# Patient Record
Sex: FEMALE | Race: BLACK OR AFRICAN AMERICAN | Employment: FULL TIME | URBAN - METROPOLITAN AREA
[De-identification: names, ages, dates, MRNs, and addresses within clinical notes are randomized per-mention and may not be internally consistent; named-entity substitution may affect disease eponyms.]

---

## 2017-10-18 ENCOUNTER — HOSPITAL ENCOUNTER (EMERGENCY)
Age: 47
Discharge: HOME | End: 2017-10-19
Attending: EMERGENCY MEDICINE
Payer: SELF-PAY

## 2017-10-18 VITALS
RESPIRATION RATE: 18 BRPM | SYSTOLIC BLOOD PRESSURE: 150 MMHG | OXYGEN SATURATION: 100 % | TEMPERATURE: 97.8 F | DIASTOLIC BLOOD PRESSURE: 93 MMHG | HEART RATE: 78 BPM

## 2017-10-18 DIAGNOSIS — R73.9 HYPERGLYCEMIA: Primary | ICD-10-CM

## 2017-10-18 LAB
ALBUMIN SERPL-MCNC: 3.5 G/DL (ref 3.4–5)
ALBUMIN/GLOB SERPL: 0.8 {RATIO} (ref 0.8–1.7)
ALP SERPL-CCNC: 139 U/L (ref 45–117)
ALT SERPL-CCNC: 23 U/L (ref 13–56)
ANION GAP SERPL CALC-SCNC: 7 MMOL/L (ref 3–18)
AST SERPL-CCNC: 34 U/L (ref 15–37)
BASOPHILS # BLD: 0 K/UL (ref 0–0.1)
BASOPHILS NFR BLD: 0 % (ref 0–2)
BILIRUB SERPL-MCNC: 0.6 MG/DL (ref 0.2–1)
BUN SERPL-MCNC: 13 MG/DL (ref 7–18)
BUN/CREAT SERPL: 13 (ref 12–20)
CALCIUM SERPL-MCNC: 8.9 MG/DL (ref 8.5–10.1)
CHLORIDE SERPL-SCNC: 100 MMOL/L (ref 100–108)
CO2 SERPL-SCNC: 26 MMOL/L (ref 21–32)
CREAT SERPL-MCNC: 0.98 MG/DL (ref 0.6–1.3)
DIFFERENTIAL METHOD BLD: NORMAL
EOSINOPHIL # BLD: 0.1 K/UL (ref 0–0.4)
EOSINOPHIL NFR BLD: 2 % (ref 0–5)
ERYTHROCYTE [DISTWIDTH] IN BLOOD BY AUTOMATED COUNT: 12.5 % (ref 11.6–14.5)
GLOBULIN SER CALC-MCNC: 4.5 G/DL (ref 2–4)
GLUCOSE BLD STRIP.AUTO-MCNC: 511 MG/DL (ref 70–110)
GLUCOSE SERPL-MCNC: 553 MG/DL (ref 74–99)
HCT VFR BLD AUTO: 39.8 % (ref 35–45)
HGB BLD-MCNC: 13 G/DL (ref 12–16)
LYMPHOCYTES # BLD: 2 K/UL (ref 0.9–3.6)
LYMPHOCYTES NFR BLD: 40 % (ref 21–52)
MAGNESIUM SERPL-MCNC: 2 MG/DL (ref 1.6–2.6)
MCH RBC QN AUTO: 25.7 PG (ref 24–34)
MCHC RBC AUTO-ENTMCNC: 32.7 G/DL (ref 31–37)
MCV RBC AUTO: 78.7 FL (ref 74–97)
MONOCYTES # BLD: 0.3 K/UL (ref 0.05–1.2)
MONOCYTES NFR BLD: 6 % (ref 3–10)
NEUTS SEG # BLD: 2.6 K/UL (ref 1.8–8)
NEUTS SEG NFR BLD: 52 % (ref 40–73)
PLATELET # BLD AUTO: 224 K/UL (ref 135–420)
PMV BLD AUTO: 11.5 FL (ref 9.2–11.8)
POTASSIUM SERPL-SCNC: 4.5 MMOL/L (ref 3.5–5.5)
PROT SERPL-MCNC: 8 G/DL (ref 6.4–8.2)
RBC # BLD AUTO: 5.06 M/UL (ref 4.2–5.3)
SODIUM SERPL-SCNC: 133 MMOL/L (ref 136–145)
WBC # BLD AUTO: 5 K/UL (ref 4.6–13.2)

## 2017-10-18 PROCEDURE — 85025 COMPLETE CBC W/AUTO DIFF WBC: CPT | Performed by: PHYSICIAN ASSISTANT

## 2017-10-18 PROCEDURE — 99284 EMERGENCY DEPT VISIT MOD MDM: CPT

## 2017-10-18 PROCEDURE — 74011250636 HC RX REV CODE- 250/636: Performed by: PHYSICIAN ASSISTANT

## 2017-10-18 PROCEDURE — 83735 ASSAY OF MAGNESIUM: CPT | Performed by: PHYSICIAN ASSISTANT

## 2017-10-18 PROCEDURE — 96360 HYDRATION IV INFUSION INIT: CPT

## 2017-10-18 PROCEDURE — 82962 GLUCOSE BLOOD TEST: CPT

## 2017-10-18 PROCEDURE — 93005 ELECTROCARDIOGRAM TRACING: CPT

## 2017-10-18 PROCEDURE — 74011636637 HC RX REV CODE- 636/637: Performed by: EMERGENCY MEDICINE

## 2017-10-18 PROCEDURE — 80053 COMPREHEN METABOLIC PANEL: CPT | Performed by: PHYSICIAN ASSISTANT

## 2017-10-18 RX ADMIN — INSULIN HUMAN 8 UNITS: 100 INJECTION, SOLUTION PARENTERAL at 22:55

## 2017-10-18 RX ADMIN — SODIUM CHLORIDE 1000 ML: 9 INJECTION, SOLUTION INTRAVENOUS at 22:55

## 2017-10-19 LAB
ATRIAL RATE: 66 BPM
CALCULATED P AXIS, ECG09: 27 DEGREES
CALCULATED R AXIS, ECG10: 29 DEGREES
CALCULATED T AXIS, ECG11: 42 DEGREES
DIAGNOSIS, 93000: NORMAL
GLUCOSE BLD STRIP.AUTO-MCNC: 315 MG/DL (ref 70–110)
P-R INTERVAL, ECG05: 150 MS
Q-T INTERVAL, ECG07: 442 MS
QRS DURATION, ECG06: 94 MS
QTC CALCULATION (BEZET), ECG08: 463 MS
VENTRICULAR RATE, ECG03: 66 BPM

## 2017-10-19 PROCEDURE — 82962 GLUCOSE BLOOD TEST: CPT

## 2017-10-19 NOTE — DISCHARGE INSTRUCTIONS
YOU SHOULD RECEIVING A CALL FROM THE     IF YOU HAVE NEW OR WORSENING SYMPTOMS, VOMITING, SEVERE ABDOMINAL PAIN, OR ANY OTHER WORRYING SIGNS THEN RETURN TO THE ER RIGHT AWAY. Learning About High Blood Sugar  What is high blood sugar? Your body turns the food you eat into glucose (sugar), which it uses for energy. But if your body isn't able to use the sugar right away, it can build up in your blood and lead to high blood sugar. When the amount of sugar in your blood stays too high for too much of the time, you may have diabetes. Diabetes is a disease that can cause serious health problems. The good news is that lifestyle changes may help you get your blood sugar back to normal and avoid or delay diabetes. What causes high blood sugar? Sugar (glucose) can build up in your blood if you:  · Are overweight. · Have a family history of diabetes. · Take certain medicines, such as steroids. What are the symptoms? Having high blood sugar may not cause any symptoms at all. Or it may make you feel very thirsty or very hungry. You may also urinate more often than usual, have blurry vision, or lose weight without trying. How is high blood sugar treated? You can take steps to lower your blood sugar level if you understand what makes it get higher. Your doctor may want you to learn how to test your blood sugar level at home. Then you can see how illness, stress, or different kinds of food or medicine raise or lower your blood sugar level. Other tests may be needed to see if you have diabetes. How can you prevent high blood sugar? · Watch your weight. If you're overweight, losing just a small amount of weight may help. Reducing fat around your waist is most important. · Limit the amount of calories, sweets, and unhealthy fat you eat. Ask your doctor if a dietitian can help you. A registered dietitian can help you create meal plans that fit your lifestyle.   · Get at least 30 minutes of exercise on most days of the week. Exercise helps control your blood sugar. It also helps you maintain a healthy weight. Walking is a good choice. You also may want to do other activities, such as running, swimming, cycling, or playing tennis or team sports. · If your doctor prescribed medicines, take them exactly as prescribed. Call your doctor if you think you are having a problem with your medicine. You will get more details on the specific medicines your doctor prescribes. Follow-up care is a key part of your treatment and safety. Be sure to make and go to all appointments, and call your doctor if you are having problems. It's also a good idea to know your test results and keep a list of the medicines you take. Where can you learn more? Go to http://marilee-fahad.info/. Enter O108 in the search box to learn more about \"Learning About High Blood Sugar. \"  Current as of: March 13, 2017  Content Version: 11.3  © 6918-6786 [employer]. Care instructions adapted under license by [employer] (which disclaims liability or warranty for this information). If you have questions about a medical condition or this instruction, always ask your healthcare professional. [place] [address] any warranty or liability for your use of this information. Learning About Meal Planning for Diabetes  Why plan your meals? Meal planning can be a key part of managing diabetes. Planning meals and snacks with the right balance of carbohydrate, protein, and fat can help you keep your blood sugar at the target level you set with your doctor. You don't have to eat special foods. You can eat what your family eats, including sweets once in a while. But you do have to pay attention to how often you eat and how much you eat of certain foods. You may want to work with a dietitian or a certified diabetes educator.  He or she can give you tips and meal ideas and can answer your questions about meal planning. This health professional can also help you reach a healthy weight if that is one of your goals. What plan is right for you? Your dietitian or diabetes educator may suggest that you start with the plate format or carbohydrate counting. The plate format  The plate format is a simple way to help you manage how you eat. You plan meals by learning how much space each food should take on a plate. Using the plate format helps you spread carbohydrate throughout the day. It can make it easier to keep your blood sugar level within your target range. It also helps you see if you're eating healthy portion sizes. To use the plate format, you put non-starchy vegetables on half your plate. Add meat or meat substitutes on one-quarter of the plate. Put a grain or starchy vegetable (such as brown rice or a potato) on the final quarter of the plate. You can add a small piece of fruit and some low-fat or fat-free milk or yogurt, depending on your carbohydrate goal for each meal.  Here are some tips for using the plate format:  · Make sure that you are not using an oversized plate. A 9-inch plate is best. Many restaurants use larger plates. · Get used to using the plate format at home. Then you can use it when you eat out. · Write down your questions about using the plate format. Talk to your doctor, a dietitian, or a diabetes educator about your concerns. Carbohydrate counting  With carbohydrate counting, you plan meals based on the amount of carbohydrate in each food. Carbohydrate raises blood sugar higher and more quickly than any other nutrient. It is found in desserts, breads and cereals, and fruit. It's also found in starchy vegetables such as potatoes and corn, grains such as rice and pasta, and milk and yogurt. Spreading carbohydrate throughout the day helps keep your blood sugar levels within your target range.   Your daily amount depends on several things, including your weight, how active you are, which diabetes medicines you take, and what your goals are for your blood sugar levels. A registered dietitian or diabetes educator can help you plan how much carbohydrate to include in each meal and snack. A guideline for your daily amount of carbohydrate is:  · 45 to 60 grams at each meal. That's about the same as 3 to 4 carbohydrate servings. · 15 to 20 grams at each snack. That's about the same as 1 carbohydrate serving. The Nutrition Facts label on packaged foods tells you how much carbohydrate is in a serving of the food. First, look at the serving size on the food label. Is that the amount you eat in a serving? All of the nutrition information on a food label is based on that serving size. So if you eat more or less than that, you'll need to adjust the other numbers. Total carbohydrate is the next thing you need to look for on the label. If you count carbohydrate servings, one serving of carbohydrate is 15 grams. For foods that don't come with labels, such as fresh fruits and vegetables, you'll need a guide that lists carbohydrate in these foods. Ask your doctor, dietitian, or diabetes educator about books or other nutrition guides you can use. If you take insulin, you need to know how many grams of carbohydrate are in a meal. This lets you know how much rapid-acting insulin to take before you eat. If you use an insulin pump, you get a constant rate of insulin during the day. So the pump must be programmed at meals to give you extra insulin to cover the rise in blood sugar after meals. When you know how much carbohydrate you will eat, you can take the right amount of insulin. Or, if you always use the same amount of insulin, you need to make sure that you eat the same amount of carbohydrate at meals. If you need more help to understand carbohydrate counting and food labels, ask your doctor, dietitian, or diabetes educator. How do you get started with meal planning?   Here are some tips to get started:  · Plan your meals a week at a time. Don't forget to include snacks too. · Use cookbooks or online recipes to plan several main meals. Plan some quick meals for busy nights. You also can double some recipes that freeze well. Then you can save half for other busy nights when you don't have time to cook. · Make sure you have the ingredients you need for your recipes. If you're running low on basic items, put these items on your shopping list too. · List foods that you use to make breakfasts, lunches, and snacks. List plenty of fruits and vegetables. · Post this list on the refrigerator. Add to it as you think of more things you need. · Take the list to the store to do your weekly shopping. Follow-up care is a key part of your treatment and safety. Be sure to make and go to all appointments, and call your doctor if you are having problems. It's also a good idea to know your test results and keep a list of the medicines you take. Where can you learn more? Go to http://marilee-fahad.info/. Natalee Mendoza in the search box to learn more about \"Learning About Meal Planning for Diabetes. \"  Current as of: March 13, 2017  Content Version: 11.3  © 0114-2459 [employer]. Care instructions adapted under license by [employer] (which disclaims liability or warranty for this information). If you have questions about a medical condition or this instruction, always ask your healthcare professional. [place] [address] any warranty or liability for your use of this information.

## 2017-10-19 NOTE — ED NOTES
Pt in ED on stretcher after running out of insulin. Pt is currently experiencing a headache, per patient \" my eyes feel like they are bulging\" Pt c/o pain and pressure in chest, SOB and lower back pain, denies pain when urinating but has an increase in frequency and urgency.

## 2017-10-19 NOTE — ED NOTES
I have reviewed discharge instructions with the patient. The patient verbalized understanding. Patient armband removed and shredded. Pt signed paper discharge instructions, removed all belongings and ambulated without distress or difficulty.

## 2017-10-19 NOTE — ED TRIAGE NOTES
Pt reports that she has had blurred vision, dizziness, and elevated blood sugar x 1 month. Sx worse today.

## 2017-10-19 NOTE — ED PROVIDER NOTES
HPI Comments: 10:21 PM      Dave Bran is a 50 y.o. Female with PMHx of Diabetes, HTN, Hyperthyroidism, Asthma and Endometriosis presents to the ED with a chief complaint of hyperglycemia for the past 1 month. Pt states she is diabetic and was not on her insulin (40 units Lantus daily) for the last 1 month as she ran out of insulin due to loss of insurance. Pt states she is dizzy, lightheaded and had a near syncopal episode today. Pt also reports she is very thirsty and has a blurry vision. Pt reports she has been taking her Metformin pills but not her insulin shots for the last month. Patient denies fever, chills, cough, SOB, chest pain, abdominal pain, N/V/D, dysuria or any other symptoms or complaints. The history is provided by the patient. No  was used. Past Medical History:   Diagnosis Date    Asthma     Bronchitis     Diabetes ([address]     Endocrine disease     hyperthyroidism    Endometriosis     Hypertension        Past Surgical History:   Procedure Laterality Date    HX GYN      partial hysterectomy    HX GYN      cervical laparoscopy; endometriosis    HX HYSTERECTOMY           Family History:   Problem Relation Age of Onset    Cancer Mother     Diabetes Mother     Dementia Mother     Diabetes Father        Social History     Social History    Marital status:      Spouse name: N/A    Number of children: N/A    Years of education: N/A     Occupational History    Not on file. Social History Main Topics    Smoking status: Never Smoker    Smokeless tobacco: Never Used    Alcohol use 0.0 oz/week     0 Standard drinks or equivalent per week      Comment: occasional    Drug use: No    Sexual activity: Not on file     Other Topics Concern    Not on file     Social History Narrative         ALLERGIES: Vicodin [hydrocodone-acetaminophen]    Review of Systems   Constitutional: Negative for chills, fatigue and fever.    HENT: Negative for congestion, ear pain, rhinorrhea and sore throat. Eyes: Negative for pain, redness and itching. Blurry vision   Respiratory: Negative for cough, chest tightness and shortness of breath. Cardiovascular: Negative for chest pain, palpitations and leg swelling. Gastrointestinal: Negative for abdominal pain, diarrhea, nausea and vomiting. Genitourinary: Negative for decreased urine volume, dysuria, flank pain, hematuria and pelvic pain. Musculoskeletal: Negative for arthralgias, back pain, joint swelling and myalgias. Skin: Negative for color change, pallor and rash. Neurological: Positive for dizziness and light-headedness. Negative for weakness and headaches. Near syncopal episode     Hematological: Negative for adenopathy. Does not bruise/bleed easily. Vitals:    10/18/17 2049   BP: (!) 150/93   Pulse: 78   Resp: 18   Temp: 97.8 °F (36.6 °C)   SpO2: 100%            Physical Exam   Constitutional: No distress. HENT:   Head: Normocephalic and atraumatic. Mouth/Throat: Oropharynx is clear and moist.   Eyes: Conjunctivae and EOM are normal. Pupils are equal, round, and reactive to light. Neck: Normal range of motion. Neck supple. Cardiovascular: Normal rate, regular rhythm and normal heart sounds. No murmur heard. Pulmonary/Chest: Effort normal and breath sounds normal. She has no wheezes. She has no rales. Abdominal: Soft. Bowel sounds are normal. She exhibits no distension. There is no tenderness. Musculoskeletal: Normal range of motion. She exhibits no edema or deformity. Lymphadenopathy:     She has no cervical adenopathy. Neurological: She is alert. She exhibits normal muscle tone. Coordination normal.   Skin: Skin is warm and dry. No rash noted. She is not diaphoretic. No erythema. Psychiatric: She has a normal mood and affect.  Her behavior is normal.        MDM  Number of Diagnoses or Management Options  Hyperglycemia:   Diagnosis management comments: Patient presenting with hyperglycemia x 1 month s/p running out of her insulin due to insurance issues. No signs of DKA, sepsis, or other acute pathology on work up. Glucose trending down after IVF and insulin, patient feeling better. Arranged  consultation to assist with meds. Counseled on dietary compliance and f/u. ED Course       Procedures           Vitals:  Patient Vitals for the past 12 hrs:   Temp Pulse Resp BP SpO2   10/18/17 2049 97.8 °F (36.6 °C) 78 18 (!) 150/93 100 %       Medications Ordered:  Medications   sodium chloride 0.9 % bolus infusion 1,000 mL (0 mL IntraVENous IV Completed 10/19/17 0001)   insulin regular (NOVOLIN R, HUMULIN R) injection 8 Units (8 Units SubCUTAneous Given 10/18/17 2255)       Lab Findings:  Recent Results (from the past 12 hour(s))   GLUCOSE, POC    Collection Time: 10/18/17  8:36 PM   Result Value Ref Range    Glucose (POC) 511 (HH) 70 - 110 mg/dL   CBC WITH AUTOMATED DIFF    Collection Time: 10/18/17 10:18 PM   Result Value Ref Range    WBC 5.0 4.6 - 13.2 K/uL    RBC 5.06 4.20 - 5.30 M/uL    HGB 13.0 12.0 - 16.0 g/dL    HCT 39.8 35.0 - 45.0 %    MCV 78.7 74.0 - 97.0 FL    MCH 25.7 24.0 - 34.0 PG    MCHC 32.7 31.0 - 37.0 g/dL    RDW 12.5 11.6 - 14.5 %    PLATELET 572 235 - 600 K/uL    MPV 11.5 9.2 - 11.8 FL    NEUTROPHILS 52 40 - 73 %    LYMPHOCYTES 40 21 - 52 %    MONOCYTES 6 3 - 10 %    EOSINOPHILS 2 0 - 5 %    BASOPHILS 0 0 - 2 %    ABS. NEUTROPHILS 2.6 1.8 - 8.0 K/UL    ABS. LYMPHOCYTES 2.0 0.9 - 3.6 K/UL    ABS. MONOCYTES 0.3 0.05 - 1.2 K/UL    ABS. EOSINOPHILS 0.1 0.0 - 0.4 K/UL    ABS.  BASOPHILS 0.0 0.0 - 0.1 K/UL    DF AUTOMATED     MAGNESIUM    Collection Time: 10/18/17 10:18 PM   Result Value Ref Range    Magnesium 2.0 1.6 - 2.6 mg/dL   METABOLIC PANEL, COMPREHENSIVE    Collection Time: 10/18/17 10:18 PM   Result Value Ref Range    Sodium 133 (L) 136 - 145 mmol/L    Potassium 4.5 3.5 - 5.5 mmol/L    Chloride 100 100 - 108 mmol/L    CO2 26 21 - 32 mmol/L    Anion gap 7 3.0 - 18 mmol/L    Glucose 553 (HH) 74 - 99 mg/dL    BUN 13 7.0 - 18 MG/DL    Creatinine 0.98 0.6 - 1.3 MG/DL    BUN/Creatinine ratio 13 12 - 20      GFR est AA >60 >60 ml/min/1.73m2    GFR est non-AA >60 >60 ml/min/1.73m2    Calcium 8.9 8.5 - 10.1 MG/DL    Bilirubin, total 0.6 0.2 - 1.0 MG/DL    ALT (SGPT) 23 13 - 56 U/L    AST (SGOT) 34 15 - 37 U/L    Alk. phosphatase 139 (H) 45 - 117 U/L    Protein, total 8.0 6.4 - 8.2 g/dL    Albumin 3.5 3.4 - 5.0 g/dL    Globulin 4.5 (H) 2.0 - 4.0 g/dL    A-G Ratio 0.8 0.8 - 1.7     EKG, 12 LEAD, INITIAL    Collection Time: 10/18/17 11:24 PM   Result Value Ref Range    Ventricular Rate 66 BPM    Atrial Rate 66 BPM    P-R Interval 150 ms    QRS Duration 94 ms    Q-T Interval 442 ms    QTC Calculation (Bezet) 463 ms    Calculated P Axis 27 degrees    Calculated R Axis 29 degrees    Calculated T Axis 42 degrees    Diagnosis       Normal sinus rhythm  Normal ECG  When compared with ECG of 21-OCT-2015 19:08,  No significant change was found     GLUCOSE, POC    Collection Time: 10/19/17  1:39 AM   Result Value Ref Range    Glucose (POC) 315 (H) 70 - 110 mg/dL       EKG Interpretation by ED physician:  Rhythm: NSR  Rate: 66 bpm  Interpretation: No acute ST changes. EKG interpret by No att. providers found 11:24 PM      Diagnosis:   1. Hyperglycemia        Disposition: Discharge     Follow-up Information     Follow up With Details Comments Contact Info    [facility] EMERGENCY DEPT  If symptoms worsen, As needed [address] Schedule an appointment as soon as possible for a visit in 1 week  [address]  [phone]           Discharge Medication List as of 10/19/2017  2:13 AM      CONTINUE these medications which have NOT CHANGED    Details   lisinopril (PRINIVIL, ZESTRIL) 10 mg tablet Take 1 Tab by mouth daily. , Normal, Disp-30 Tab, R-3      metFORMIN (GLUCOPHAGE) 1,000 mg tablet Take 1 Tab by mouth two (2) times daily (with meals). , Normal, Disp-60 Tab, R-3      insulin detemir (LEVEMIR) 100 unit/mL injection 0.4 mL by SubCUTAneous route two (2) times a day., Normal, Disp-2 Vial, R-11      levothyroxine (SYNTHROID) 125 mcg tablet Take 1 Tab by mouth Daily (before breakfast). , No Print, Disp-30 Tab, R-3      hydrocortisone (ANUSOL-HC) 2.5 % rectal cream Insert  into rectum four (4) times daily. , Normal, Disp-30 g, R-0      nortriptyline (PAMELOR) 25 mg capsule Take 1 Cap by mouth nightly. May increase to 2 pills in 3 days, Normal, Disp-60 Cap, R-5      ACCU-CHEK NURIA PLUS TEST STRP strip Historical Med, ISAIAH      Insulin Needles, Disposable, (INSULIN PEN NEEDLE) 31 gauge X 1/4 \" ndle 1 needle 3 times a day, Normal, Disp-30 Each, R-3      SUMAtriptan (IMITREX) 50 mg tablet Take 1 Tab by mouth once as needed for Migraine for up to 1 dose. May repeat 1 tab by mouth in 2 hrs if no satisfactory response with initial tablet, Print, Disp-10 Tab, R-0      ondansetron (ZOFRAN ODT) 8 mg disintegrating tablet Take 1 Tab by mouth every eight (8) hours as needed for Nausea., Normal, Disp-20 Tab, R-0      fluticasone-salmeterol (ADVAIR) 250-50 mcg/dose diskus inhaler Take 1 Puff by inhalation daily. , Normal, Disp-1 Inhaler, R-3      cetirizine (ZYRTEC) 10 mg tablet Take 1 Tab by mouth daily. , Normal, Disp-90 Tab, R-3      albuterol (PROVENTIL HFA, VENTOLIN HFA, PROAIR HFA) 90 mcg/actuation inhaler Take 2 puffs by inhalation every four (4) hours as needed for Wheezing., Print, Disp-1 Inhaler, R-0             Scribe Attestation      Carolyn Burnett acting as a scribe for and in the presence of Christen Rosario MD      October 18, 2017 at 10:27 PM       Provider Attestation:      I personally performed the services described in the documentation, reviewed the documentation, as recorded by the scribe in my presence, and it accurately and completely records my words and actions.  October 18, 2017 at 10:27 PM - Anne Barr MD

## 2017-10-31 ENCOUNTER — HOSPITAL ENCOUNTER (EMERGENCY)
Age: 47
Discharge: HOME | End: 2017-10-31
Attending: EMERGENCY MEDICINE
Payer: MEDICAID

## 2017-10-31 VITALS
DIASTOLIC BLOOD PRESSURE: 74 MMHG | BODY MASS INDEX: 21.69 KG/M2 | SYSTOLIC BLOOD PRESSURE: 117 MMHG | TEMPERATURE: 98.2 F | WEIGHT: 135 LBS | OXYGEN SATURATION: 97 % | HEIGHT: 66 IN | HEART RATE: 87 BPM | RESPIRATION RATE: 17 BRPM

## 2017-10-31 LAB
ALBUMIN SERPL-MCNC: 4 G/DL (ref 3.4–5)
ALBUMIN/GLOB SERPL: 0.9 {RATIO} (ref 0.8–1.7)
ALP SERPL-CCNC: 130 U/L (ref 45–117)
ALT SERPL-CCNC: 21 U/L (ref 13–56)
ANION GAP SERPL CALC-SCNC: 5 MMOL/L (ref 3–18)
APPEARANCE UR: CLEAR
AST SERPL-CCNC: 26 U/L (ref 15–37)
BASOPHILS # BLD: 0 K/UL (ref 0–0.06)
BASOPHILS NFR BLD: 0 % (ref 0–2)
BILIRUB SERPL-MCNC: 0.7 MG/DL (ref 0.2–1)
BILIRUB UR QL: NEGATIVE
BUN SERPL-MCNC: 14 MG/DL (ref 7–18)
BUN/CREAT SERPL: 15 (ref 12–20)
CALCIUM SERPL-MCNC: 9.8 MG/DL (ref 8.5–10.1)
CHLORIDE SERPL-SCNC: 98 MMOL/L (ref 100–108)
CK MB CFR SERPL CALC: 1.1 % (ref 0–4)
CK MB SERPL-MCNC: 1 NG/ML (ref 5–25)
CK SERPL-CCNC: 89 U/L (ref 26–192)
CO2 SERPL-SCNC: 29 MMOL/L (ref 21–32)
COLOR UR: YELLOW
CREAT SERPL-MCNC: 0.91 MG/DL (ref 0.6–1.3)
DIFFERENTIAL METHOD BLD: ABNORMAL
EOSINOPHIL # BLD: 0.1 K/UL (ref 0–0.4)
EOSINOPHIL NFR BLD: 2 % (ref 0–5)
ERYTHROCYTE [DISTWIDTH] IN BLOOD BY AUTOMATED COUNT: 12.7 % (ref 11.6–14.5)
GLOBULIN SER CALC-MCNC: 4.5 G/DL (ref 2–4)
GLUCOSE BLD STRIP.AUTO-MCNC: 315 MG/DL (ref 70–110)
GLUCOSE BLD STRIP.AUTO-MCNC: 469 MG/DL (ref 70–110)
GLUCOSE SERPL-MCNC: 490 MG/DL (ref 74–99)
GLUCOSE UR STRIP.AUTO-MCNC: >1000 MG/DL
HCT VFR BLD AUTO: 43.1 % (ref 35–45)
HGB BLD-MCNC: 14.3 G/DL (ref 12–16)
HGB UR QL STRIP: NEGATIVE
KETONES UR QL STRIP.AUTO: 15 MG/DL
LEUKOCYTE ESTERASE UR QL STRIP.AUTO: NEGATIVE
LIPASE SERPL-CCNC: 72 U/L (ref 73–393)
LYMPHOCYTES # BLD: 2.1 K/UL (ref 0.9–3.6)
LYMPHOCYTES NFR BLD: 37 % (ref 21–52)
MCH RBC QN AUTO: 25.1 PG (ref 24–34)
MCHC RBC AUTO-ENTMCNC: 33.2 G/DL (ref 31–37)
MCV RBC AUTO: 75.7 FL (ref 74–97)
MONOCYTES # BLD: 0.4 K/UL (ref 0.05–1.2)
MONOCYTES NFR BLD: 7 % (ref 3–10)
NEUTS SEG # BLD: 3.1 K/UL (ref 1.8–8)
NEUTS SEG NFR BLD: 54 % (ref 40–73)
NITRITE UR QL STRIP.AUTO: NEGATIVE
PH UR STRIP: 5.5 [PH] (ref 5–8)
PLATELET # BLD AUTO: 197 K/UL (ref 135–420)
PMV BLD AUTO: 11.5 FL (ref 9.2–11.8)
POTASSIUM SERPL-SCNC: 4.2 MMOL/L (ref 3.5–5.5)
PROT SERPL-MCNC: 8.5 G/DL (ref 6.4–8.2)
PROT UR STRIP-MCNC: NEGATIVE MG/DL
RBC # BLD AUTO: 5.69 M/UL (ref 4.2–5.3)
SODIUM SERPL-SCNC: 132 MMOL/L (ref 136–145)
SP GR UR REFRACTOMETRY: >1.03 (ref 1–1.03)
TROPONIN I SERPL-MCNC: <0.02 NG/ML (ref 0–0.06)
UROBILINOGEN UR QL STRIP.AUTO: 0.2 EU/DL (ref 0.2–1)
WBC # BLD AUTO: 5.7 K/UL (ref 4.6–13.2)

## 2017-10-31 PROCEDURE — 96361 HYDRATE IV INFUSION ADD-ON: CPT

## 2017-10-31 PROCEDURE — 93005 ELECTROCARDIOGRAM TRACING: CPT

## 2017-10-31 PROCEDURE — 85025 COMPLETE CBC W/AUTO DIFF WBC: CPT | Performed by: EMERGENCY MEDICINE

## 2017-10-31 PROCEDURE — 99285 EMERGENCY DEPT VISIT HI MDM: CPT

## 2017-10-31 PROCEDURE — 74011250636 HC RX REV CODE- 250/636: Performed by: EMERGENCY MEDICINE

## 2017-10-31 PROCEDURE — 82962 GLUCOSE BLOOD TEST: CPT

## 2017-10-31 PROCEDURE — 80053 COMPREHEN METABOLIC PANEL: CPT | Performed by: EMERGENCY MEDICINE

## 2017-10-31 PROCEDURE — 82550 ASSAY OF CK (CPK): CPT | Performed by: EMERGENCY MEDICINE

## 2017-10-31 PROCEDURE — 74011636637 HC RX REV CODE- 636/637: Performed by: EMERGENCY MEDICINE

## 2017-10-31 PROCEDURE — 96374 THER/PROPH/DIAG INJ IV PUSH: CPT

## 2017-10-31 PROCEDURE — 81003 URINALYSIS AUTO W/O SCOPE: CPT | Performed by: NURSE PRACTITIONER

## 2017-10-31 PROCEDURE — 83690 ASSAY OF LIPASE: CPT | Performed by: EMERGENCY MEDICINE

## 2017-10-31 RX ORDER — SODIUM CHLORIDE 9 MG/ML
1000 INJECTION, SOLUTION INTRAVENOUS ONCE
Status: COMPLETED | OUTPATIENT
Start: 2017-10-31 | End: 2017-10-31

## 2017-10-31 RX ADMIN — INSULIN HUMAN 12 UNITS: 100 INJECTION, SOLUTION PARENTERAL at 18:34

## 2017-10-31 RX ADMIN — SODIUM CHLORIDE 1000 ML: 900 INJECTION, SOLUTION INTRAVENOUS at 18:34

## 2017-10-31 NOTE — ED NOTES
Warm blankets given per patient request.  Patient resting in bed. IVF infusing without difficulty. No s/sx of distress noted.

## 2017-10-31 NOTE — ED PROVIDER NOTES
HPI Comments: 6:01 PM: Emmanuelle Jane is a 50y.o. year old female with hx of DM and HTN presenting to the ED with c/o increased blood sugar levels onset 2 weeks. Pt states being out of her Levemir for the past 2 months due to issues with insurance with sx rapidly increasing the last 2 weeks. Pt reports having increased thirst, frequent urination, and blurry vision (vision significantly worsening the past 2 days). Pt was taking 40 units of Levemir 2x/day. The history is provided by the patient. Past Medical History:   Diagnosis Date    Asthma     Bronchitis     Diabetes ([address]     Endocrine disease     hyperthyroidism    Endometriosis     Hypertension        Past Surgical History:   Procedure Laterality Date    HX GYN      partial hysterectomy    HX GYN      cervical laparoscopy; endometriosis    HX HYSTERECTOMY           Family History:   Problem Relation Age of Onset    Cancer Mother     Diabetes Mother     Dementia Mother     Diabetes Father        Social History     Social History    Marital status:      Spouse name: N/A    Number of children: N/A    Years of education: N/A     Occupational History    Not on file. Social History Main Topics    Smoking status: Never Smoker    Smokeless tobacco: Never Used    Alcohol use 0.0 oz/week     0 Standard drinks or equivalent per week      Comment: occasional    Drug use: No    Sexual activity: Not on file     Other Topics Concern    Not on file     Social History Narrative         ALLERGIES: Vicodin [hydrocodone-acetaminophen]    Review of Systems   Constitutional: Positive for fatigue. Negative for chills and fever. HENT: Negative for sore throat. Respiratory: Negative for shortness of breath. Cardiovascular: Negative for chest pain. Gastrointestinal: Positive for diarrhea. Negative for vomiting. Endocrine: Positive for polydipsia and polyuria. Skin: Negative for rash. Neurological: Negative for headaches. Vitals:    10/31/17 1808 10/31/17 1815 10/31/17 1816 10/31/17 1830   BP:  124/77  134/76   Pulse: 87 83 82 87   Resp: 15 20 19 20   Temp:       SpO2: 98% 100% 100% 98%   Weight:       Height:                Physical Exam   Constitutional: She is oriented to person, place, and time. She appears well-developed and well-nourished. No distress. HENT:   Head: Normocephalic and atraumatic. Eyes: No scleral icterus. Cardiovascular: Normal rate. Pulmonary/Chest: Effort normal and breath sounds normal.   Abdominal: Soft. There is no tenderness. Neurological: She is alert and oriented to person, place, and time. Skin: Skin is warm and dry. Psychiatric: She has a normal mood and affect. Nursing note and vitals reviewed. MDM  Number of Diagnoses or Management Options  Uncontrolled type 1 diabetes mellitus without complication [facility]):   Diagnosis management comments: IMP: Diabetes, type 1 uncontrolled. IVF and insulin administered. Not in DKA, normal bicarb and not vomiting. Pt reports cannot afford Levimir ($800/vial), but w/ coupon Novulin N only $24. Will switch to cheaper insulin pending pt's insurance approval. Repeat accucheck post .     ED Course       Procedures      Vitals:  Patient Vitals for the past 12 hrs:   Temp Pulse Resp BP SpO2   10/31/17 1830 - 87 20 134/76 98 %   10/31/17 1816 - 82 19 - 100 %   10/31/17 1815 - 83 20 124/77 100 %   10/31/17 1808 - 87 15 - 98 %   10/31/17 1806 - - - 110/73 98 %   10/31/17 1738 98.2 °F (36.8 °C) 80 16 (!) 140/93 97 %       Medications Ordered:  Medications   0.9% sodium chloride infusion 1,000 mL (1,000 mL IntraVENous New Bag 10/31/17 1834)   insulin regular (NOVOLIN R, HUMULIN R) injection 12 Units (12 Units IntraVENous Given 10/31/17 1834)       Lab Findings:  Recent Results (from the past 12 hour(s))   GLUCOSE, POC    Collection Time: 10/31/17  5:48 PM   Result Value Ref Range    Glucose (POC) 469 (HH) 70 - 110 mg/dL   GLUCOSE, POC Collection Time: 10/31/17  5:49 PM   Result Value Ref Range    Glucose (POC) 440 (HH) 70 - 110 mg/dL   CBC WITH AUTOMATED DIFF    Collection Time: 10/31/17  5:59 PM   Result Value Ref Range    WBC 5.7 4.6 - 13.2 K/uL    RBC 5.69 (H) 4.20 - 5.30 M/uL    HGB 14.3 12.0 - 16.0 g/dL    HCT 43.1 35.0 - 45.0 %    MCV 75.7 74.0 - 97.0 FL    MCH 25.1 24.0 - 34.0 PG    MCHC 33.2 31.0 - 37.0 g/dL    RDW 12.7 11.6 - 14.5 %    PLATELET 581 891 - 939 K/uL    MPV 11.5 9.2 - 11.8 FL    NEUTROPHILS 54 40 - 73 %    LYMPHOCYTES 37 21 - 52 %    MONOCYTES 7 3 - 10 %    EOSINOPHILS 2 0 - 5 %    BASOPHILS 0 0 - 2 %    ABS. NEUTROPHILS 3.1 1.8 - 8.0 K/UL    ABS. LYMPHOCYTES 2.1 0.9 - 3.6 K/UL    ABS. MONOCYTES 0.4 0.05 - 1.2 K/UL    ABS. EOSINOPHILS 0.1 0.0 - 0.4 K/UL    ABS. BASOPHILS 0.0 0.0 - 0.06 K/UL    DF AUTOMATED     METABOLIC PANEL, COMPREHENSIVE    Collection Time: 10/31/17  5:59 PM   Result Value Ref Range    Sodium 132 (L) 136 - 145 mmol/L    Potassium 4.2 3.5 - 5.5 mmol/L    Chloride 98 (L) 100 - 108 mmol/L    CO2 29 21 - 32 mmol/L    Anion gap 5 3.0 - 18 mmol/L    Glucose 490 (HH) 74 - 99 mg/dL    BUN 14 7.0 - 18 MG/DL    Creatinine 0.91 0.6 - 1.3 MG/DL    BUN/Creatinine ratio 15 12 - 20      GFR est AA >60 >60 ml/min/1.73m2    GFR est non-AA >60 >60 ml/min/1.73m2    Calcium 9.8 8.5 - 10.1 MG/DL    Bilirubin, total 0.7 0.2 - 1.0 MG/DL    ALT (SGPT) 21 13 - 56 U/L    AST (SGOT) 26 15 - 37 U/L    Alk.  phosphatase 130 (H) 45 - 117 U/L    Protein, total 8.5 (H) 6.4 - 8.2 g/dL    Albumin 4.0 3.4 - 5.0 g/dL    Globulin 4.5 (H) 2.0 - 4.0 g/dL    A-G Ratio 0.9 0.8 - 1.7     URINALYSIS W/ RFLX MICROSCOPIC    Collection Time: 10/31/17  6:00 PM   Result Value Ref Range    Color YELLOW      Appearance CLEAR      Specific gravity >1.030 (H) 1.005 - 1.030    pH (UA) 5.5 5.0 - 8.0      Protein NEGATIVE  NEG mg/dL    Glucose >1000 (A) NEG mg/dL    Ketone 15 (A) NEG mg/dL    Bilirubin NEGATIVE  NEG      Blood NEGATIVE  NEG      Urobilinogen 0.2 0.2 - 1.0 EU/dL    Nitrites NEGATIVE  NEG      Leukocyte Esterase NEGATIVE  NEG     EKG, 12 LEAD, INITIAL    Collection Time: 10/31/17  6:11 PM   Result Value Ref Range    Ventricular Rate 84 BPM    Atrial Rate 84 BPM    P-R Interval 130 ms    QRS Duration 88 ms    Q-T Interval 394 ms    QTC Calculation (Bezet) 465 ms    Calculated P Axis 57 degrees    Calculated R Axis 20 degrees    Calculated T Axis 55 degrees    Diagnosis       Normal sinus rhythm  Normal ECG  When compared with ECG of 18-OCT-2017 23:24,  No significant change was found         EKG Interpretation by ED physician:  NSR, no ischemic change    X-ray, CT or radiology findings or impressions:  No orders to display     Diagnosis:   1. Uncontrolled type 1 diabetes mellitus without complication (HCC)      1) Resume Insulin N 30 units twice daily (available $24 at [facility] with coupon)  2) Check your sugars frequently and take results to your PCP for dosage adjustment  3) Follow American Diabetes Association dietary guidelines  4) See your PCP recheck next week  5) Return for acutely worsening symptoms    Disposition: home    Follow-up Information     Follow up With Details Comments [address] Parikh NP   [facility]  [phone]             Patient's Medications   Start Taking    No medications on file   Continue Taking    ACCU-CHEK NURIA PLUS TEST STRP STRIP        ALBUTEROL (PROVENTIL HFA, VENTOLIN HFA, PROAIR HFA) 90 MCG/ACTUATION INHALER    Take 2 puffs by inhalation every four (4) hours as needed for Wheezing. CETIRIZINE (ZYRTEC) 10 MG TABLET    Take 1 Tab by mouth daily. FLUTICASONE-SALMETEROL (ADVAIR) 250-50 MCG/DOSE DISKUS INHALER    Take 1 Puff by inhalation daily. HYDROCORTISONE (ANUSOL-HC) 2.5 % RECTAL CREAM    Insert  into rectum four (4) times daily. INSULIN DETEMIR (LEVEMIR) 100 UNIT/ML INJECTION    0.4 mL by SubCUTAneous route two (2) times a day.     INSULIN NEEDLES, DISPOSABLE, (INSULIN PEN NEEDLE) 31 GAUGE X 1/4 \" NDLE    1 needle 3 times a day    LEVOTHYROXINE (SYNTHROID) 125 MCG TABLET    Take 1 Tab by mouth Daily (before breakfast). LISINOPRIL (PRINIVIL, ZESTRIL) 10 MG TABLET    Take 1 Tab by mouth daily. METFORMIN (GLUCOPHAGE) 1,000 MG TABLET    Take 1 Tab by mouth two (2) times daily (with meals). NORTRIPTYLINE (PAMELOR) 25 MG CAPSULE    Take 1 Cap by mouth nightly. May increase to 2 pills in 3 days    ONDANSETRON (ZOFRAN ODT) 8 MG DISINTEGRATING TABLET    Take 1 Tab by mouth every eight (8) hours as needed for Nausea. SUMATRIPTAN (IMITREX) 50 MG TABLET    Take 1 Tab by mouth once as needed for Migraine for up to 1 dose. May repeat 1 tab by mouth in 2 hrs if no satisfactory response with initial tablet   These Medications have changed    No medications on file   Stop Taking    No medications on file       Scribe Amy JIMENEZ. 38. acting as a scribe for and in the presence of Baylee Medina MD      October 31, 2017 at [facility] PM       Provider Attestation:      I personally performed the services described in the documentation, reviewed the documentation, as recorded by the scribe in my presence, and it accurately and completely records my words and actions.  October 31, 2017 at 6:11 PM - Baylee Medina MD

## 2017-10-31 NOTE — ED NOTES
Verbal shift change report given to Jg Fried, RN (oncoming nurse) by Ej Danielson RN (offgoing nurse).  Report included the following information SBAR, ED Summary, Procedure Summary, MAR, Recent Results and Cardiac Rhythm SR.

## 2017-10-31 NOTE — ED TRIAGE NOTES
Patient reports to ED with complaints of fatigue and elevated blood sugar. Patient states she has not had her insulin in 2 months due to insurance lapse. Patient states she took her glucose yesterday and it was 610.

## 2017-10-31 NOTE — ED NOTES
Per Lizzy Michelle in Lab, labs being re-run d/t equipment malfunction and results will be available in 20 minutes.

## 2017-10-31 NOTE — DISCHARGE INSTRUCTIONS

## 2017-10-31 NOTE — Clinical Note
1) Resume Insulin N 30 units twice daily (available $24 at [facility] with coupon) 2) Check your sugars frequently and take results to your PCP for dosage adjustment 3) Follow American Diabetes Association dietary guidelines 4) See your PCP recheck ne xt week 5) Return for acutely worsening symptoms

## 2017-11-01 LAB — GLUCOSE BLD STRIP.AUTO-MCNC: 440 MG/DL (ref 70–110)

## 2017-11-02 LAB
ATRIAL RATE: 84 BPM
CALCULATED P AXIS, ECG09: 57 DEGREES
CALCULATED R AXIS, ECG10: 20 DEGREES
CALCULATED T AXIS, ECG11: 55 DEGREES
DIAGNOSIS, 93000: NORMAL
P-R INTERVAL, ECG05: 130 MS
Q-T INTERVAL, ECG07: 394 MS
QRS DURATION, ECG06: 88 MS
QTC CALCULATION (BEZET), ECG08: 465 MS
VENTRICULAR RATE, ECG03: 84 BPM

## 2018-01-15 ENCOUNTER — OFFICE VISIT (OUTPATIENT)
Dept: FAMILY MEDICINE CLINIC | Facility: CLINIC | Age: 48
End: 2018-01-15

## 2018-01-15 VITALS
SYSTOLIC BLOOD PRESSURE: 137 MMHG | HEART RATE: 82 BPM | HEIGHT: 66 IN | BODY MASS INDEX: 26.03 KG/M2 | TEMPERATURE: 98.2 F | RESPIRATION RATE: 16 BRPM | WEIGHT: 162 LBS | OXYGEN SATURATION: 100 % | DIASTOLIC BLOOD PRESSURE: 92 MMHG

## 2018-01-15 DIAGNOSIS — E78.5 HYPERLIPIDEMIA ASSOCIATED WITH TYPE 2 DIABETES MELLITUS (HCC): ICD-10-CM

## 2018-01-15 DIAGNOSIS — K64.9 HEMORRHOIDS, UNSPECIFIED HEMORRHOID TYPE: ICD-10-CM

## 2018-01-15 DIAGNOSIS — G43.019 INTRACTABLE MIGRAINE WITHOUT AURA AND WITHOUT STATUS MIGRAINOSUS: ICD-10-CM

## 2018-01-15 DIAGNOSIS — J30.2 CHRONIC SEASONAL ALLERGIC RHINITIS, UNSPECIFIED TRIGGER: ICD-10-CM

## 2018-01-15 DIAGNOSIS — E11.9 TYPE 2 DIABETES MELLITUS WITHOUT COMPLICATION, WITH LONG-TERM CURRENT USE OF INSULIN (HCC): ICD-10-CM

## 2018-01-15 DIAGNOSIS — Z79.4 TYPE 2 DIABETES MELLITUS WITHOUT COMPLICATION, WITH LONG-TERM CURRENT USE OF INSULIN (HCC): ICD-10-CM

## 2018-01-15 DIAGNOSIS — E03.9 HYPOTHYROIDISM, UNSPECIFIED TYPE: ICD-10-CM

## 2018-01-15 DIAGNOSIS — I10 ESSENTIAL HYPERTENSION: Primary | ICD-10-CM

## 2018-01-15 DIAGNOSIS — E11.69 HYPERLIPIDEMIA ASSOCIATED WITH TYPE 2 DIABETES MELLITUS (HCC): ICD-10-CM

## 2018-01-15 DIAGNOSIS — J45.20 MILD INTERMITTENT ASTHMA WITHOUT COMPLICATION: ICD-10-CM

## 2018-01-15 RX ORDER — LEVOTHYROXINE SODIUM 100 UG/1
100 TABLET ORAL
Qty: 90 TAB | Refills: 0 | Status: SHIPPED | OUTPATIENT
Start: 2018-01-15 | End: 2018-12-17 | Stop reason: SDUPTHER

## 2018-01-15 RX ORDER — SUMATRIPTAN 50 MG/1
TABLET, FILM COATED ORAL
Qty: 10 TAB | Refills: 11 | Status: SHIPPED | OUTPATIENT
Start: 2018-01-15 | End: 2021-05-21 | Stop reason: SINTOL

## 2018-01-15 RX ORDER — METFORMIN HYDROCHLORIDE 1000 MG/1
1000 TABLET ORAL 2 TIMES DAILY WITH MEALS
Qty: 60 TAB | Refills: 11 | Status: SHIPPED | OUTPATIENT
Start: 2018-01-15 | End: 2019-11-21 | Stop reason: SDUPTHER

## 2018-01-15 RX ORDER — NORTRIPTYLINE HYDROCHLORIDE 25 MG/1
50 CAPSULE ORAL
Qty: 60 CAP | Refills: 11 | Status: SHIPPED | OUTPATIENT
Start: 2018-01-15 | End: 2018-10-07

## 2018-01-15 RX ORDER — LISINOPRIL 10 MG/1
10 TABLET ORAL DAILY
Qty: 30 TAB | Refills: 11 | Status: SHIPPED | OUTPATIENT
Start: 2018-01-15 | End: 2019-03-28 | Stop reason: SDUPTHER

## 2018-01-15 NOTE — PROGRESS NOTES
Pt is here for F/U on HTN, Diabetes, Asthma. Pt states she has been without any medications since the end of Oct. 2017 due to an insurance mixup. Pt c/o yeast infection that she has been treating with OTC meds w/o total relief. 1. Have you been to the ER, urgent care clinic since your last visit? Hospitalized since your last visit? Yes Yes, [facility] ED 10/18/17 Elevated , 10/31/17 hospitals ED Uncontrolled Diabetes,     2. Have you seen or consulted any other health care providers outside of the [address] [place] [address] since your last visit? Include any pap smears or colon screening.  No

## 2018-01-15 NOTE — MR AVS SNAPSHOT
[address] [place] [address] 1 [facility] [address] 
[phone] Patient: Nic Headley MRN: [id number] :1970 Visit Information Date & Time Provider Department Dept. Phone Encounter #  
 1/15/2018  4:00 PM Juventino Alvarez MD [facility] [phone] [id number] Follow-up Instructions Return in about 4 weeks (around 2018). Upcoming Health Maintenance Date Due  
 EYE EXAM RETINAL OR DILATED Q1 1978 BREAST CANCER SCRN MAMMOGRAM 10/29/2016 LIPID PANEL Q1 2017 HEMOGLOBIN A1C Q6M 2017 FOOT EXAM Q1 2017 MICROALBUMIN Q1 2017 PAP AKA CERVICAL CYTOLOGY 3/5/2018 DTaP/Tdap/Td series (2 - Td) 2026 Allergies as of 1/15/2018  Review Complete On: 1/15/2018 By: Juventino Alvarez MD  
  
 Severity Noted Reaction Type Reactions Vicodin [Hydrocodone-acetaminophen]  2012    Shortness of Breath Current Immunizations  Never Reviewed Name Date Influenza Vaccine Jerrica Weinstein) 10/15/2015 10:31 AM  
  
 Not reviewed this visit You Were Diagnosed With   
  
 Codes Comments Essential hypertension    -  Primary ICD-10-CM: I10 
ICD-9-CM: 401.9 Insulin dependent diabetes mellitus (HCC)     ICD-10-CM: E11.9, Z79.4 ICD-9-CM: 250.00, V58.67 Hypothyroidism, unspecified type     ICD-10-CM: E03.9 ICD-9-CM: 737. 9 Type 2 diabetes mellitus without complication, with long-term current use of insulin (HCC)     ICD-10-CM: E11.9, Z79.4 ICD-9-CM: 250.00, V58.67 Hyperlipidemia associated with type 2 diabetes mellitus ([facility] [address]     ICD-10-CM: E11.69, E78.5 ICD-9-CM: 250.80, 272.4 Mild intermittent asthma without complication     NBF-48-ZB: J45.20 ICD-9-CM: 493.90 Chronic seasonal allergic rhinitis, unspecified trigger     ICD-10-CM: J30.2 ICD-9-CM: 477.8 Hemorrhoids, unspecified hemorrhoid type     ICD-10-CM: K64.9 ICD-9-CM: 455.6 Intractable migraine without aura and without status migrainosus     ICD-10-CM: G43.019 
ICD-9-CM: 346.11 Vitals BP Pulse Temp Resp Height(growth percentile) Weight(growth percentile) (!) 137/92 82 98.2 °F (36.8 °C) (Oral) 16 5' 6\" (1.676 m) 162 lb (73.5 kg) SpO2 BMI OB Status Smoking Status 100% 26.15 kg/m2 Hysterectomy Never Smoker Vitals History BMI and BSA Data Body Mass Index Body Surface Area  
 26.15 kg/m 2 1.85 m 2 Preferred Pharmacy Pharmacy Name Phone [facility] [address] [place] [address] [phone] Your Updated Medication List  
  
   
This list is accurate as of: 1/15/18  4:52 PM.  Always use your most recent med list.  
  
  
  
  
 ACCU-CHEK NURIA PLUS TEST STRP strip Generic drug:  glucose blood VI test strips  
  
 albuterol 90 mcg/actuation inhaler Commonly known as:  PROVENTIL HFA, VENTOLIN HFA, PROAIR HFA Take 2 puffs by inhalation every four (4) hours as needed for Wheezing. cetirizine 10 mg tablet Commonly known as:  ZyrTEC Take 1 Tab by mouth daily. fluticasone-salmeterol 250-50 mcg/dose diskus inhaler Commonly known as:  ADVAIR Take 1 Puff by inhalation daily. insulin detemir 100 unit/mL injection Commonly known as:  LEVEMIR  
40 Units by SubCUTAneous route two (2) times a day. Insulin Needles (Disposable) 31 gauge x 1/4\" Ndle Commonly known as:  PEN NEEDLE, DIABETIC  
1 needle 3 times a day  
  
 levothyroxine 100 mcg tablet Commonly known as:  SYNTHROID Take 1 Tab by mouth Daily (before breakfast). Indications: hypothyroidism  
  
 lisinopril 10 mg tablet Commonly known as:  Robledo Bob Take 1 Tab by mouth daily. Indications: hypertension  
  
 metFORMIN 1,000 mg tablet Commonly known as:  GLUCOPHAGE Take 1 Tab by mouth two (2) times daily (with meals). Indications: type 2 diabetes mellitus nortriptyline 25 mg capsule Commonly known as:  PAMELOR Take 2 Caps by mouth nightly. ondansetron 8 mg disintegrating tablet Commonly known as:  ZOFRAN ODT Take 1 Tab by mouth every eight (8) hours as needed for Nausea. SUMAtriptan 50 mg tablet Commonly known as:  IMITREX Take 1 at onset of migraine - may repeat 1 tab by mouth in 2 hrs if needed Prescriptions Sent to Pharmacy Refills  
 lisinopril (PRINIVIL, ZESTRIL) 10 mg tablet 11 Sig: Take 1 Tab by mouth daily. Indications: hypertension Class: Normal  
 Pharmacy: [address] Ph #: [phone] Route: Oral  
 metFORMIN (GLUCOPHAGE) 1,000 mg tablet 11 Sig: Take 1 Tab by mouth two (2) times daily (with meals). Indications: type 2 diabetes mellitus Class: Normal  
 Pharmacy: [address] Ph #: [phone] Route: Oral  
 insulin detemir (LEVEMIR) 100 unit/mL injection 11 Si Units by SubCUTAneous route two (2) times a day. Class: Normal  
 Pharmacy: [address] Ph #: [phone] Route: SubCUTAneous  
 levothyroxine (SYNTHROID) 100 mcg tablet 0 Sig: Take 1 Tab by mouth Daily (before breakfast). Indications: hypothyroidism Class: Normal  
 Pharmacy: [address] Ph #: [phone] Route: Oral  
 nortriptyline (PAMELOR) 25 mg capsule 11 Sig: Take 2 Caps by mouth nightly. Class: Normal  
 Pharmacy: [address] Ph #: [phone] Route: Oral  
 SUMAtriptan (IMITREX) 50 mg tablet 11 Sig: Take 1 at onset of migraine - may repeat 1 tab by mouth in 2 hrs if needed  Class: Normal  
 Pharmacy: [facility] [address] [facility] #: [phone] We Performed the Following REFERRAL TO COLON AND RECTAL SURGERY [REF17 Custom] Comments:  
 Please evaluate for intractable hemorrhoids REFERRAL TO OPHTHALMOLOGY [REF57 Custom] Comments:  
 Please see for routine diabetic exam  
  
Follow-up Instructions Return in about 4 weeks (around 2/12/2018). Referral Information Referral ID Referred By Referred To  
  
 [id number] Alem Cao, [address] [facility] Jason ceja, [address] Phone: [phone] Fax: [phone] Visits Status Start Date End Date 1 New Request 1/15/18 1/15/19 If your referral has a status of pending review or denied, additional information will be sent to support the outcome of this decision. Referral ID Referred By Referred To  
 [id number] Kary Penaloza MD  
   [address] [place] [address] [address] Phone: [phone] Fax: [phone] Visits Status Start Date End Date 1 New Request 1/15/18 1/15/19 If your referral has a status of pending review or denied, additional information will be sent to support the outcome of this decision. Patient Instructions Learning About Type 2 Diabetes What is type 2 diabetes? Insulin is a hormone that helps your body use sugar from your food as energy. Type 2 diabetes happens when your body can't use insulin the right way. Over time, the pancreas can't make enough insulin. If you don't have enough insulin, too much sugar stays in your blood. If you are overweight, get little or no exercise, or have type 2 diabetes in your family, you are more likely to have problems with the way insulin works in your body.   Americans, Hispanics, Native Americans,  Americans, and Pacific Islanders have a higher risk for type 2 diabetes. Type 2 diabetes can be prevented or delayed with a healthy lifestyle, which includes staying at a healthy weight, making smart food choices, and getting regular exercise. What can you expect with type 2 diabetes? Winsome Buff keep hearing about how important it is to keep your blood sugar within a target range. That's because over time, high blood sugar can lead to serious problems. It can: 
· Harm your eyes, nerves, and kidneys. · Damage your blood vessels, leading to heart disease and stroke. · Reduce blood flow and cause nerve damage to parts of your body, especially your feet. This can cause slow healing and pain when you walk. · Make your immune system weak and less able to fight infections. When people hear the word \"diabetes,\" they often think of problems like these. But daily care and treatment can help prevent or delay these problems. The goal is to keep your blood sugar in a target range. That's the best way to reduce your chance of having more problems from diabetes. What are the symptoms? Some people who have type 2 diabetes may not have any symptoms early on. Many people with the disease don't even know they have it at first. But with time, diabetes starts to cause symptoms. You experience most symptoms of type 2 diabetes when your blood sugar is either too high or too low. The most common symptoms of high blood sugar include: · Thirst. 
· Frequent urination. · Weight loss. · Blurry vision. The symptoms of low blood sugar include: · Sweating. · Shakiness. · Weakness. · Hunger. · Confusion. How can you prevent type 2 diabetes? The best way to prevent or delay type 2 diabetes is to adopt healthy habits, which include: 
· Staying at a healthy weight. · Exercising regularly. · Eating healthy foods. How is type 2 diabetes treated? If you have type 2 diabetes, here are the most important things you can do. · Take your diabetes medicines. · Check your blood sugar as often as your doctor recommends. Also, get a hemoglobin A1c test at least every 6 months. · Try to eat a variety of foods and to spread carbohydrate throughout the day. Carbohydrate raises blood sugar higher and more quickly than any other nutrient does. Carbohydrate is found in sugar, breads and cereals, fruit, starchy vegetables such as potatoes and corn, and milk and yogurt. · Get at least 30 minutes of exercise on most days of the week. Walking is a good choice. You also may want to do other activities, such as running, swimming, cycling, or playing tennis or team sports. If your doctor says it's okay, do muscle-strengthening exercises at least 2 times a week. · See your doctor for checkups and tests on a regular schedule. · If you have high blood pressure or high cholesterol, take the medicines as prescribed by your doctor. · Do not smoke. Smoking can make health problems worse. This includes problems you might have with type 2 diabetes. If you need help quitting, talk to your doctor about stop-smoking programs and medicines. These can increase your chances of quitting for good. Follow-up care is a key part of your treatment and safety. Be sure to make and go to all appointments, and call your doctor if you are having problems. It's also a good idea to know your test results and keep a list of the medicines you take. Where can you learn more? Go to http://marilee-fahad.info/. Enter B559 in the search box to learn more about \"Learning About Type 2 Diabetes. \" Current as of: March 13, 2017 Content Version: 11.4 © 1518-7150 [employer], Incorporated. Care instructions adapted under license by [employer] (which disclaims liability or warranty for this information).  If you have questions about a medical condition or this instruction, always ask your healthcare professional. [employer] disclaims any warranty or liability for your use of this information. Introducing [facility]! Ruddy Aponte introduces [employer] patient portal. Now you can access parts of your medical record, email your doctor's office, and request medication refills online. 1. In your internet browser, go to [email or web address] 2. Click on the First Time User? Click Here link in the Sign In box. You will see the New Member Sign Up page. 3. Enter your [employer] Access Code exactly as it appears below. You will not need to use this code after youve completed the sign-up process. If you do not sign up before the expiration date, you must request a new code. · [employer] Access Code: [email or web address] Expires: 1/17/2018  1:13 AM 
 
4. Enter the last four digits of your Social Security Number (xxxx) and Date of Birth (mm/dd/yyyy) as indicated and click Submit. You will be taken to the next sign-up page. 5. Create a [employer] ID. This will be your [employer] login ID and cannot be changed, so think of one that is secure and easy to remember. 6. Create a [employer] password. You can change your password at any time. 7. Enter your Password Reset Question and Answer. This can be used at a later time if you forget your password. 8. Enter your e-mail address. You will receive e-mail notification when new information is available in [address] 9. Click Sign Up. You can now view and download portions of your medical record. 10. Click the Download Summary menu link to download a portable copy of your medical information. If you have questions, please visit the Frequently Asked Questions section of the [employer] website. Remember, [employer] is NOT to be used for urgent needs. For medical emergencies, dial 911. Now available from your iPhone and Android! Please provide this summary of care documentation to your next provider. Your primary care clinician is listed as Jeanell Holter. If you have any questions after today's visit, please call [phone].

## 2018-01-15 NOTE — PATIENT INSTRUCTIONS
Learning About Type 2 Diabetes  What is type 2 diabetes? Insulin is a hormone that helps your body use sugar from your food as energy. Type 2 diabetes happens when your body can't use insulin the right way. Over time, the pancreas can't make enough insulin. If you don't have enough insulin, too much sugar stays in your blood. If you are overweight, get little or no exercise, or have type 2 diabetes in your family, you are more likely to have problems with the way insulin works in your body.  Americans, Hispanics, Native Americans,  Americans, and Pacific Islanders have a higher risk for type 2 diabetes. Type 2 diabetes can be prevented or delayed with a healthy lifestyle, which includes staying at a healthy weight, making smart food choices, and getting regular exercise. What can you expect with type 2 diabetes? Brandee Young keep hearing about how important it is to keep your blood sugar within a target range. That's because over time, high blood sugar can lead to serious problems. It can:  · Harm your eyes, nerves, and kidneys. · Damage your blood vessels, leading to heart disease and stroke. · Reduce blood flow and cause nerve damage to parts of your body, especially your feet. This can cause slow healing and pain when you walk. · Make your immune system weak and less able to fight infections. When people hear the word \"diabetes,\" they often think of problems like these. But daily care and treatment can help prevent or delay these problems. The goal is to keep your blood sugar in a target range. That's the best way to reduce your chance of having more problems from diabetes. What are the symptoms? Some people who have type 2 diabetes may not have any symptoms early on. Many people with the disease don't even know they have it at first. But with time, diabetes starts to cause symptoms. You experience most symptoms of type 2 diabetes when your blood sugar is either too high or too low.   The most common symptoms of high blood sugar include:  · Thirst.  · Frequent urination. · Weight loss. · Blurry vision. The symptoms of low blood sugar include:  · Sweating. · Shakiness. · Weakness. · Hunger. · Confusion. How can you prevent type 2 diabetes? The best way to prevent or delay type 2 diabetes is to adopt healthy habits, which include:  · Staying at a healthy weight. · Exercising regularly. · Eating healthy foods. How is type 2 diabetes treated? If you have type 2 diabetes, here are the most important things you can do. · Take your diabetes medicines. · Check your blood sugar as often as your doctor recommends. Also, get a hemoglobin A1c test at least every 6 months. · Try to eat a variety of foods and to spread carbohydrate throughout the day. Carbohydrate raises blood sugar higher and more quickly than any other nutrient does. Carbohydrate is found in sugar, breads and cereals, fruit, starchy vegetables such as potatoes and corn, and milk and yogurt. · Get at least 30 minutes of exercise on most days of the week. Walking is a good choice. You also may want to do other activities, such as running, swimming, cycling, or playing tennis or team sports. If your doctor says it's okay, do muscle-strengthening exercises at least 2 times a week. · See your doctor for checkups and tests on a regular schedule. · If you have high blood pressure or high cholesterol, take the medicines as prescribed by your doctor. · Do not smoke. Smoking can make health problems worse. This includes problems you might have with type 2 diabetes. If you need help quitting, talk to your doctor about stop-smoking programs and medicines. These can increase your chances of quitting for good. Follow-up care is a key part of your treatment and safety. Be sure to make and go to all appointments, and call your doctor if you are having problems.  It's also a good idea to know your test results and keep a list of the medicines you take. Where can you learn more? Go to http://marilee-fahad.info/. Enter S306 in the search box to learn more about \"Learning About Type 2 Diabetes. \"  Current as of: March 13, 2017  Content Version: 11.4  © 7378-1896 Healthwise, [employer]. Care instructions adapted under license by [employer] (which disclaims liability or warranty for this information). If you have questions about a medical condition or this instruction, always ask your healthcare professional. [address] any warranty or liability for your use of this information.

## 2018-01-15 NOTE — PROGRESS NOTES
HISTORY OF PRESENT ILLNESS  Jeralene Gaucher is a 50 y.o. female. HPI Comments: Seen after a long hiatus in care for HTN, diabetes, hyperlipidemia, hypothyroidism, mild intermittent asthma, seasonal allergic rhinitis, migraines. She was without insurance for some period, and has not been on medications regularly for at least 4 months. She hasn't been seen in more than a year. She would like to be referred for definitive treatment of her hemorrhoids (started during pregnancy). She is noticing neuropathic symptoms in her feet. No eye exam in the past year. Follow-up   Associated symptoms include headaches. Pertinent negatives include no chest pain and no shortness of breath. Hypertension    Associated symptoms include headaches. Pertinent negatives include no chest pain, no palpitations, no shortness of breath, no nausea and no vomiting. Diabetes   Associated symptoms include headaches. Pertinent negatives include no chest pain and no shortness of breath. Past Medical History:   Diagnosis Date    Asthma     Bronchitis     Diabetes ([address]     Endocrine disease     hyperthyroidism    Endometriosis     Hypertension        Past Surgical History:   Procedure Laterality Date    HX GYN      partial hysterectomy    HX GYN      cervical laparoscopy; endometriosis    HX HYSTERECTOMY         History   Smoking Status    Never Smoker   Smokeless Tobacco    Never Used     Current Outpatient Prescriptions   Medication Sig    fluticasone-salmeterol (ADVAIR) 250-50 mcg/dose diskus inhaler Take 1 Puff by inhalation daily. (Patient taking differently: Take 1 Puff by inhalation daily as needed.)    albuterol (PROVENTIL HFA, VENTOLIN HFA, PROAIR HFA) 90 mcg/actuation inhaler Take 2 puffs by inhalation every four (4) hours as needed for Wheezing.  insulin NPH (NOVOLIN N) 100 unit/mL injection 30 units sq bid    lisinopril (PRINIVIL, ZESTRIL) 10 mg tablet Take 1 Tab by mouth daily.     metFORMIN (GLUCOPHAGE) 1,000 mg tablet Take 1 Tab by mouth two (2) times daily (with meals).  insulin detemir (LEVEMIR) 100 unit/mL injection 0.4 mL by SubCUTAneous route two (2) times a day.  levothyroxine (SYNTHROID) 125 mcg tablet Take 1 Tab by mouth Daily (before breakfast).  nortriptyline (PAMELOR) 25 mg capsule Take 1 Cap by mouth nightly. May increase to 2 pills in 3 days    ACCU-CHEK NURIA PLUS TEST STRP strip     Insulin Needles, Disposable, (INSULIN PEN NEEDLE) 31 gauge X 1/4 \" ndle 1 needle 3 times a day    SUMAtriptan (IMITREX) 50 mg tablet Take 1 Tab by mouth once as needed for Migraine for up to 1 dose. May repeat 1 tab by mouth in 2 hrs if no satisfactory response with initial tablet    ondansetron (ZOFRAN ODT) 8 mg disintegrating tablet Take 1 Tab by mouth every eight (8) hours as needed for Nausea.  cetirizine (ZYRTEC) 10 mg tablet Take 1 Tab by mouth daily. No current facility-administered medications for this visit. Review of Systems   Constitutional: Negative for chills and fever. Respiratory: Negative for shortness of breath. Cardiovascular: Negative for chest pain, palpitations and leg swelling. Gastrointestinal: Negative for nausea and vomiting. Neurological: Positive for tingling (both feet) and headaches. Psychiatric/Behavioral: The patient does not have insomnia. Visit Vitals    BP (!) 137/92    Pulse 82    Temp 98.2 °F (36.8 °C) (Oral)    Resp 16    Ht 5' 6\" (1.676 m)    Wt 162 lb (73.5 kg)    SpO2 100%    BMI 26.15 kg/m2       Physical Exam   Constitutional: She is oriented to person, place, and time. She appears well-developed and well-nourished. No distress. Neck: Neck supple. No thyromegaly present. Carotid bruit absent   Cardiovascular: Normal rate, regular rhythm and intact distal pulses. Exam reveals no gallop and no friction rub. No murmur heard. Pulmonary/Chest: Effort normal and breath sounds normal. No respiratory distress.    Musculoskeletal: She exhibits no edema. Lymphadenopathy:     She has no cervical adenopathy. Neurological: She is alert and oriented to person, place, and time. Skin: Skin is warm and dry. Psychiatric: She has a normal mood and affect. Her behavior is normal. Judgment and thought content normal.   Nursing note and vitals reviewed. ASSESSMENT and PLAN    ICD-10-CM ICD-9-CM    1. Essential hypertension I10 401.9 lisinopril (PRINIVIL, ZESTRIL) 10 mg tablet   2. Insulin dependent diabetes mellitus (HCC) E11.9 250.00     Z79.4 V58.67    3. Hypothyroidism, unspecified type E03.9 244.9 levothyroxine (SYNTHROID) 100 mcg tablet   4. Type 2 diabetes mellitus without complication, with long-term current use of insulin (HCC) E11.9 250.00 metFORMIN (GLUCOPHAGE) 1,000 mg tablet    Z79.4 V58.67 insulin detemir (LEVEMIR) 100 unit/mL injection      REFERRAL TO OPHTHALMOLOGY   5. Hyperlipidemia associated with type 2 diabetes mellitus (HCC) E11.69 250.80     E78.5 272.4    6. Mild intermittent asthma without complication Z44.98 243.99    7. Chronic seasonal allergic rhinitis, unspecified trigger J30.2 477.8    8. Hemorrhoids, unspecified hemorrhoid type K64.9 455.6 REFERRAL TO COLON AND RECTAL SURGERY   9. Intractable migraine without aura and without status migrainosus G43.019 346.11 nortriptyline (PAMELOR) 25 mg capsule      SUMAtriptan (IMITREX) 50 mg tablet     Follow-up Disposition:  Return in about 4 weeks (around 2/12/2018). the following changes in treatment are made: Refills as noted. Her Levothyroxine dose was never adjusted downward based on TSH in 2016, so I will decrease it to 100mcg now. Referrals to Ophth and Colorectal Surgery as noted. lab results and schedule of future lab studies reviewed with patient  reviewed medications and side effects in detail  Will work on HM topics at next visit. Plan of care reviewed - patient verbalize(s) understanding and agreement.

## 2018-02-16 ENCOUNTER — OFFICE VISIT (OUTPATIENT)
Dept: FAMILY MEDICINE CLINIC | Facility: CLINIC | Age: 48
End: 2018-02-16

## 2018-02-16 VITALS
TEMPERATURE: 97.5 F | HEART RATE: 85 BPM | OXYGEN SATURATION: 100 % | RESPIRATION RATE: 18 BRPM | DIASTOLIC BLOOD PRESSURE: 89 MMHG | HEIGHT: 66 IN | BODY MASS INDEX: 27.42 KG/M2 | WEIGHT: 170.6 LBS | SYSTOLIC BLOOD PRESSURE: 137 MMHG

## 2018-02-16 DIAGNOSIS — E11.40 TYPE 2 DIABETES, CONTROLLED, WITH NEUROPATHY (HCC): Primary | ICD-10-CM

## 2018-02-16 DIAGNOSIS — I10 ESSENTIAL HYPERTENSION: ICD-10-CM

## 2018-02-16 DIAGNOSIS — Z79.4 TYPE 2 DIABETES MELLITUS WITHOUT COMPLICATION, WITH LONG-TERM CURRENT USE OF INSULIN (HCC): ICD-10-CM

## 2018-02-16 DIAGNOSIS — E11.9 TYPE 2 DIABETES MELLITUS WITHOUT COMPLICATION, WITH LONG-TERM CURRENT USE OF INSULIN (HCC): ICD-10-CM

## 2018-02-16 DIAGNOSIS — E78.5 HYPERLIPIDEMIA ASSOCIATED WITH TYPE 2 DIABETES MELLITUS (HCC): ICD-10-CM

## 2018-02-16 DIAGNOSIS — E03.9 ACQUIRED HYPOTHYROIDISM: ICD-10-CM

## 2018-02-16 DIAGNOSIS — J30.2 CHRONIC SEASONAL ALLERGIC RHINITIS, UNSPECIFIED TRIGGER: ICD-10-CM

## 2018-02-16 DIAGNOSIS — G43.019 INTRACTABLE MIGRAINE WITHOUT AURA AND WITHOUT STATUS MIGRAINOSUS: ICD-10-CM

## 2018-02-16 DIAGNOSIS — J45.20 MILD INTERMITTENT ASTHMA WITHOUT COMPLICATION: ICD-10-CM

## 2018-02-16 DIAGNOSIS — E11.69 HYPERLIPIDEMIA ASSOCIATED WITH TYPE 2 DIABETES MELLITUS (HCC): ICD-10-CM

## 2018-02-16 RX ORDER — GABAPENTIN 100 MG/1
100 CAPSULE ORAL 3 TIMES DAILY
Qty: 90 CAP | Refills: 11 | Status: SHIPPED | OUTPATIENT
Start: 2018-02-16 | End: 2019-03-28 | Stop reason: SDUPTHER

## 2018-02-16 RX ORDER — INSULIN PUMP SYRINGE, 3 ML
EACH MISCELLANEOUS
Qty: 1 KIT | Refills: 0 | Status: SHIPPED | OUTPATIENT
Start: 2018-02-16

## 2018-02-16 NOTE — MR AVS SNAPSHOT
[address] [place] [address] 1 [facility] [address] 
[phone] Patient: Lily Mills MRN: [id number] :1970 Visit Information Date & Time Provider Department Dept. Phone Encounter #  
 2018  9:45 AM Ximena Khan MD [facility] [phone] [id number] Follow-up Instructions Return in about 3 months (around 2018). Upcoming Health Maintenance Date Due  
 EYE EXAM RETINAL OR DILATED Q1 1978 BREAST CANCER SCRN MAMMOGRAM 10/29/2016 LIPID PANEL Q1 2017 HEMOGLOBIN A1C Q6M 2017 FOOT EXAM Q1 2017 MICROALBUMIN Q1 2017 FOBT Q 1 YEAR AGE 50-75 2018 PAP AKA CERVICAL CYTOLOGY 3/5/2018 DTaP/Tdap/Td series (2 - Td) 2026 Allergies as of 2018  Review Complete On: 2018 By: Ximena Khan MD  
  
 Severity Noted Reaction Type Reactions Vicodin [Hydrocodone-acetaminophen]  2012    Shortness of Breath Current Immunizations  Never Reviewed Name Date Influenza Vaccine Chiara Puga) 10/15/2015 10:31 AM  
  
 Not reviewed this visit You Were Diagnosed With   
  
 Codes Comments Type 2 diabetes, controlled, with neuropathy ([facility]    -  Primary ICD-10-CM: E11.40 ICD-9-CM: 250.60, 357.2 Essential hypertension     ICD-10-CM: I10 
ICD-9-CM: 401.9 Hyperlipidemia associated with type 2 diabetes mellitus ([facility]     ICD-10-CM: E11.69, E78.5 ICD-9-CM: 250.80, 272.4 Acquired hypothyroidism     ICD-10-CM: E03.9 ICD-9-CM: 244.9 Intractable migraine without aura and without status migrainosus     ICD-10-CM: G43.019 
ICD-9-CM: 346.11 Mild intermittent asthma without complication     SHV-67-GW: J45.20 ICD-9-CM: 493.90 Chronic seasonal allergic rhinitis, unspecified trigger     ICD-10-CM: J30.2 ICD-9-CM: 477.8 Vitals BP Pulse Temp Resp Height(growth percentile) Weight(growth percentile) 137/89 (BP 1 Location: Left arm, BP Patient Position: Sitting) 85 97.5 °F (36.4 °C) (Oral) 18 5' 6\" (1.676 m) 170 lb 9.6 oz (77.4 kg) SpO2 BMI OB Status Smoking Status 100% 27.54 kg/m2 Hysterectomy Never Smoker BMI and BSA Data Body Mass Index Body Surface Area  
 27.54 kg/m 2 1.9 m 2 Preferred Pharmacy Pharmacy Name Phone [facility] [address] [place] [address] [phone] Your Updated Medication List  
  
   
This list is accurate as of: 2/16/18 10:59 AM.  Always use your most recent med list.  
  
  
  
  
 albuterol 90 mcg/actuation inhaler Commonly known as:  PROVENTIL HFA, VENTOLIN HFA, PROAIR HFA Take 2 puffs by inhalation every four (4) hours as needed for Wheezing. Blood-Glucose Meter monitoring kit Test twice daily  
  
 cetirizine 10 mg tablet Commonly known as:  ZyrTEC Take 1 Tab by mouth daily. fluticasone-salmeterol 250-50 mcg/dose diskus inhaler Commonly known as:  ADVAIR Take 1 Puff by inhalation daily. gabapentin 100 mg capsule Commonly known as:  NEURONTIN Take 1 Cap by mouth three (3) times daily. Indications: NEUROPATHIC PAIN  
  
 glucose blood VI test strips strip Commonly known as:  blood glucose test  
Use twice daily as directed, to match her glucometer  
  
 insulin detemir U-100 100 unit/mL injection Commonly known as:  LEVEMIR U-100 INSULIN  
40 Units by SubCUTAneous route two (2) times a day. Insulin Needles (Disposable) 31 gauge x 1/4\" Ndle Commonly known as:  PEN NEEDLE, DIABETIC  
1 needle 3 times a day  
  
 levothyroxine 100 mcg tablet Commonly known as:  SYNTHROID Take 1 Tab by mouth Daily (before breakfast). Indications: hypothyroidism  
  
 lisinopril 10 mg tablet Commonly known as:  Karen Beckman Take 1 Tab by mouth daily. Indications: hypertension  
  
 metFORMIN 1,000 mg tablet Commonly known as:  GLUCOPHAGE  
 Take 1 Tab by mouth two (2) times daily (with meals). Indications: type 2 diabetes mellitus  
  
 nortriptyline 25 mg capsule Commonly known as:  PAMELOR Take 2 Caps by mouth nightly. ondansetron 8 mg disintegrating tablet Commonly known as:  ZOFRAN ODT Take 1 Tab by mouth every eight (8) hours as needed for Nausea. SUMAtriptan 50 mg tablet Commonly known as:  IMITREX Take 1 at onset of migraine - may repeat 1 tab by mouth in 2 hrs if needed Prescriptions Sent to Pharmacy Refills  
 gabapentin (NEURONTIN) 100 mg capsule 11 Sig: Take 1 Cap by mouth three (3) times daily. Indications: NEUROPATHIC PAIN Class: Normal  
 Pharmacy: [address] Ph #: [phone] Route: Oral  
 Blood-Glucose Meter monitoring kit 0 Sig: Test twice daily Class: Normal  
 Pharmacy: [address] Ph #: [phone]  
 glucose blood VI test strips (BLOOD GLUCOSE TEST) strip 11 Sig: Use twice daily as directed, to match her glucometer Class: Normal  
 Pharmacy: [address] Ph #: [phone] Follow-up Instructions Return in about 3 months (around 5/16/2018). Introducing [facility]! [facility] introduces [facility] patient portal. Now you can access parts of your medical record, email your doctor's office, and request medication refills online. 1. In your internet browser, go to [email or web address] 2. Click on the First Time User? Click Here link in the Sign In box. You will see the New Member Sign Up page. 3. Enter your CM[facility] Access Code exactly as it appears below. You will not need to use this code after youve completed the sign-up process.  If you do not sign up before the expiration date, you must request a new code. · Cloudtop Access Code: [email or web address] Expires: 5/17/2018 10:58 AM 
 
4. Enter the last four digits of your Social Security Number (xxxx) and Date of Birth (mm/dd/yyyy) as indicated and click Submit. You will be taken to the next sign-up page. 5. Create a Cloudtop ID. This will be your Cloudtop login ID and cannot be changed, so think of one that is secure and easy to remember. 6. Create a Cloudtop password. You can change your password at any time. 7. Enter your Password Reset Question and Answer. This can be used at a later time if you forget your password. 8. Enter your e-mail address. You will receive e-mail notification when new information is available in [address] 9. Click Sign Up. You can now view and download portions of your medical record. 10. Click the Download Summary menu link to download a portable copy of your medical information. If you have questions, please visit the Frequently Asked Questions section of the Cloudtop website. Remember, Cloudtop is NOT to be used for urgent needs. For medical emergencies, dial 911. Now available from your iPhone and Android! Please provide this summary of care documentation to your next provider. Your primary care clinician is listed as Casper Conti. If you have any questions after today's visit, please call [phone].

## 2018-02-16 NOTE — PROGRESS NOTES
HISTORY OF PRESENT ILLNESS  Kem Worley is a 46 y.o. female. HPI Comments: Seen in follow-up for HTN, diabetes, hyperlipidemia, hypothyroidism, mild intermittent asthma, seasonal allergic rhinitis, migraines. She has been waking up feeling hypoglycemic since resuming Levemir at 40 units bid. Her glucometer is broken, so she can't check this. She has an eye appointment scheduled in March. She found that Pamelor 50mg gave her nausea, but 25mg is effective and not a problem. She continues to have painful tingling in her hands and feet. Diabetes   Pertinent negatives include no chest pain, no headaches and no shortness of breath. Hypertension    Pertinent negatives include no chest pain, no palpitations, no headaches, no shortness of breath, no nausea and no vomiting. Cholesterol Problem   Pertinent negatives include no chest pain, no headaches and no shortness of breath. Past Medical History:   Diagnosis Date    Asthma     Bronchitis     Diabetes ([address]     Endocrine disease     hyperthyroidism    Endometriosis     Hypertension        Past Surgical History:   Procedure Laterality Date    HX GYN      partial hysterectomy    HX GYN      cervical laparoscopy; endometriosis    HX HYSTERECTOMY         History   Smoking Status    Never Smoker   Smokeless Tobacco    Never Used     Current Outpatient Prescriptions   Medication Sig    lisinopril (PRINIVIL, ZESTRIL) 10 mg tablet Take 1 Tab by mouth daily. Indications: hypertension    metFORMIN (GLUCOPHAGE) 1,000 mg tablet Take 1 Tab by mouth two (2) times daily (with meals). Indications: type 2 diabetes mellitus    insulin detemir (LEVEMIR) 100 unit/mL injection 40 Units by SubCUTAneous route two (2) times a day.  levothyroxine (SYNTHROID) 100 mcg tablet Take 1 Tab by mouth Daily (before breakfast). Indications: hypothyroidism    nortriptyline (PAMELOR) 25 mg capsule Take 2 Caps by mouth nightly.     ACCU-CHEK NURIA PLUS TEST STRP strip     Insulin Needles, Disposable, (INSULIN PEN NEEDLE) 31 gauge X 1/4 \" ndle 1 needle 3 times a day    ondansetron (ZOFRAN ODT) 8 mg disintegrating tablet Take 1 Tab by mouth every eight (8) hours as needed for Nausea.  SUMAtriptan (IMITREX) 50 mg tablet Take 1 at onset of migraine - may repeat 1 tab by mouth in 2 hrs if needed    fluticasone-salmeterol (ADVAIR) 250-50 mcg/dose diskus inhaler Take 1 Puff by inhalation daily. (Patient taking differently: Take 1 Puff by inhalation daily as needed.)    cetirizine (ZYRTEC) 10 mg tablet Take 1 Tab by mouth daily.  albuterol (PROVENTIL HFA, VENTOLIN HFA, PROAIR HFA) 90 mcg/actuation inhaler Take 2 puffs by inhalation every four (4) hours as needed for Wheezing. No current facility-administered medications for this visit. Review of Systems   Constitutional: Negative for chills and fever. Respiratory: Negative for shortness of breath. Cardiovascular: Negative for chest pain, palpitations and leg swelling. Gastrointestinal: Negative for nausea and vomiting. Neurological: Positive for tingling. Negative for focal weakness and headaches. Psychiatric/Behavioral: The patient does not have insomnia. Visit Vitals    /89 (BP 1 Location: Left arm, BP Patient Position: Sitting)    Pulse 85    Temp 97.5 °F (36.4 °C) (Oral)    Resp 18    Ht 5' 6\" (1.676 m)    Wt 170 lb 9.6 oz (77.4 kg)    SpO2 100%    BMI 27.54 kg/m2       Physical Exam   Constitutional: She is oriented to person, place, and time. She appears well-developed and well-nourished. No distress. Neck: Neck supple. No thyromegaly present. Carotid bruit absent   Cardiovascular: Normal rate, regular rhythm and intact distal pulses. Exam reveals no gallop and no friction rub. No murmur heard. Pulmonary/Chest: Effort normal and breath sounds normal. No respiratory distress. Musculoskeletal: She exhibits no edema. Lymphadenopathy:     She has no cervical adenopathy. Neurological: She is alert and oriented to person, place, and time. Skin: Skin is warm and dry. Psychiatric: She has a normal mood and affect. Her behavior is normal. Judgment and thought content normal.   Nursing note and vitals reviewed. ASSESSMENT and PLAN    ICD-10-CM ICD-9-CM    1. Type 2 diabetes, controlled, with neuropathy ([facility]) E11.40 250.60 gabapentin (NEURONTIN) 100 mg capsule     357.2 Blood-Glucose Meter monitoring kit      glucose blood VI test strips (BLOOD GLUCOSE TEST) strip   2. Essential hypertension I10 401.9    3. Hyperlipidemia associated with type 2 diabetes mellitus ([facility]) E11.69 250.80     E78.5 272.4    4. Acquired hypothyroidism E03.9 244.9    5. Intractable migraine without aura and without status migrainosus G43.019 346.11    6. Mild intermittent asthma without complication U82.93 063.58    7. Chronic seasonal allergic rhinitis, unspecified trigger J30.2 477.8    8. Type 2 diabetes mellitus without complication, with long-term current use of insulin ([facility]) E11.9 250.00     Z79.4 V58.67      Follow-up Disposition:  Return in about 3 months (around 5/16/2018). the following changes in treatment are made: Decrease evening Levemir to 20 units. Add Gabapentin for neuropathic pain. Pamelor prescribed a t lower dose. New glucometer and test strips to match. Encouraged to keep her eye appointment. reviewed medications and side effects in detail  Plan of care reviewed - patient verbalize(s) understanding and agreement.

## 2018-02-16 NOTE — PROGRESS NOTES
Nic Headley is a  46 y.o. female presents today for office visit for routine follow up. 1. Have you been to the ER, urgent care clinic or hospitalized since your last visit? YES 10- [facility] and 10-    2. Have you seen or consulted any other health care providers outside of the [address] [place] [address] since your last visit (Include any pap smears or colon screening)?   NO

## 2018-07-03 ENCOUNTER — APPOINTMENT (OUTPATIENT)
Dept: GENERAL RADIOLOGY | Age: 48
End: 2018-07-03
Attending: PHYSICIAN ASSISTANT
Payer: COMMERCIAL

## 2018-07-03 ENCOUNTER — HOSPITAL ENCOUNTER (EMERGENCY)
Age: 48
Discharge: HOME | End: 2018-07-03
Attending: EMERGENCY MEDICINE
Payer: COMMERCIAL

## 2018-07-03 VITALS
DIASTOLIC BLOOD PRESSURE: 89 MMHG | TEMPERATURE: 98.5 F | RESPIRATION RATE: 20 BRPM | BODY MASS INDEX: 26.1 KG/M2 | OXYGEN SATURATION: 98 % | SYSTOLIC BLOOD PRESSURE: 129 MMHG | WEIGHT: 161.7 LBS | HEART RATE: 97 BPM

## 2018-07-03 DIAGNOSIS — E86.0 DEHYDRATION: ICD-10-CM

## 2018-07-03 DIAGNOSIS — J06.9 ACUTE UPPER RESPIRATORY INFECTION: ICD-10-CM

## 2018-07-03 DIAGNOSIS — J02.0 STREP THROAT: Primary | ICD-10-CM

## 2018-07-03 LAB
ALBUMIN SERPL-MCNC: 3.4 G/DL (ref 3.4–5)
ALBUMIN/GLOB SERPL: 0.8 {RATIO} (ref 0.8–1.7)
ALP SERPL-CCNC: 92 U/L (ref 45–117)
ALT SERPL-CCNC: 14 U/L (ref 13–56)
ANION GAP SERPL CALC-SCNC: 5 MMOL/L (ref 3–18)
APPEARANCE UR: CLEAR
AST SERPL-CCNC: 23 U/L (ref 15–37)
BASOPHILS # BLD: 0 K/UL (ref 0–0.1)
BASOPHILS NFR BLD: 0 % (ref 0–2)
BILIRUB SERPL-MCNC: 0.7 MG/DL (ref 0.2–1)
BILIRUB UR QL: NEGATIVE
BUN SERPL-MCNC: 7 MG/DL (ref 7–18)
BUN/CREAT SERPL: 8 (ref 12–20)
CALCIUM SERPL-MCNC: 8.9 MG/DL (ref 8.5–10.1)
CHLORIDE SERPL-SCNC: 105 MMOL/L (ref 100–108)
CO2 SERPL-SCNC: 32 MMOL/L (ref 21–32)
COLOR UR: YELLOW
CREAT SERPL-MCNC: 0.83 MG/DL (ref 0.6–1.3)
DIFFERENTIAL METHOD BLD: ABNORMAL
EOSINOPHIL # BLD: 0.1 K/UL (ref 0–0.4)
EOSINOPHIL NFR BLD: 2 % (ref 0–5)
ERYTHROCYTE [DISTWIDTH] IN BLOOD BY AUTOMATED COUNT: 13 % (ref 11.6–14.5)
GLOBULIN SER CALC-MCNC: 4.2 G/DL (ref 2–4)
GLUCOSE SERPL-MCNC: 223 MG/DL (ref 74–99)
GLUCOSE UR STRIP.AUTO-MCNC: 100 MG/DL
HCT VFR BLD AUTO: 39 % (ref 35–45)
HGB BLD-MCNC: 12.7 G/DL (ref 12–16)
HGB UR QL STRIP: NEGATIVE
KETONES UR QL STRIP.AUTO: ABNORMAL MG/DL
LEUKOCYTE ESTERASE UR QL STRIP.AUTO: NEGATIVE
LIPASE SERPL-CCNC: 48 U/L (ref 73–393)
LYMPHOCYTES # BLD: 1.5 K/UL (ref 0.9–3.6)
LYMPHOCYTES NFR BLD: 19 % (ref 21–52)
MCH RBC QN AUTO: 24.7 PG (ref 24–34)
MCHC RBC AUTO-ENTMCNC: 32.6 G/DL (ref 31–37)
MCV RBC AUTO: 75.9 FL (ref 74–97)
MONOCYTES # BLD: 0.5 K/UL (ref 0.05–1.2)
MONOCYTES NFR BLD: 7 % (ref 3–10)
NEUTS SEG # BLD: 5.6 K/UL (ref 1.8–8)
NEUTS SEG NFR BLD: 72 % (ref 40–73)
NITRITE UR QL STRIP.AUTO: NEGATIVE
PH UR STRIP: 5 [PH] (ref 5–8)
PLATELET # BLD AUTO: 221 K/UL (ref 135–420)
PMV BLD AUTO: 10 FL (ref 9.2–11.8)
POTASSIUM SERPL-SCNC: 3.7 MMOL/L (ref 3.5–5.5)
PROT SERPL-MCNC: 7.6 G/DL (ref 6.4–8.2)
PROT UR STRIP-MCNC: NEGATIVE MG/DL
RBC # BLD AUTO: 5.14 M/UL (ref 4.2–5.3)
SODIUM SERPL-SCNC: 142 MMOL/L (ref 136–145)
SP GR UR REFRACTOMETRY: 1.02 (ref 1–1.03)
UROBILINOGEN UR QL STRIP.AUTO: 0.2 EU/DL (ref 0.2–1)
WBC # BLD AUTO: 7.7 K/UL (ref 4.6–13.2)

## 2018-07-03 PROCEDURE — 87081 CULTURE SCREEN ONLY: CPT | Performed by: PHYSICIAN ASSISTANT

## 2018-07-03 PROCEDURE — 80053 COMPREHEN METABOLIC PANEL: CPT | Performed by: PHYSICIAN ASSISTANT

## 2018-07-03 PROCEDURE — 71046 X-RAY EXAM CHEST 2 VIEWS: CPT

## 2018-07-03 PROCEDURE — 74011250636 HC RX REV CODE- 250/636: Performed by: PHYSICIAN ASSISTANT

## 2018-07-03 PROCEDURE — 96360 HYDRATION IV INFUSION INIT: CPT

## 2018-07-03 PROCEDURE — 85025 COMPLETE CBC W/AUTO DIFF WBC: CPT | Performed by: PHYSICIAN ASSISTANT

## 2018-07-03 PROCEDURE — 96372 THER/PROPH/DIAG INJ SC/IM: CPT

## 2018-07-03 PROCEDURE — 81003 URINALYSIS AUTO W/O SCOPE: CPT | Performed by: PHYSICIAN ASSISTANT

## 2018-07-03 PROCEDURE — 99282 EMERGENCY DEPT VISIT SF MDM: CPT

## 2018-07-03 PROCEDURE — 83690 ASSAY OF LIPASE: CPT | Performed by: PHYSICIAN ASSISTANT

## 2018-07-03 RX ADMIN — PENICILLIN G BENZATHINE 1.2 MILLION UNITS: 1200000 INJECTION, SUSPENSION INTRAMUSCULAR at 15:38

## 2018-07-03 RX ADMIN — SODIUM CHLORIDE 1000 ML: 900 INJECTION, SOLUTION INTRAVENOUS at 14:29

## 2018-07-03 NOTE — DISCHARGE INSTRUCTIONS
Strep Throat: Care Instructions  Your Care Instructions    Strep throat is a bacterial infection that causes sudden, severe sore throat and fever. Strep throat, which is caused by bacteria called streptococcus, is treated with antibiotics. Sometimes a strep test is necessary to tell if the sore throat is caused by strep bacteria. Treatment can help ease symptoms and may prevent future problems. Follow-up care is a key part of your treatment and safety. Be sure to make and go to all appointments, and call your doctor if you are having problems. It's also a good idea to know your test results and keep a list of the medicines you take. How can you care for yourself at home? · Take your antibiotics as directed. Do not stop taking them just because you feel better. You need to take the full course of antibiotics. · Strep throat can spread to others until 24 hours after you begin taking antibiotics. During this time, you should avoid contact with other people at work or home, especially infants and children. Do not sneeze or cough on others, and wash your hands often. Keep your drinking glass and eating utensils separate from those of others, and wash these items well in hot, soapy water. · Gargle with warm salt water at least once each hour to help reduce swelling and make your throat feel better. Use 1 teaspoon of salt mixed in 8 fluid ounces of warm water. · Take an over-the-counter pain medication, such as acetaminophen (Tylenol), ibuprofen (Advil, Motrin), or naproxen (Aleve). Read and follow all instructions on the label. · Try an over-the-counter anesthetic throat spray or throat lozenges, which may help relieve throat pain. · Drink plenty of fluids. Fluids may help soothe an irritated throat. Hot fluids, such as tea or soup, may help your throat feel better. · Eat soft solids and drink plenty of clear liquids.  Flavored ice pops, ice cream, scrambled eggs, sherbet, and gelatin dessert (such as Jell-O) may also soothe the throat. · Get lots of rest.  · Do not smoke, and avoid secondhand smoke. If you need help quitting, talk to your doctor about stop-smoking programs and medicines. These can increase your chances of quitting for good. · Use a vaporizer or humidifier to add moisture to the air in your bedroom. Follow the directions for cleaning the machine. When should you call for help? Call your doctor now or seek immediate medical care if:  ? · You have a new or higher fever. ? · You have a fever with a stiff neck or severe headache. ? · You have new or worse trouble swallowing. ? · Your sore throat gets much worse on one side. ? · Your pain becomes much worse on one side of your throat. ? Watch closely for changes in your health, and be sure to contact your doctor if:  ? · You are not getting better after 2 days (48 hours). ? · You do not get better as expected. Where can you learn more? Go to http://marilee-fahad.info/. Enter K625 in the search box to learn more about \"Strep Throat: Care Instructions. \"  Current as of: May 12, 2017  Content Version: 11.4  © 9057-6802 [employer]. Care instructions adapted under license by [employer] (which disclaims liability or warranty for this information). If you have questions about a medical condition or this instruction, always ask your healthcare professional. [place] [address] any warranty or liability for your use of this information. Dehydration: Care Instructions  Your Care Instructions  Dehydration happens when your body loses too much fluid. This might happen when you do not drink enough water or you lose large amounts of fluids from your body because of diarrhea, vomiting, or sweating. Severe dehydration can be life-threatening. Water and minerals called electrolytes help put your body fluids back in balance.  Learn the early signs of fluid loss, and drink more fluids to prevent dehydration. Follow-up care is a key part of your treatment and safety. Be sure to make and go to all appointments, and call your doctor if you are having problems. It's also a good idea to know your test results and keep a list of the medicines you take. How can you care for yourself at home? · To prevent dehydration, drink plenty of fluids, enough so that your urine is light yellow or clear like water. Choose water and other caffeine-free clear liquids until you feel better. If you have kidney, heart, or liver disease and have to limit fluids, talk with your doctor before you increase the amount of fluids you drink. · If you do not feel like eating or drinking, try taking small sips of water, sports drinks, or other rehydration drinks. · Get plenty of rest.  To prevent dehydration  · Add more fluids to your diet and daily routine, unless your doctor has told you not to. · During hot weather, drink more fluids. Drink even more fluids if you exercise a lot. Stay away from drinks with alcohol or caffeine. · Watch for the symptoms of dehydration. These include:  ¨ A dry, sticky mouth. ¨ Dark yellow urine, and not much of it. ¨ Dry and sunken eyes. ¨ Feeling very tired. · Learn what problems can lead to dehydration. These include:  ¨ Diarrhea, fever, and vomiting. ¨ Any illness with a fever, such as pneumonia or the flu. ¨ Activities that cause heavy sweating, such as endurance races and heavy outdoor work in hot or humid weather. ¨ Alcohol or drug abuse or withdrawal.  ¨ Certain medicines, such as cold and allergy pills (antihistamines), diet pills (diuretics), and laxatives. ¨ Certain diseases, such as diabetes, cancer, and heart or kidney disease. When should you call for help? Call 911 anytime you think you may need emergency care. For example, call if:  ? · You passed out (lost consciousness).    ?Call your doctor now or seek immediate medical care if:  ? · You are confused and cannot think clearly. ? · You are dizzy or lightheaded, or you feel like you may faint. ? · You have signs of needing more fluids. You have sunken eyes and a dry mouth, and you pass only a little dark urine. ? · You cannot keep fluids down. ? Watch closely for changes in your health, and be sure to contact your doctor if:  ? · You are not making tears. ? · Your skin is very dry and sags slowly back into place after you pinch it. ? · Your mouth and eyes are very dry. Where can you learn more? Go to http://marilee-fahad.info/. Enter O498 in the search box to learn more about \"Dehydration: Care Instructions. \"  Current as of: March 20, 2017  Content Version: 11.4  © 9600-2970 [employer]. Care instructions adapted under license by [employer] (which disclaims liability or warranty for this information). If you have questions about a medical condition or this instruction, always ask your healthcare professional. [place] [address] any warranty or liability for your use of this information. Upper Respiratory Infection (Cold): Care Instructions  Your Care Instructions    An upper respiratory infection, or URI, is an infection of the nose, sinuses, or throat. URIs are spread by coughs, sneezes, and direct contact. The common cold is the most frequent kind of URI. The flu and sinus infections are other kinds of URIs. Almost all URIs are caused by viruses. Antibiotics won't cure them. But you can treat most infections with home care. This may include drinking lots of fluids and taking over-the-counter pain medicine. You will probably feel better in 4 to 10 days. The doctor has checked you carefully, but problems can develop later. If you notice any problems or new symptoms, get medical treatment right away. Follow-up care is a key part of your treatment and safety.  Be sure to make and go to all appointments, and call your doctor if you are having problems. It's also a good idea to know your test results and keep a list of the medicines you take. How can you care for yourself at home? · To prevent dehydration, drink plenty of fluids, enough so that your urine is light yellow or clear like water. Choose water and other caffeine-free clear liquids until you feel better. If you have kidney, heart, or liver disease and have to limit fluids, talk with your doctor before you increase the amount of fluids you drink. · Take an over-the-counter pain medicine, such as acetaminophen (Tylenol), ibuprofen (Advil, Motrin), or naproxen (Aleve). Read and follow all instructions on the label. · Before you use cough and cold medicines, check the label. These medicines may not be safe for young children or for people with certain health problems. · Be careful when taking over-the-counter cold or flu medicines and Tylenol at the same time. Many of these medicines have acetaminophen, which is Tylenol. Read the labels to make sure that you are not taking more than the recommended dose. Too much acetaminophen (Tylenol) can be harmful. · Get plenty of rest.  · Do not smoke or allow others to smoke around you. If you need help quitting, talk to your doctor about stop-smoking programs and medicines. These can increase your chances of quitting for good. When should you call for help? Call 911 anytime you think you may need emergency care. For example, call if:  ? · You have severe trouble breathing. ?Call your doctor now or seek immediate medical care if:  ? · You seem to be getting much sicker. ? · You have new or worse trouble breathing. ? · You have a new or higher fever. ? · You have a new rash. ? Watch closely for changes in your health, and be sure to contact your doctor if:  ? · You have a new symptom, such as a sore throat, an earache, or sinus pain.    ? · You cough more deeply or more often, especially if you notice more mucus or a change in the color of your mucus.   ? · You do not get better as expected. Where can you learn more? Go to http://marilee-fahad.info/. Enter K818 in the search box to learn more about \"Upper Respiratory Infection (Cold): Care Instructions. \"  Current as of: May 12, 2017  Content Version: 11.4  © 4526-1193 [employer]. Care instructions adapted under license by [employer] (which disclaims liability or warranty for this information). If you have questions about a medical condition or this instruction, always ask your healthcare professional. [address] any warranty or liability for your use of this information. [employer] Activation    Thank you for requesting access to [employer]. Please follow the instructions below to securely access and download your online medical record. [employer] allows you to send messages to your doctor, view your test results, renew your prescriptions, schedule appointments, and more. How Do I Sign Up? 1. In your internet browser, go to [email or web address]  2. Click on the First Time User? Click Here link in the Sign In box. You will be redirect to the New Member Sign Up page. 3. Enter your [employer] Access Code exactly as it appears below. You will not need to use this code after youve completed the sign-up process. If you do not sign up before the expiration date, you must request a new code. [employer] Access Code: [email or web address]  Expires: 10/1/2018  1:33 PM (This is the date your [employer] access code will )    4. Enter the last four digits of your Social Security Number (xxxx) and Date of Birth (mm/dd/yyyy) as indicated and click Submit. You will be taken to the next sign-up page. 5. Create a [employer] ID. This will be your [employer] login ID and cannot be changed, so think of one that is secure and easy to remember. 6. Create a [employer] password. You can change your password at any time. 7. Enter your Password Reset Question and Answer. This can be used at a later time if you forget your password. 8. Enter your e-mail address. You will receive e-mail notification when new information is available in [address] 9. Click Sign Up. You can now view and download portions of your medical record. 10. Click the Download Summary menu link to download a portable copy of your medical information. Additional Information    If you have questions, please visit the Frequently Asked Questions section of the [employer] website at [email or web address] Remember, [employer] is NOT to be used for urgent needs. For medical emergencies, dial 911.

## 2018-07-03 NOTE — ED PROVIDER NOTES
EMERGENCY DEPARTMENT HISTORY AND PHYSICAL EXAM    1:53 PM      Date: 7/3/2018  Patient Name: Kath Suárez    History of Presenting Illness     Chief Complaint   Patient presents with    Sore Throat    Abdominal Pain         History Provided By: Patient    Chief Complaint: sore throat  Duration:  1 week  Timing:  Constant  Location: throat  Quality: Pressure  Severity: Moderate  Modifying Factors: Pt has had sick contact. Associated Symptoms:  diarrhea, fatigue, light headedness, nausea, and fever. Denies fever, vomiting, dysuria, cough, and hematuria. Additional History (Context): Kath Suárez is a 46 y.o. female with diabetes and hypertension who presents with constant moderate sore throat for the past week. Associated sx are diarrhea, fatigue, light headedness, nausea, and fever. Denies  vomiting, dysuria, cough, and hematuria. Pt has had sick contact, her daughter was recently diagnosed with strep. Her blood sugar was 189 today. Pt drinks but does not smoke. PCP: Ghulam Sharp MD    Current Facility-Administered Medications   Medication Dose Route Frequency Provider Last Rate Last Dose    sodium chloride 0.9 % bolus infusion 1,000 mL  1,000 mL IntraVENous ONCE En A RADHA Rivera        penicillin g benzathine (BICILLIN LA) 1,200,000 unit/2 mL IM injection 1.2 Million Units  1.2 Million Units IntraMUSCular ONCE Kelly Camarillo         Current Outpatient Prescriptions   Medication Sig Dispense Refill    gabapentin (NEURONTIN) 100 mg capsule Take 1 Cap by mouth three (3) times daily. Indications: NEUROPATHIC PAIN 90 Cap 11    Blood-Glucose Meter monitoring kit Test twice daily 1 Kit 0    glucose blood VI test strips (BLOOD GLUCOSE TEST) strip Use twice daily as directed, to match her glucometer 100 Strip 11    lisinopril (PRINIVIL, ZESTRIL) 10 mg tablet Take 1 Tab by mouth daily.  Indications: hypertension 30 Tab 11    metFORMIN (GLUCOPHAGE) 1,000 mg tablet Take 1 Tab by mouth two (2) times daily (with meals). Indications: type 2 diabetes mellitus 60 Tab 11    insulin detemir (LEVEMIR) 100 unit/mL injection 40 Units by SubCUTAneous route two (2) times a day. (Patient taking differently: by SubCUTAneous route. 40 units in am, 20 units in pm  Indications: type 2 diabetes mellitus) 2 Vial 11    levothyroxine (SYNTHROID) 100 mcg tablet Take 1 Tab by mouth Daily (before breakfast). Indications: hypothyroidism 90 Tab 0    nortriptyline (PAMELOR) 25 mg capsule Take 2 Caps by mouth nightly. (Patient taking differently: Take 25 mg by mouth nightly.) 60 Cap 11    SUMAtriptan (IMITREX) 50 mg tablet Take 1 at onset of migraine - may repeat 1 tab by mouth in 2 hrs if needed 10 Tab 11    Insulin Needles, Disposable, (INSULIN PEN NEEDLE) 31 gauge X 1/4 \" ndle 1 needle 3 times a day 30 Each 3    ondansetron (ZOFRAN ODT) 8 mg disintegrating tablet Take 1 Tab by mouth every eight (8) hours as needed for Nausea. 20 Tab 0    fluticasone-salmeterol (ADVAIR) 250-50 mcg/dose diskus inhaler Take 1 Puff by inhalation daily. (Patient taking differently: Take 1 Puff by inhalation daily as needed.) 1 Inhaler 3    cetirizine (ZYRTEC) 10 mg tablet Take 1 Tab by mouth daily. 90 Tab 3    albuterol (PROVENTIL HFA, VENTOLIN HFA, PROAIR HFA) 90 mcg/actuation inhaler Take 2 puffs by inhalation every four (4) hours as needed for Wheezing.  1 Inhaler 0       Past History     Past Medical History:  Past Medical History:   Diagnosis Date    Asthma     Bronchitis     Diabetes ([address]     Endocrine disease     hyperthyroidism    Endometriosis     Hypertension        Past Surgical History:  Past Surgical History:   Procedure Laterality Date    HX GYN      partial hysterectomy    HX GYN      cervical laparoscopy; endometriosis    HX HYSTERECTOMY         Family History:  Family History   Problem Relation Age of Onset    Cancer Mother     Diabetes Mother     Dementia Mother     Diabetes Father        Social History:  Social History   Substance Use Topics    Smoking status: Never Smoker    Smokeless tobacco: Never Used    Alcohol use 0.0 oz/week     0 Standard drinks or equivalent per week      Comment: occasional       Allergies: Allergies   Allergen Reactions    Vicodin [Hydrocodone-Acetaminophen] Shortness of Breath         Review of Systems       Review of Systems   Constitutional: Positive for fatigue and fever. Negative for chills. HENT: Positive for sore throat. Negative for dental problem. Respiratory: Positive for cough. Negative for shortness of breath. Cardiovascular: Negative for chest pain. Gastrointestinal: Positive for diarrhea and nausea. Negative for abdominal pain and vomiting. Genitourinary: Negative for dysuria and hematuria. Skin: Negative for rash. Neurological: Positive for light-headedness. Negative for weakness. All other systems reviewed and are negative. Physical Exam     Visit Vitals    /89 (BP 1 Location: Left arm, BP Patient Position: Sitting)    Pulse (!) 113    Temp 98.5 °F (36.9 °C)    Resp 20    Wt 73.3 kg (161 lb 11.2 oz)    SpO2 98%    BMI 26.1 kg/m2         Physical Exam   Constitutional: She appears well-developed and well-nourished. No distress. HENT:   Head: Normocephalic and atraumatic. Right Ear: Tympanic membrane and external ear normal.   Left Ear: Tympanic membrane and external ear normal.   Nose: Nose normal.   Mouth/Throat: Posterior oropharyngeal erythema present. No oropharyngeal exudate. Eyes: Pupils are equal, round, and reactive to light. Neck: Normal range of motion. Neck supple. No nuchal rigidity    Cardiovascular: Regular rhythm and normal heart sounds. Exam reveals no gallop and no friction rub. No murmur heard. tachycardia   Pulmonary/Chest: Effort normal and breath sounds normal. No stridor. No respiratory distress. She has no wheezes. She has no rales. Abdominal: Soft.  Bowel sounds are normal. She exhibits no distension and no mass. There is no tenderness. There is no rebound and no guarding. Lymphadenopathy:     She has no cervical adenopathy. Neurological: She is alert. Skin: Skin is warm. No rash noted. She is not diaphoretic. Nursing note and vitals reviewed. Diagnostic Study Results     Labs -  Recent Results (from the past 12 hour(s))   URINALYSIS W/ RFLX MICROSCOPIC    Collection Time: 07/03/18  1:34 PM   Result Value Ref Range    Color YELLOW      Appearance CLEAR      Specific gravity 1.019 1.005 - 1.030      pH (UA) 5.0 5.0 - 8.0      Protein NEGATIVE  NEG mg/dL    Glucose 100 (A) NEG mg/dL    Ketone TRACE (A) NEG mg/dL    Bilirubin NEGATIVE  NEG      Blood NEGATIVE  NEG      Urobilinogen 0.2 0.2 - 1.0 EU/dL    Nitrites NEGATIVE  NEG      Leukocyte Esterase NEGATIVE  NEG     STREP THROAT SCREEN    Collection Time: 07/03/18  2:35 PM   Result Value Ref Range    Special Requests: NO SPECIAL REQUESTS      Strep Screen POSITIVE      Culture result: PENDING    CBC WITH AUTOMATED DIFF    Collection Time: 07/03/18  2:42 PM   Result Value Ref Range    WBC 7.7 4.6 - 13.2 K/uL    RBC 5.14 4.20 - 5.30 M/uL    HGB 12.7 12.0 - 16.0 g/dL    HCT 39.0 35.0 - 45.0 %    MCV 75.9 74.0 - 97.0 FL    MCH 24.7 24.0 - 34.0 PG    MCHC 32.6 31.0 - 37.0 g/dL    RDW 13.0 11.6 - 14.5 %    PLATELET 455 449 - 742 K/uL    MPV 10.0 9.2 - 11.8 FL    NEUTROPHILS 72 40 - 73 %    LYMPHOCYTES 19 (L) 21 - 52 %    MONOCYTES 7 3 - 10 %    EOSINOPHILS 2 0 - 5 %    BASOPHILS 0 0 - 2 %    ABS. NEUTROPHILS 5.6 1.8 - 8.0 K/UL    ABS. LYMPHOCYTES 1.5 0.9 - 3.6 K/UL    ABS. MONOCYTES 0.5 0.05 - 1.2 K/UL    ABS. EOSINOPHILS 0.1 0.0 - 0.4 K/UL    ABS.  BASOPHILS 0.0 0.0 - 0.1 K/UL    DF AUTOMATED     METABOLIC PANEL, COMPREHENSIVE    Collection Time: 07/03/18  2:42 PM   Result Value Ref Range    Sodium 142 136 - 145 mmol/L    Potassium 3.7 3.5 - 5.5 mmol/L    Chloride 105 100 - 108 mmol/L    CO2 32 21 - 32 mmol/L    Anion gap 5 3.0 - 18 mmol/L    Glucose 223 (H) 74 - 99 mg/dL    BUN 7 7.0 - 18 MG/DL    Creatinine 0.83 0.6 - 1.3 MG/DL    BUN/Creatinine ratio 8 (L) 12 - 20      GFR est AA >60 >60 ml/min/1.73m2    GFR est non-AA >60 >60 ml/min/1.73m2    Calcium 8.9 8.5 - 10.1 MG/DL    Bilirubin, total 0.7 0.2 - 1.0 MG/DL    ALT (SGPT) 14 13 - 56 U/L    AST (SGOT) 23 15 - 37 U/L    Alk. phosphatase 92 45 - 117 U/L    Protein, total 7.6 6.4 - 8.2 g/dL    Albumin 3.4 3.4 - 5.0 g/dL    Globulin 4.2 (H) 2.0 - 4.0 g/dL    A-G Ratio 0.8 0.8 - 1.7     LIPASE    Collection Time: 07/03/18  2:42 PM   Result Value Ref Range    Lipase 48 (L) 73 - 393 U/L       Radiologic Studies -   XR CHEST PA LAT   IMPRESSION:    Negative chest.            Medical Decision Making   I am the first provider for this patient. I reviewed the vital signs, available nursing notes, past medical history, past surgical history, family history and social history. Vital Signs-Reviewed the patient's vital signs. Pulse Oximetry Analysis -  98% on room air     Records Reviewed: Nursing Notes and Old Medical Records (Time of Review: 1:53 PM)    ED Course: Progress Notes, Reevaluation, and Consults:  3:22 PM Reviewed results with patient and family. Patient requesting PCN shot. Discussed need for close outpatient follow-up. Discussed strict return precautions, including fever, vomiting, or any other medical concerns. Provider Notes (Medical Decision Making): 45 yo F who presents due to sore throat, fatigue, and lightheadedness x days. Afebrile, looks well, non-toxic appearing. Recent exposure to daughter with strep. Posterior oropharyngeal erythema without edema or exudates. Strep positive. CXR without acute process. Tachycardia likely 2/2 dehydration, improved with IVF. Stable for d/c with outpatient follow-up. Diagnosis     Clinical Impression:   1. Strep throat    2. Acute upper respiratory infection    3.  Dehydration        Disposition: home     Follow-up Information Follow up With Details Comments Contact Colin Stock MD Go in 2 days For Follow up [address] [place] [address]  [phone]      [facility] EMERGENCY DEPT Go to As needed, If symptoms worsen [address] [address]  [phone]           Patient's Medications   Start Taking    No medications on file   Continue Taking    ALBUTEROL (PROVENTIL HFA, VENTOLIN HFA, PROAIR HFA) 90 MCG/ACTUATION INHALER    Take 2 puffs by inhalation every four (4) hours as needed for Wheezing. BLOOD-GLUCOSE METER MONITORING KIT    Test twice daily    CETIRIZINE (ZYRTEC) 10 MG TABLET    Take 1 Tab by mouth daily. FLUTICASONE-SALMETEROL (ADVAIR) 250-50 MCG/DOSE DISKUS INHALER    Take 1 Puff by inhalation daily. GABAPENTIN (NEURONTIN) 100 MG CAPSULE    Take 1 Cap by mouth three (3) times daily. Indications: NEUROPATHIC PAIN    GLUCOSE BLOOD VI TEST STRIPS (BLOOD GLUCOSE TEST) STRIP    Use twice daily as directed, to match her glucometer    INSULIN DETEMIR (LEVEMIR) 100 UNIT/ML INJECTION    40 Units by SubCUTAneous route two (2) times a day. INSULIN NEEDLES, DISPOSABLE, (INSULIN PEN NEEDLE) 31 GAUGE X 1/4 \" NDLE    1 needle 3 times a day    LEVOTHYROXINE (SYNTHROID) 100 MCG TABLET    Take 1 Tab by mouth Daily (before breakfast). Indications: hypothyroidism    LISINOPRIL (PRINIVIL, ZESTRIL) 10 MG TABLET    Take 1 Tab by mouth daily. Indications: hypertension    METFORMIN (GLUCOPHAGE) 1,000 MG TABLET    Take 1 Tab by mouth two (2) times daily (with meals). Indications: type 2 diabetes mellitus    NORTRIPTYLINE (PAMELOR) 25 MG CAPSULE    Take 2 Caps by mouth nightly. ONDANSETRON (ZOFRAN ODT) 8 MG DISINTEGRATING TABLET    Take 1 Tab by mouth every eight (8) hours as needed for Nausea.     SUMATRIPTAN (IMITREX) 50 MG TABLET    Take 1 at onset of migraine - may repeat 1 tab by mouth in 2 hrs if needed   These Medications have changed    No medications on file   Stop Taking    No medications on file        Scribe [id number] acting as a scribe for and in the presence of Fide Ho PA-C        July 03, 2018 at 1:53 PM       Provider Attestation:      I personally performed the services described in the documentation, reviewed the documentation, as recorded by the scribe in my presence, and it accurately and completely records my words and actions.  July 03, 2018 at 1:53 PM -  Fide Ho PA-C

## 2018-07-03 NOTE — ED NOTES
Feeling sick. Abdominal pain, dysuria, cough, fevers. Tachycardia, afebrile. Does not meet sepsis protocol at this time but will need work up. I performed a brief evaluation, including history and physical, of the patient here in triage and I have determined that pt will need further treatment and evaluation from the main side ER physician. I have placed initial orders to help in expediting patients care.      July 03, 2018 at 1:37 PM - Peter Rivera PA-C        Visit Vitals    /89 (BP 1 Location: Left arm, BP Patient Position: Sitting)    Pulse (!) 113    Temp 98.5 °F (36.9 °C)    Resp 20    SpO2 98%

## 2018-07-03 NOTE — LETTER
[address] [place] [address] [facility] EMERGENCY DEPT 
[address] [facility] [address] 
[phone] Work/School Note Date: 7/3/2018 To Whom It May concern: 
 
Chas Lara was seen and treated today in the emergency room by the following provider(s): 
Attending Provider: Katrina Aly MD 
Physician Assistant: Ellie Mcgregor. Chas Lara may return to work on 7/5/18. Sincerely, 
 
 
 
 
Ellie Mcgregor

## 2018-07-03 NOTE — ED TRIAGE NOTES
Pt states has not been feeling well at that some of her co-workers have been diagnosed with strep. Reports throat pain with intermittent fevers.  Pt states that she is a diabetic

## 2018-07-04 LAB
B-HEM STREP THROAT QL CULT: POSITIVE
BACTERIA SPEC CULT: ABNORMAL
SERVICE CMNT-IMP: ABNORMAL

## 2018-10-07 ENCOUNTER — HOSPITAL ENCOUNTER (EMERGENCY)
Age: 48
Discharge: HOME | End: 2018-10-07
Attending: EMERGENCY MEDICINE
Payer: COMMERCIAL

## 2018-10-07 VITALS
HEART RATE: 90 BPM | HEIGHT: 66 IN | TEMPERATURE: 98.9 F | DIASTOLIC BLOOD PRESSURE: 105 MMHG | SYSTOLIC BLOOD PRESSURE: 160 MMHG | OXYGEN SATURATION: 98 % | WEIGHT: 148 LBS | RESPIRATION RATE: 16 BRPM | BODY MASS INDEX: 23.78 KG/M2

## 2018-10-07 DIAGNOSIS — K04.7 DENTAL ABSCESS: Primary | ICD-10-CM

## 2018-10-07 PROCEDURE — 99281 EMR DPT VST MAYX REQ PHY/QHP: CPT

## 2018-10-07 RX ORDER — PENICILLIN V POTASSIUM 500 MG/1
500 TABLET, FILM COATED ORAL 4 TIMES DAILY
Qty: 28 TAB | Refills: 0 | Status: SHIPPED | OUTPATIENT
Start: 2018-10-07 | End: 2018-10-14

## 2018-10-07 NOTE — ED TRIAGE NOTES
Patient reports tooth broke 3 month ago and states needs root canal.  States 3 days ago pain has developed along with facial swelling. Pt in NAD. States unable to get dentist to return call today.

## 2018-10-07 NOTE — LETTER
[facility] EMERGENCY DEPT 
[address] [facility] [address] 
[phone] Work/School Note Date: 10/7/2018 To Whom It May concern: 
 
Tresa Askew was seen and treated today in the emergency room by the following provider(s): 
Attending Provider: Rodo Kwon MD 
Physician Assistant: MITZI Lamar. Tresa Askew may return to work on 10/9/18. Sincerely, MITZI Lamar

## 2018-10-08 NOTE — DISCHARGE INSTRUCTIONS
Abscessed Tooth: Care Instructions  Your Care Instructions    An abscessed tooth is a tooth that has a pocket of pus in the tissues around it. Pus forms when the body tries to fight an infection caused by bacteria. If the pus cannot drain, it forms an abscess. An abscessed tooth can cause red, swollen gums and throbbing pain, especially when you chew. You may have a bad taste in your mouth and a fever, and your jaw may swell. Damage to the tooth, untreated tooth decay, or gum disease can cause an abscessed tooth. An abscessed tooth needs to be treated by a dental professional right away. If it is not treated, the infection could spread to other parts of your body. Your dentist will give you antibiotics to stop the infection. He or she may make a hole in the tooth or cut open (abigail) the abscess inside your mouth so that the infection can drain, which should relieve your pain. You may need to have a root canal treatment, which tries to save your tooth by taking out the infected pulp and replacing it with a healing medicine and/or a filling. If these treatments do not work, your tooth may have to be removed. Follow-up care is a key part of your treatment and safety. Be sure to make and go to all appointments, and call your doctor if you are having problems. It's also a good idea to know your test results and keep a list of the medicines you take. How can you care for yourself at home? · Reduce pain and swelling in your face and jaw by putting ice or a cold pack on the outside of your cheek for 10 to 20 minutes at a time. Put a thin cloth between the ice and your skin. · Take pain medicines exactly as directed. ¨ If the doctor gave you a prescription medicine for pain, take it as prescribed. ¨ If you are not taking a prescription pain medicine, ask your doctor if you can take an over-the-counter medicine. · Take your antibiotics as directed. Do not stop taking them just because you feel better.  You need to take the full course of antibiotics. To prevent tooth abscess  · Brush and floss every day, and have regular dental checkups. · Eat a healthy diet, and avoid sugary foods and drinks. · Do not smoke, use e-cigarettes with nicotine, or use spit tobacco. Tobacco and nicotine slow your ability to heal. Tobacco also increases your risk for gum disease and cancer of the mouth and throat. If you need help quitting, talk to your doctor about stop-smoking programs and medicines. These can increase your chances of quitting for good. When should you call for help? Call 911 anytime you think you may need emergency care. For example, call if:    · You have trouble breathing.    Call your doctor now or seek immediate medical care if:    · You have new or worse symptoms of infection, such as:  ¨ Increased pain, swelling, warmth, or redness. ¨ Red streaks leading from the area. ¨ Pus draining from the area. ¨ A fever.    Watch closely for changes in your health, and be sure to contact your doctor if:    · You do not get better as expected. Where can you learn more? Go to http://marilee-fahad.info/. Enter O819 in the search box to learn more about \"Abscessed Tooth: Care Instructions. \"  Current as of: March 28, 2018  Content Version: 11.8  © 3964-3651 [employer]. Care instructions adapted under license by [employer] (which disclaims liability or warranty for this information). If you have questions about a medical condition or this instruction, always ask your healthcare professional. [place] [address] any warranty or liability for your use of this information.

## 2018-10-08 NOTE — ED NOTES
Pt instructed to follow up with dentistry, to perform oral care regularly, and to return for worsening pain/facial swelling/other concerns

## 2018-10-08 NOTE — ED PROVIDER NOTES
[facility] EMERGENCY DEPT 
 
 
8:58 PM 
 
Date: 10/7/2018 Patient Name: Ezequiel Cunningham History of Presenting Illness Chief Complaint Patient presents with  Dental Pain  
 
46 y.o. female with noted past medical history who presents to the emergency department c/o a toothache for the past few days. She states her tooth broke a few months ago and for the past few days has been throbbing. She notes improvement of her pain with Aleve and topical gel. She called her dentist and will be able to get an appointment on Tuesday or Wednesday. She denies any fever, chills, difficulty breathing/swallowing, or other symptoms at this time. No other complaints. Nursing notes regarding the HPI and triage nursing notes were reviewed. Prior medical records were reviewed. Current Outpatient Prescriptions Medication Sig Dispense Refill  penicillin v potassium (VEETID) 500 mg tablet Take 1 Tab by mouth four (4) times daily for 7 days. 28 Tab 0  
 gabapentin (NEURONTIN) 100 mg capsule Take 1 Cap by mouth three (3) times daily. Indications: NEUROPATHIC PAIN 90 Cap 11  
 lisinopril (PRINIVIL, ZESTRIL) 10 mg tablet Take 1 Tab by mouth daily. Indications: hypertension 30 Tab 11  
 metFORMIN (GLUCOPHAGE) 1,000 mg tablet Take 1 Tab by mouth two (2) times daily (with meals). Indications: type 2 diabetes mellitus 60 Tab 11  
 insulin detemir (LEVEMIR) 100 unit/mL injection 40 Units by SubCUTAneous route two (2) times a day. (Patient taking differently: by SubCUTAneous route. 40 units in am, 20 units in pm  Indications: type 2 diabetes mellitus) 2 Vial 11  
 levothyroxine (SYNTHROID) 100 mcg tablet Take 1 Tab by mouth Daily (before breakfast).  Indications: hypothyroidism 90 Tab 0  
 SUMAtriptan (IMITREX) 50 mg tablet Take 1 at onset of migraine - may repeat 1 tab by mouth in 2 hrs if needed 10 Tab 11  
 fluticasone-salmeterol (ADVAIR) 250-50 mcg/dose diskus inhaler Take 1 Puff by inhalation daily. (Patient taking differently: Take 1 Puff by inhalation daily as needed.) 1 Inhaler 3  
 cetirizine (ZYRTEC) 10 mg tablet Take 1 Tab by mouth daily. 90 Tab 3  
 albuterol (PROVENTIL HFA, VENTOLIN HFA, PROAIR HFA) 90 mcg/actuation inhaler Take 2 puffs by inhalation every four (4) hours as needed for Wheezing. 1 Inhaler 0  
 Insulin Needles, Disposable, (PEN NEEDLE, DIABETIC) 31 gauge x 1/4\" ndle Use twice daily for insulin injections 90 Pen Needle 2  Blood-Glucose Meter monitoring kit Test twice daily 1 Kit 0  
 glucose blood VI test strips (BLOOD GLUCOSE TEST) strip Use twice daily as directed, to match her glucometer 100 Strip 11 Past History Past Medical History: 
Past Medical History:  
Diagnosis Date  Asthma  Bronchitis  Diabetes ([address]  Endocrine disease   
 hyperthyroidism  Endometriosis  Hypertension Past Surgical History: 
Past Surgical History:  
Procedure Laterality Date  HX  SECTION    
 x 3  
 HX GYN    
 partial hysterectomy  HX GYN    
 cervical laparoscopy; endometriosis  HX HYSTERECTOMY Family History: 
Family History Problem Relation Age of Onset  Cancer Mother  Diabetes Mother  Dementia Mother  Diabetes Father Social History: 
Social History Substance Use Topics  Smoking status: Never Smoker  Smokeless tobacco: Never Used  Alcohol use 0.0 oz/week  
  0 Standard drinks or equivalent per week Comment: occasional  
 
 
Allergies: Allergies Allergen Reactions  Vicodin [Hydrocodone-Acetaminophen] Shortness of Breath Patient's primary care provider (as noted in EPIC): Benoit Tamez MD 
 
Review of Systems Constitutional:  Denies malaise, fever, chills. ENMT:  + dental pain Neck:  Denies injury or pain. Chest:  Denies injury. Extremity/MS:  Denies injury or pain.   
Neuro:  Denies headache, LOC, dizziness, neurologic symptoms/deficits/paresthesias. Skin: Denies injury, rash, itching or skin changes. All other systems negative as reviewed. Visit Vitals  BP (!) 160/105  Pulse 90  Temp 98.9 °F (37.2 °C)  Resp 16  
 Ht 5' 6\" (1.676 m)  Wt 67.1 kg (148 lb)  SpO2 98%  BMI 23.89 kg/m2 PHYSICAL EXAM: 
 
CONSTITUTIONAL:  Alert, in no apparent distress;  well developed;  well nourished. HEAD:  Normocephalic, atraumatic. EYES:  EOMI. Non-icteric sclera. Normal conjunctiva. ENTM:  Mouth: adjacent to right anterolateral inferior teeth with 1cm fluctuant mass with central pustule noted; airway patent, uvula midline. Nose:  no rhinorrhea. Throat:  no erythema or exudate, mucous membranes moist. 
NECK:  Supple RESPIRATORY:  Chest clear, equal breath sounds, good air movement. Without wheezes, rhonchi or rales. CARDIOVASCULAR:  Regular rate and rhythm. No murmurs, rubs, or gallops. NEURO:  Moves all four extremities, and grossly normal motor exam. 
SKIN:  No rashes;  Normal for age. PSYCH:  Alert and normal affect. Oropharynx/ teeth:  SUPERIOR/ADJACENT to tooth # 28-30 there is noted fluctuance and tenderness to palpation, consistent with probable abscess. Oropharynx is otherwise normal and symmetric. PROCEDURE NOTE: NEEDLE ASPIRATION  
18 gauge needle inserted into the site. 0.5cc's of blood extracted. Local anesthetic:  None The patient tolerated the procedure well. IMPRESSION AND MEDICAL DECISION MAKING: 
Based upon the patient's presentation with noted HPI and PE, along with the work up done in the emergency department, I believe that the patient is having noted abscess. Will write for penicillin and have her f/u with her dentist in the next few days. Diagnosis: 1. Dental abscess Disposition: Discharge Follow-up Information Follow up With Details Comments Contact Info Ottoniel Campuzano In 3 days  [address] [address] [phone] [facility] EMERGENCY DEPT  If symptoms worsen [address] [id number] [phone] Patient's Medications Start Taking PENICILLIN V POTASSIUM (VEETID) 500 MG TABLET    Take 1 Tab by mouth four (4) times daily for 7 days. Continue Taking ALBUTEROL (PROVENTIL HFA, VENTOLIN HFA, PROAIR HFA) 90 MCG/ACTUATION INHALER    Take 2 puffs by inhalation every four (4) hours as needed for Wheezing. BLOOD-GLUCOSE METER MONITORING KIT    Test twice daily CETIRIZINE (ZYRTEC) 10 MG TABLET    Take 1 Tab by mouth daily. FLUTICASONE-SALMETEROL (ADVAIR) 250-50 MCG/DOSE DISKUS INHALER    Take 1 Puff by inhalation daily. GABAPENTIN (NEURONTIN) 100 MG CAPSULE    Take 1 Cap by mouth three (3) times daily. Indications: NEUROPATHIC PAIN  
 GLUCOSE BLOOD VI TEST STRIPS (BLOOD GLUCOSE TEST) STRIP    Use twice daily as directed, to match her glucometer INSULIN DETEMIR (LEVEMIR) 100 UNIT/ML INJECTION    40 Units by SubCUTAneous route two (2) times a day. INSULIN NEEDLES, DISPOSABLE, (PEN NEEDLE, DIABETIC) 31 GAUGE X 1/4\" NDLE    Use twice daily for insulin injections LEVOTHYROXINE (SYNTHROID) 100 MCG TABLET    Take 1 Tab by mouth Daily (before breakfast). Indications: hypothyroidism LISINOPRIL (PRINIVIL, ZESTRIL) 10 MG TABLET    Take 1 Tab by mouth daily. Indications: hypertension METFORMIN (GLUCOPHAGE) 1,000 MG TABLET    Take 1 Tab by mouth two (2) times daily (with meals). Indications: type 2 diabetes mellitus SUMATRIPTAN (IMITREX) 50 MG TABLET    Take 1 at onset of migraine - may repeat 1 tab by mouth in 2 hrs if needed These Medications have changed No medications on file Stop Taking NORTRIPTYLINE (PAMELOR) 25 MG CAPSULE    Take 2 Caps by mouth nightly. ONDANSETRON (ZOFRAN ODT) 8 MG DISINTEGRATING TABLET    Take 1 Tab by mouth every eight (8) hours as needed for Nausea.   
 
MITZI Casper

## 2019-03-28 ENCOUNTER — HOSPITAL ENCOUNTER (EMERGENCY)
Age: 49
Discharge: HOME | End: 2019-03-28
Attending: EMERGENCY MEDICINE | Admitting: EMERGENCY MEDICINE
Payer: COMMERCIAL

## 2019-03-28 ENCOUNTER — APPOINTMENT (OUTPATIENT)
Dept: GENERAL RADIOLOGY | Age: 49
End: 2019-03-28
Attending: EMERGENCY MEDICINE
Payer: COMMERCIAL

## 2019-03-28 VITALS
HEIGHT: 66 IN | DIASTOLIC BLOOD PRESSURE: 84 MMHG | TEMPERATURE: 98.5 F | SYSTOLIC BLOOD PRESSURE: 146 MMHG | OXYGEN SATURATION: 100 % | RESPIRATION RATE: 18 BRPM | HEART RATE: 81 BPM | BODY MASS INDEX: 30.05 KG/M2 | WEIGHT: 187 LBS

## 2019-03-28 DIAGNOSIS — J06.9 ACUTE UPPER RESPIRATORY INFECTION: ICD-10-CM

## 2019-03-28 DIAGNOSIS — R19.7 DIARRHEA, UNSPECIFIED TYPE: Primary | ICD-10-CM

## 2019-03-28 DIAGNOSIS — R73.9 HYPERGLYCEMIA: ICD-10-CM

## 2019-03-28 LAB
ADMINISTERED INITIALS, ADMINIT: NORMAL
ADMINISTERED INITIALS, ADMINIT: NORMAL
ALBUMIN SERPL-MCNC: 3.7 G/DL (ref 3.4–5)
ALBUMIN/GLOB SERPL: 0.9 {RATIO} (ref 0.8–1.7)
ALP SERPL-CCNC: 125 U/L (ref 45–117)
ALT SERPL-CCNC: 19 U/L (ref 13–56)
ANION GAP SERPL CALC-SCNC: 6 MMOL/L (ref 3–18)
ANION GAP SERPL CALC-SCNC: 6 MMOL/L (ref 3–18)
AST SERPL-CCNC: 22 U/L (ref 15–37)
BASOPHILS # BLD: 0 K/UL (ref 0–0.1)
BASOPHILS NFR BLD: 0 % (ref 0–2)
BILIRUB SERPL-MCNC: 0.7 MG/DL (ref 0.2–1)
BUN SERPL-MCNC: 14 MG/DL (ref 7–18)
BUN SERPL-MCNC: 14 MG/DL (ref 7–18)
BUN/CREAT SERPL: 12 (ref 12–20)
BUN/CREAT SERPL: 13 (ref 12–20)
CALCIUM SERPL-MCNC: 10.1 MG/DL (ref 8.5–10.1)
CALCIUM SERPL-MCNC: 9.7 MG/DL (ref 8.5–10.1)
CHLORIDE SERPL-SCNC: 95 MMOL/L (ref 100–108)
CHLORIDE SERPL-SCNC: 97 MMOL/L (ref 100–108)
CO2 SERPL-SCNC: 31 MMOL/L (ref 21–32)
CO2 SERPL-SCNC: 31 MMOL/L (ref 21–32)
CREAT SERPL-MCNC: 1.09 MG/DL (ref 0.6–1.3)
CREAT SERPL-MCNC: 1.15 MG/DL (ref 0.6–1.3)
D50 ADMINISTERED, D50ADM: 0 ML
D50 ADMINISTERED, D50ADM: 0 ML
D50 ORDER, D50ORD: 0 ML
D50 ORDER, D50ORD: 0 ML
DIFFERENTIAL METHOD BLD: ABNORMAL
EOSINOPHIL # BLD: 0.1 K/UL (ref 0–0.4)
EOSINOPHIL NFR BLD: 1 % (ref 0–5)
ERYTHROCYTE [DISTWIDTH] IN BLOOD BY AUTOMATED COUNT: 12.1 % (ref 11.6–14.5)
EST. AVERAGE GLUCOSE BLD GHB EST-MCNC: 338 MG/DL
GLOBULIN SER CALC-MCNC: 4.3 G/DL (ref 2–4)
GLUCOSE BLD STRIP.AUTO-MCNC: 236 MG/DL (ref 70–110)
GLUCOSE BLD STRIP.AUTO-MCNC: 378 MG/DL (ref 70–110)
GLUCOSE BLD STRIP.AUTO-MCNC: >600 MG/DL (ref 70–110)
GLUCOSE SERPL-MCNC: 590 MG/DL (ref 74–99)
GLUCOSE SERPL-MCNC: 636 MG/DL (ref 74–99)
GLUCOSE, GLC: 378 MG/DL
GLUCOSE, GLC: 600 MG/DL
HBA1C MFR BLD: 13.4 % (ref 4.2–5.6)
HCT VFR BLD AUTO: 43.3 % (ref 35–45)
HGB BLD-MCNC: 14.2 G/DL (ref 12–16)
HIGH TARGET, HITG: 180 MG/DL
HIGH TARGET, HITG: 180 MG/DL
INSULIN ADMINSTERED, INSADM: 10.8 UNITS/HOUR
INSULIN ADMINSTERED, INSADM: 3.2 UNITS/HOUR
INSULIN ORDER, INSORD: 10.8 UNITS/HOUR
INSULIN ORDER, INSORD: 3.2 UNITS/HOUR
LOW TARGET, LOT: 140 MG/DL
LOW TARGET, LOT: 140 MG/DL
LYMPHOCYTES # BLD: 2.4 K/UL (ref 0.9–3.6)
LYMPHOCYTES NFR BLD: 41 % (ref 21–52)
MAGNESIUM SERPL-MCNC: 2 MG/DL (ref 1.6–2.6)
MCH RBC QN AUTO: 25.5 PG (ref 24–34)
MCHC RBC AUTO-ENTMCNC: 32.8 G/DL (ref 31–37)
MCV RBC AUTO: 77.9 FL (ref 74–97)
MINUTES UNTIL NEXT BG, NBG: 60 MIN
MINUTES UNTIL NEXT BG, NBG: 60 MIN
MONOCYTES # BLD: 0 K/UL (ref 0.05–1.2)
MONOCYTES NFR BLD: 0 % (ref 3–10)
MULTIPLIER, MUL: 0.01
MULTIPLIER, MUL: 0.02
NEUTS SEG # BLD: 3.4 K/UL (ref 1.8–8)
NEUTS SEG NFR BLD: 58 % (ref 40–73)
ORDER INITIALS, ORDINIT: NORMAL
ORDER INITIALS, ORDINIT: NORMAL
PHOSPHATE SERPL-MCNC: 3.8 MG/DL (ref 2.5–4.9)
PLATELET # BLD AUTO: 249 K/UL (ref 135–420)
PMV BLD AUTO: 11.3 FL (ref 9.2–11.8)
POTASSIUM SERPL-SCNC: 4.3 MMOL/L (ref 3.5–5.5)
POTASSIUM SERPL-SCNC: 4.4 MMOL/L (ref 3.5–5.5)
PROT SERPL-MCNC: 8 G/DL (ref 6.4–8.2)
RBC # BLD AUTO: 5.56 M/UL (ref 4.2–5.3)
SODIUM SERPL-SCNC: 132 MMOL/L (ref 136–145)
SODIUM SERPL-SCNC: 134 MMOL/L (ref 136–145)
WBC # BLD AUTO: 6 K/UL (ref 4.6–13.2)

## 2019-03-28 PROCEDURE — 83036 HEMOGLOBIN GLYCOSYLATED A1C: CPT

## 2019-03-28 PROCEDURE — 71045 X-RAY EXAM CHEST 1 VIEW: CPT

## 2019-03-28 PROCEDURE — 74011250636 HC RX REV CODE- 250/636: Performed by: EMERGENCY MEDICINE

## 2019-03-28 PROCEDURE — 99284 EMERGENCY DEPT VISIT MOD MDM: CPT

## 2019-03-28 PROCEDURE — 80053 COMPREHEN METABOLIC PANEL: CPT

## 2019-03-28 PROCEDURE — 74011636637 HC RX REV CODE- 636/637: Performed by: EMERGENCY MEDICINE

## 2019-03-28 PROCEDURE — 96365 THER/PROPH/DIAG IV INF INIT: CPT

## 2019-03-28 PROCEDURE — 96366 THER/PROPH/DIAG IV INF ADDON: CPT

## 2019-03-28 PROCEDURE — 83735 ASSAY OF MAGNESIUM: CPT

## 2019-03-28 PROCEDURE — 74011000258 HC RX REV CODE- 258: Performed by: EMERGENCY MEDICINE

## 2019-03-28 PROCEDURE — 85025 COMPLETE CBC W/AUTO DIFF WBC: CPT

## 2019-03-28 PROCEDURE — 96368 THER/DIAG CONCURRENT INF: CPT

## 2019-03-28 PROCEDURE — 82962 GLUCOSE BLOOD TEST: CPT

## 2019-03-28 PROCEDURE — 84100 ASSAY OF PHOSPHORUS: CPT

## 2019-03-28 RX ORDER — MAGNESIUM SULFATE 100 %
4 CRYSTALS MISCELLANEOUS AS NEEDED
Status: DISCONTINUED | OUTPATIENT
Start: 2019-03-28 | End: 2019-03-28 | Stop reason: HOSPADM

## 2019-03-28 RX ORDER — DEXTROSE 50 % IN WATER (D50W) INTRAVENOUS SYRINGE
25-50 AS NEEDED
Status: DISCONTINUED | OUTPATIENT
Start: 2019-03-28 | End: 2019-03-28 | Stop reason: HOSPADM

## 2019-03-28 RX ADMIN — SODIUM CHLORIDE 1000 ML: 900 INJECTION, SOLUTION INTRAVENOUS at 18:00

## 2019-03-28 RX ADMIN — SODIUM CHLORIDE 1000 ML: 900 INJECTION, SOLUTION INTRAVENOUS at 17:59

## 2019-03-28 RX ADMIN — SODIUM CHLORIDE 10.8 UNITS/HR: 900 INJECTION, SOLUTION INTRAVENOUS at 17:35

## 2019-03-28 NOTE — ED TRIAGE NOTES
\"for the past 2 weeks I've had cold symptom, this week its gotten worst, I have a cough, fever, chills, body aches, chest pain, and back pain. \"

## 2019-03-28 NOTE — DISCHARGE INSTRUCTIONS
Patient Education        Diarrhea: Care Instructions  Your Care Instructions    Diarrhea is loose, watery stools (bowel movements). The exact cause is often hard to find. Sometimes diarrhea is your body's way of getting rid of what caused an upset stomach. Viruses, food poisoning, and many medicines can cause diarrhea. Some people get diarrhea in response to emotional stress, anxiety, or certain foods. Almost everyone has diarrhea now and then. It usually isn't serious, and your stools will return to normal soon. The important thing to do is replace the fluids you have lost, so you can prevent dehydration. The doctor has checked you carefully, but problems can develop later. If you notice any problems or new symptoms, get medical treatment right away. Follow-up care is a key part of your treatment and safety. Be sure to make and go to all appointments, and call your doctor if you are having problems. It's also a good idea to know your test results and keep a list of the medicines you take. How can you care for yourself at home? · Watch for signs of dehydration, which means your body has lost too much water. Dehydration is a serious condition and should be treated right away. Signs of dehydration are:  ? Increasing thirst and dry eyes and mouth. ? Feeling faint or lightheaded. ? Darker urine, and a smaller amount of urine than normal.  · To prevent dehydration, drink plenty of fluids, enough so that your urine is light yellow or clear like water. Choose water and other caffeine-free clear liquids until you feel better. If you have kidney, heart, or liver disease and have to limit fluids, talk with your doctor before you increase the amount of fluids you drink. · Begin eating small amounts of mild foods the next day, if you feel like it. ? Try yogurt that has live cultures of Lactobacillus. (Check the label.)  ?  Avoid spicy foods, fruits, alcohol, and caffeine until 48 hours after all symptoms are gone.  ? Avoid chewing gum that contains sorbitol. ? Avoid dairy products (except for yogurt with Lactobacillus) while you have diarrhea and for 3 days after symptoms are gone. · The doctor may recommend that you take over-the-counter medicine, such as loperamide (Imodium), if you still have diarrhea after 6 hours. Read and follow all instructions on the label. Do not use this medicine if you have bloody diarrhea, a high fever, or other signs of serious illness. Call your doctor if you think you are having a problem with your medicine. When should you call for help? Call 911 anytime you think you may need emergency care. For example, call if:    · You passed out (lost consciousness).     · Your stools are maroon or very bloody.    Call your doctor now or seek immediate medical care if:    · You are dizzy or lightheaded, or you feel like you may faint.     · Your stools are black and look like tar, or they have streaks of blood.     · You have new or worse belly pain.     · You have symptoms of dehydration, such as:  ? Dry eyes and a dry mouth. ? Passing only a little dark urine. ? Feeling thirstier than usual.     · You have a new or higher fever.    Watch closely for changes in your health, and be sure to contact your doctor if:    · Your diarrhea is getting worse.     · You see pus in the diarrhea.     · You are not getting better after 2 days (48 hours). Where can you learn more? Go to http://marilee-fahad.info/. Enter C189 in the search box to learn more about \"Diarrhea: Care Instructions. \"  Current as of: September 23, 2018  Content Version: 11.9  © 1056-1695 [employer]. Care instructions adapted under license by [employer] (which disclaims liability or warranty for this information).  If you have questions about a medical condition or this instruction, always ask your healthcare professional. [place] [address] any warranty or liability for your use of this information. Patient Education        Learning About High Blood Sugar  What is high blood sugar? Your body turns the food you eat into glucose (sugar), which it uses for energy. But if your body isn't able to use the sugar right away, it can build up in your blood and lead to high blood sugar. When the amount of sugar in your blood stays too high for too much of the time, you may have diabetes. Diabetes is a disease that can cause serious health problems. The good news is that lifestyle changes may help you get your blood sugar back to normal and avoid or delay diabetes. What causes high blood sugar? Sugar (glucose) can build up in your blood if you:  · Are overweight. · Have a family history of diabetes. · Take certain medicines, such as steroids. What are the symptoms? Having high blood sugar may not cause any symptoms at all. Or it may make you feel very thirsty or very hungry. You may also urinate more often than usual, have blurry vision, or lose weight without trying. How is high blood sugar treated? You can take steps to lower your blood sugar level if you understand what makes it get higher. Your doctor may want you to learn how to test your blood sugar level at home. Then you can see how illness, stress, or different kinds of food or medicine raise or lower your blood sugar level. Other tests may be needed to see if you have diabetes. How can you prevent high blood sugar? · Watch your weight. If you're overweight, losing just a small amount of weight may help. Reducing fat around your waist is most important. · Limit the amount of calories, sweets, and unhealthy fat you eat. Ask your doctor if a dietitian can help you. A registered dietitian can help you create meal plans that fit your lifestyle. · Get at least 30 minutes of exercise on most days of the week. Exercise helps control your blood sugar. It also helps you maintain a healthy weight.  Walking is a good choice. You also may want to do other activities, such as running, swimming, cycling, or playing tennis or team sports. · If your doctor prescribed medicines, take them exactly as prescribed. Call your doctor if you think you are having a problem with your medicine. You will get more details on the specific medicines your doctor prescribes. Follow-up care is a key part of your treatment and safety. Be sure to make and go to all appointments, and call your doctor if you are having problems. It's also a good idea to know your test results and keep a list of the medicines you take. Where can you learn more? Go to http://marilee-fahad.info/. Enter O108 in the search box to learn more about \"Learning About High Blood Sugar. \"  Current as of: July 25, 2018  Content Version: 11.9  © 3031-3135 [employer]. Care instructions adapted under license by [employer] (which disclaims liability or warranty for this information). If you have questions about a medical condition or this instruction, always ask your healthcare professional. [place] [address] any warranty or liability for your use of this information. Patient Education        Viral Respiratory Infection: Care Instructions  Your Care Instructions    Viruses are very small organisms. They grow in number after they enter your body. There are many types that cause different illnesses, such as colds and the mumps. The symptoms of a viral respiratory infection often start quickly. They include a fever, sore throat, and runny nose. You may also just not feel well. Or you may not want to eat much. Most viral respiratory infections are not serious. They usually get better with time and self-care. Antibiotics are not used to treat a viral infection. That's because antibiotics will not help cure a viral illness. In some cases, antiviral medicine can help your body fight a serious viral infection.   Follow-up care is a key part of your treatment and safety. Be sure to make and go to all appointments, and call your doctor if you are having problems. It's also a good idea to know your test results and keep a list of the medicines you take. How can you care for yourself at home? · Rest as much as possible until you feel better. · Be safe with medicines. Take your medicine exactly as prescribed. Call your doctor if you think you are having a problem with your medicine. You will get more details on the specific medicine your doctor prescribes. · Take an over-the-counter pain medicine, such as acetaminophen (Tylenol), ibuprofen (Advil, Motrin), or naproxen (Aleve), as needed for pain and fever. Read and follow all instructions on the label. Do not give aspirin to anyone younger than 20. It has been linked to Reye syndrome, a serious illness. · Drink plenty of fluids, enough so that your urine is light yellow or clear like water. Hot fluids, such as tea or soup, may help relieve congestion in your nose and throat. If you have kidney, heart, or liver disease and have to limit fluids, talk with your doctor before you increase the amount of fluids you drink. · Try to clear mucus from your lungs by breathing deeply and coughing. · Gargle with warm salt water once an hour. This can help reduce swelling and throat pain. Use 1 teaspoon of salt mixed in 1 cup of warm water. · Do not smoke or allow others to smoke around you. If you need help quitting, talk to your doctor about stop-smoking programs and medicines. These can increase your chances of quitting for good. To avoid spreading the virus  · Cough or sneeze into a tissue. Then throw the tissue away. · If you don't have a tissue, use your hand to cover your cough or sneeze. Then clean your hand. You can also cough into your sleeve. · Wash your hands often. Use soap and warm water. Wash for 15 to 20 seconds each time.   · If you don't have soap and water near you, you can clean your hands with alcohol wipes or gel. When should you call for help? Call your doctor now or seek immediate medical care if:    · You have a new or higher fever.     · Your fever lasts more than 48 hours.     · You have trouble breathing.     · You have a fever with a stiff neck or a severe headache.     · You are sensitive to light.     · You feel very sleepy or confused.    Watch closely for changes in your health, and be sure to contact your doctor if:    · You do not get better as expected. Where can you learn more? Go to http://marilee-fahad.info/. Enter M036 in the search box to learn more about \"Viral Respiratory Infection: Care Instructions. \"  Current as of: September 5, 2018  Content Version: 11.9  © 3096-8112 [employer]. Care instructions adapted under license by [employer] (which disclaims liability or warranty for this information). If you have questions about a medical condition or this instruction, always ask your healthcare professional. [address] any warranty or liability for your use of this information.

## 2019-03-28 NOTE — ED NOTES
I have reviewed discharge instructions with the patient. The patient verbalized understanding. Pt is AxOx4, NAD. Pt received written discharge instructions. Pt ambulated out of ED with steady gait, accompanied by family member.

## 2019-03-28 NOTE — ED PROVIDER NOTES
EMERGENCY DEPARTMENT HISTORY AND PHYSICAL EXAM 
 
3:40 PM 
Date: 3/28/2019 Patient Name: Chalino Dacosta History of Presenting Illness Chief Complaint: 
 
History Provided By:  
 
HPI: Chalino Dacosta is a 44 y.o. female with a past medical history of asthma bronchitis diabetes hypothyroidism and hypertension presents with respiratory symptoms for 2 weeks. She states initially she had cough sore throat and runny nose this is progressed now she for the past 3 days has had fever body aches chills vomiting diarrhea. PCP: Sage Mustafa MD 
 
This was created with voice recognition software and transcription errors may be present. Past History Past Medical History: 
Past Medical History:  
Diagnosis Date  Asthma  Bronchitis  Diabetes ([address]  Endocrine disease   
 hyperthyroidism  Endometriosis  Hypertension Past Surgical History: 
Past Surgical History:  
Procedure Laterality Date  HX  SECTION    
 x 3  
 HX GYN    
 partial hysterectomy  HX GYN    
 cervical laparoscopy; endometriosis  HX HYSTERECTOMY Family History: 
Family History Problem Relation Age of Onset  Cancer Mother  Diabetes Mother  Dementia Mother  Diabetes Father Social History: 
Social History Tobacco Use  Smoking status: Never Smoker  Smokeless tobacco: Never Used Substance Use Topics  Alcohol use: Yes Alcohol/week: 0.0 oz  
  Comment: occasional  
 Drug use: No  
 
 
Allergies: Allergies Allergen Reactions  Vicodin [Hydrocodone-Acetaminophen] Shortness of Breath Review of Systems Review of Systems Constitutional: Positive for fever. Respiratory: Negative for chest tightness and shortness of breath. Gastrointestinal: Positive for diarrhea and vomiting. All other systems reviewed and are negative. Physical Exam  
 
 
Physical Exam  
Constitutional: She is oriented to person, place, and time.  She appears well-developed. HENT:  
Head: Normocephalic and atraumatic. Eyes: Pupils are equal, round, and reactive to light. EOM are normal.  
Neck: Normal range of motion. Neck supple. Cardiovascular: Normal rate, regular rhythm and normal heart sounds. Exam reveals no friction rub. No murmur heard. Pulmonary/Chest: Effort normal and breath sounds normal. No respiratory distress. She has no wheezes. Abdominal: Soft. She exhibits no distension. There is no tenderness. There is no rebound and no guarding. Musculoskeletal: Normal range of motion. Neurological: She is alert and oriented to person, place, and time. Skin: Skin is warm and dry. Psychiatric: She has a normal mood and affect. Her behavior is normal. Thought content normal.  
 
 
Diagnostic Study Results Vital Signs Visit Vitals /84 (BP 1 Location: Left arm, BP Patient Position: At rest) Pulse (!) 103 Temp 98.5 °F (36.9 °C) Resp 16 Wt 84.8 kg (187 lb) SpO2 100% BMI 30.18 kg/m² EKG: 
 
Labs: reviewed; hyperglycemia; no dka Cbc wnl no shift no bands. Imaging: xr neg Medical Decision Making ED Course: Progress Notes, Reevaluation, and Consults: 
 
 
Provider Notes (Medical Decision Making): 57-year-old female presents with 2 weeks of illness. Initially sounds like an upper respiratory infection that may have evolved into a gastroenteritis picture will check basic labs  as well as a chest x-ray 
 
 xr neg; likley viral with hyperglycemia; pt is a diabetc; managed outpt. Will once glucose <400. Diagnosis Clinical Impression: No diagnosis found. Disposition: 
 
Patient's Medications Start Taking No medications on file Continue Taking ALBUTEROL (PROVENTIL HFA, VENTOLIN HFA, PROAIR HFA) 90 MCG/ACTUATION INHALER    Take 2 puffs by inhalation every four (4) hours as needed for Wheezing. BLOOD-GLUCOSE METER MONITORING KIT    Test twice daily CETIRIZINE (ZYRTEC) 10 MG TABLET    Take 1 Tab by mouth daily. FLUTICASONE-SALMETEROL (ADVAIR) 250-50 MCG/DOSE DISKUS INHALER    Take 1 Puff by inhalation daily. GABAPENTIN (NEURONTIN) 100 MG CAPSULE    Take 1 Cap by mouth three (3) times daily. Indications: NEUROPATHIC PAIN  
 GLUCOSE BLOOD VI TEST STRIPS (BLOOD GLUCOSE TEST) STRIP    Use twice daily as directed, to match her glucometer INSULIN DETEMIR (LEVEMIR) 100 UNIT/ML INJECTION    40 Units by SubCUTAneous route two (2) times a day. INSULIN NEEDLES, DISPOSABLE, (PEN NEEDLE, DIABETIC) 31 GAUGE X 1/4\" NDLE    Use twice daily for insulin injections LEVOTHYROXINE (SYNTHROID) 100 MCG TABLET    TAKE 1 TABLET BY MOUTH DAILY BEFORE BREAKFAST FOR HYPOTHYROIDISM  
 LISINOPRIL (PRINIVIL, ZESTRIL) 10 MG TABLET    Take 1 Tab by mouth daily. Indications: hypertension METFORMIN (GLUCOPHAGE) 1,000 MG TABLET    Take 1 Tab by mouth two (2) times daily (with meals). Indications: type 2 diabetes mellitus SUMATRIPTAN (IMITREX) 50 MG TABLET    Take 1 at onset of migraine - may repeat 1 tab by mouth in 2 hrs if needed These Medications have changed No medications on file Stop Taking No medications on file  
 
_______________________________ Attestations: 
Scribe Attestation Sary Ridley MD acting as a scribe for and in the presence of Jameson Rubio MD     
March 28, 2019 at 3:40 PM 
    
Provider Attestation:     
I personally performed the services described in the documentation, reviewed the documentation, as recorded by the scribe in my presence, and it accurately and completely records my words and actions. March 28, 2019 at 3:40 PM - Jameson Rubio MD   
_______________________________

## 2019-06-20 ENCOUNTER — DOCUMENTATION ONLY (OUTPATIENT)
Dept: FAMILY MEDICINE CLINIC | Facility: CLINIC | Age: 49
End: 2019-06-20

## 2019-06-20 NOTE — PROGRESS NOTES
Pharmacist Note: Immunization Update    Patient: Radha Hagen (69 y.o., 1970)     Patient's immunization history was updated to reflect information contained in the [place] and/or outside immunization/pharmacy records were reconciled within [facility]. Health Maintenance schedule updated when appropriate.     Current immunizations now reflect:       Immunization History   Administered Date(s) Administered    Influenza Vaccine 01/01/2019    Influenza Vaccine (Quad) 10/15/2015    Tdap 12/05/2014       Beatriz Lomeli, PharmD, BCACP

## 2019-10-15 ENCOUNTER — APPOINTMENT (OUTPATIENT)
Dept: GENERAL RADIOLOGY | Age: 49
End: 2019-10-15
Attending: EMERGENCY MEDICINE
Payer: COMMERCIAL

## 2019-10-15 ENCOUNTER — HOSPITAL ENCOUNTER (EMERGENCY)
Age: 49
Discharge: HOME | End: 2019-10-15
Attending: EMERGENCY MEDICINE
Payer: COMMERCIAL

## 2019-10-15 VITALS
OXYGEN SATURATION: 98 % | DIASTOLIC BLOOD PRESSURE: 81 MMHG | HEART RATE: 103 BPM | SYSTOLIC BLOOD PRESSURE: 139 MMHG | TEMPERATURE: 98.2 F | RESPIRATION RATE: 18 BRPM

## 2019-10-15 DIAGNOSIS — Z79.4 TYPE 2 DIABETES MELLITUS WITH HYPERGLYCEMIA, WITH LONG-TERM CURRENT USE OF INSULIN (HCC): Primary | ICD-10-CM

## 2019-10-15 DIAGNOSIS — R42 LIGHT HEADEDNESS: ICD-10-CM

## 2019-10-15 DIAGNOSIS — S93.401A SPRAIN OF RIGHT ANKLE, UNSPECIFIED LIGAMENT, INITIAL ENCOUNTER: ICD-10-CM

## 2019-10-15 DIAGNOSIS — E11.65 TYPE 2 DIABETES MELLITUS WITH HYPERGLYCEMIA, WITH LONG-TERM CURRENT USE OF INSULIN (HCC): Primary | ICD-10-CM

## 2019-10-15 LAB
ALBUMIN SERPL-MCNC: 3.3 G/DL (ref 3.4–5)
ALBUMIN/GLOB SERPL: 0.9 {RATIO} (ref 0.8–1.7)
ALP SERPL-CCNC: 109 U/L (ref 45–117)
ALT SERPL-CCNC: 17 U/L (ref 13–56)
ANION GAP SERPL CALC-SCNC: 4 MMOL/L (ref 3–18)
APPEARANCE UR: CLEAR
AST SERPL-CCNC: 20 U/L (ref 10–38)
BASOPHILS # BLD: 0 K/UL (ref 0–0.1)
BASOPHILS NFR BLD: 0 % (ref 0–2)
BILIRUB SERPL-MCNC: 0.7 MG/DL (ref 0.2–1)
BILIRUB UR QL: NEGATIVE
BUN SERPL-MCNC: 17 MG/DL (ref 7–18)
BUN/CREAT SERPL: 22 (ref 12–20)
CALCIUM SERPL-MCNC: 8.9 MG/DL (ref 8.5–10.1)
CHLORIDE SERPL-SCNC: 105 MMOL/L (ref 100–111)
CO2 SERPL-SCNC: 30 MMOL/L (ref 21–32)
COLOR UR: YELLOW
CREAT SERPL-MCNC: 0.79 MG/DL (ref 0.6–1.3)
DIFFERENTIAL METHOD BLD: ABNORMAL
EOSINOPHIL # BLD: 0.1 K/UL (ref 0–0.4)
EOSINOPHIL NFR BLD: 2 % (ref 0–5)
ERYTHROCYTE [DISTWIDTH] IN BLOOD BY AUTOMATED COUNT: 12.9 % (ref 11.6–14.5)
GLOBULIN SER CALC-MCNC: 3.7 G/DL (ref 2–4)
GLUCOSE BLD STRIP.AUTO-MCNC: 320 MG/DL (ref 70–110)
GLUCOSE BLD STRIP.AUTO-MCNC: 388 MG/DL (ref 70–110)
GLUCOSE SERPL-MCNC: 412 MG/DL (ref 74–99)
GLUCOSE UR STRIP.AUTO-MCNC: >1000 MG/DL
HCT VFR BLD AUTO: 36.9 % (ref 35–45)
HGB BLD-MCNC: 11.8 G/DL (ref 12–16)
HGB UR QL STRIP: NEGATIVE
KETONES UR QL STRIP.AUTO: NEGATIVE MG/DL
LEUKOCYTE ESTERASE UR QL STRIP.AUTO: NEGATIVE
LYMPHOCYTES # BLD: 2.4 K/UL (ref 0.9–3.6)
LYMPHOCYTES NFR BLD: 35 % (ref 21–52)
MCH RBC QN AUTO: 24.8 PG (ref 24–34)
MCHC RBC AUTO-ENTMCNC: 32 G/DL (ref 31–37)
MCV RBC AUTO: 77.7 FL (ref 74–97)
MONOCYTES # BLD: 0.6 K/UL (ref 0.05–1.2)
MONOCYTES NFR BLD: 9 % (ref 3–10)
NEUTS SEG # BLD: 3.6 K/UL (ref 1.8–8)
NEUTS SEG NFR BLD: 54 % (ref 40–73)
NITRITE UR QL STRIP.AUTO: NEGATIVE
PH UR STRIP: 7 [PH] (ref 5–8)
PLATELET # BLD AUTO: 210 K/UL (ref 135–420)
PMV BLD AUTO: 11.3 FL (ref 9.2–11.8)
POTASSIUM SERPL-SCNC: 3.7 MMOL/L (ref 3.5–5.5)
PROT SERPL-MCNC: 7 G/DL (ref 6.4–8.2)
PROT UR STRIP-MCNC: NEGATIVE MG/DL
RBC # BLD AUTO: 4.75 M/UL (ref 4.2–5.3)
SODIUM SERPL-SCNC: 139 MMOL/L (ref 136–145)
SP GR UR REFRACTOMETRY: >1.03 (ref 1–1.03)
UROBILINOGEN UR QL STRIP.AUTO: 1 EU/DL (ref 0.2–1)
WBC # BLD AUTO: 6.7 K/UL (ref 4.6–13.2)

## 2019-10-15 PROCEDURE — 73610 X-RAY EXAM OF ANKLE: CPT

## 2019-10-15 PROCEDURE — 81003 URINALYSIS AUTO W/O SCOPE: CPT

## 2019-10-15 PROCEDURE — 82962 GLUCOSE BLOOD TEST: CPT

## 2019-10-15 PROCEDURE — 80053 COMPREHEN METABOLIC PANEL: CPT

## 2019-10-15 PROCEDURE — 85025 COMPLETE CBC W/AUTO DIFF WBC: CPT

## 2019-10-15 PROCEDURE — 74011250636 HC RX REV CODE- 250/636: Performed by: EMERGENCY MEDICINE

## 2019-10-15 PROCEDURE — 93005 ELECTROCARDIOGRAM TRACING: CPT

## 2019-10-15 PROCEDURE — 99284 EMERGENCY DEPT VISIT MOD MDM: CPT

## 2019-10-15 RX ADMIN — SODIUM CHLORIDE 1000 ML: 900 INJECTION, SOLUTION INTRAVENOUS at 21:20

## 2019-10-15 RX ADMIN — SODIUM CHLORIDE 1000 ML: 900 INJECTION, SOLUTION INTRAVENOUS at 20:29

## 2019-10-15 NOTE — LETTER
NOTIFICATION RETURN TO WORK / SCHOOL 
 
10/15/2019 10:38 PM 
 
Ms. Kristina Casanova 
[address] [facility] [address] To Whom It May Concern: 
 
Kristina Casanova is currently under the care of [address] [facility] EMERGENCY DEPT. She will return to work/school on: 10/17/2019 If there are questions or concerns please have the patient contact our office. Sincerely, Steve Lau., RN

## 2019-10-15 NOTE — ED TRIAGE NOTES
Patient presents to ER for C/O lightheadedness and left side numbness after falling down stairs at 4:00am (16 hours ago).

## 2019-10-16 NOTE — ED PROVIDER NOTES
43 yo AAF with PMHx DM, HTN presents with 1-day h/o light-headedness. Pt states she was up this morning around 4am and felt light-headed and ended up having a fall down the last few stairs. Pt states she twisted her right ankle and also hit her head and back. Pt has had burning sensation to left lower back since that point. Pt has continued to feel light-headed most of the day and decided to come to ED for evaluation. No fevers, no vomiting. Past Medical History:   Diagnosis Date    Asthma     Bronchitis     Cataract     Diabetes ([address]     Endocrine disease     hyperthyroidism    Endometriosis     Hypertension     Irregular bleeding     Migraine     Pelvic pain     Thyroid disease        Past Surgical History:   Procedure Laterality Date    HX  SECTION      x 3    HX COLONOSCOPY      HX GYN      partial hysterectomy    HX GYN      cervical laparoscopy; endometriosis    HX HYSTERECTOMY           Family History:   Problem Relation Age of Onset    Cancer Mother     Diabetes Mother     Dementia Mother     Diabetes Father        Social History     Socioeconomic History    Marital status:      Spouse name: Not on file    Number of children: Not on file    Years of education: Not on file    Highest education level: Not on file   Occupational History    Not on file   Social Needs    Financial resource strain: Not on file    Food insecurity:     Worry: Not on file     Inability: Not on file    Transportation needs:     Medical: Not on file     Non-medical: Not on file   Tobacco Use    Smoking status: Never Smoker    Smokeless tobacco: Never Used   Substance and Sexual Activity    Alcohol use:  Yes     Alcohol/week: 0.0 standard drinks     Comment: occasional    Drug use: No    Sexual activity: Yes     Birth control/protection: None   Lifestyle    Physical activity:     Days per week: Not on file     Minutes per session: Not on file    Stress: Not on file Relationships    Social connections:     Talks on phone: Not on file     Gets together: Not on file     Attends Spiritism service: Not on file     Active member of club or organization: Not on file     Attends meetings of clubs or organizations: Not on file     Relationship status: Not on file    Intimate partner violence:     Fear of current or ex partner: Not on file     Emotionally abused: Not on file     Physically abused: Not on file     Forced sexual activity: Not on file   Other Topics Concern    Not on file   Social History Narrative    Not on file         ALLERGIES: Vicodin [hydrocodone-acetaminophen]    Review of Systems   Constitutional: Negative for fever. HENT: Negative for trouble swallowing. Respiratory: Negative for shortness of breath. Cardiovascular: Negative for chest pain. Gastrointestinal: Positive for nausea. Negative for abdominal pain, diarrhea and vomiting. Genitourinary: Negative for difficulty urinating. Musculoskeletal: Positive for back pain. Negative for neck pain. Skin: Negative for wound. Neurological: Positive for light-headedness. Negative for syncope. Psychiatric/Behavioral: Negative for behavioral problems. All other systems reviewed and are negative. Vitals:    10/15/19 2000   BP: 139/81   Pulse: (!) 103   Resp: 18   Temp: 98.2 °F (36.8 °C)   SpO2: 98%            Physical Exam   Constitutional: She is oriented to person, place, and time. She appears well-developed. No distress. well-appearing, nad   HENT:   Head: Normocephalic and atraumatic. Eyes: EOM are normal.   Neck: Normal range of motion. Cardiovascular: Normal rate and intact distal pulses. Pulmonary/Chest: Effort normal and breath sounds normal. No respiratory distress. Abdominal: Soft. There is no tenderness. Musculoskeletal: Normal range of motion. right ankle pain with rom, otherwise mechanically stable.    Neurological: She is alert and oriented to person, place, and time. No cranial nerve deficit. Coordination normal.   No focal deficits noted, ambulates without difficulty   Skin: Skin is warm. Psychiatric: Her behavior is normal.   Nursing note and vitals reviewed. MDM  Number of Diagnoses or Management Options  Light headedness:   Sprain of right ankle, unspecified ligament, initial encounter:   Type 2 diabetes mellitus with hyperglycemia, with long-term current use of insulin [facility]):   Diagnosis management comments: 43 yo AAF with PMHx DM, HTN presents with 1-day h/o light-headedness, left back pain, right ankle pain after fall. No fevers, no vomiting. Examination with right ankle pain with rom but otherwise unremarkable with normal neurological exam.  I do not suspect CVA clinically, will evaluate for acute process. 10:07 PM  Glucose elevated, no DKA, improved after IVF in ED. Work-up otherwise unremarkable. Xray negative to my read. Pt doing ok, states she is feeling better, ready to go home. Discussed results and poc for dc home, symptom management, follow-up, return precautions.        Amount and/or Complexity of Data Reviewed  Clinical lab tests: ordered and reviewed  Tests in the radiology section of CPT®: ordered and reviewed  Review and summarize past medical records: yes  Independent visualization of images, tracings, or specimens: yes    Patient Progress  Patient progress: improved         EKG  Date/Time: 10/15/2019 8:20 PM  Performed by: Aide Arnett MD  Authorized by: Aide Arnett MD     ECG reviewed by ED Physician in the absence of a cardiologist: yes    Previous ECG:     Previous ECG:  Compared to current    Comparison ECG info:  5/22/19    Similarity:  No change  Interpretation:     Interpretation: normal    Rate:     ECG rate:  91    ECG rate assessment: normal    Rhythm:     Rhythm: sinus rhythm    Ectopy:     Ectopy: none    QRS:     QRS axis:  Normal  Conduction:     Conduction: normal    ST segments:     ST segments:  Normal  T waves:     T waves: normal        PROGRESS NOTES    8:20 PM:   Liberty Reynoso MD arrives to the bedside to evaluate the patient. Answered the patient's questions regarding the treatment plan. CONSULTATIONS  None      MEDICATIONS ORDERED  Medications   sodium chloride 0.9 % bolus infusion 1,000 mL (0 mL IntraVENous IV Completed 10/15/19 2119)   sodium chloride 0.9 % bolus infusion 1,000 mL (1,000 mL IntraVENous New Bag 10/15/19 2120)       RADIOLOGY INTERPRETATIONS  XR ANKLE RT MIN 3 V    (Results Pending)       EKG READINGS/LABORATORY RESULTS  Recent Results (from the past 12 hour(s))   EKG, 12 LEAD, INITIAL    Collection Time: 10/15/19  8:01 PM   Result Value Ref Range    Ventricular Rate 91 BPM    Atrial Rate 91 BPM    P-R Interval 128 ms    QRS Duration 96 ms    Q-T Interval 376 ms    QTC Calculation (Bezet) 462 ms    Calculated P Axis 53 degrees    Calculated R Axis 47 degrees    Calculated T Axis 68 degrees    Diagnosis       Normal sinus rhythm  Normal ECG  When compared with ECG of 31-OCT-2017 18:11,  No significant change was found     GLUCOSE, POC    Collection Time: 10/15/19  8:05 PM   Result Value Ref Range    Glucose (POC) 388 (H) 70 - 110 mg/dL   CBC WITH AUTOMATED DIFF    Collection Time: 10/15/19  8:08 PM   Result Value Ref Range    WBC 6.7 4.6 - 13.2 K/uL    RBC 4.75 4.20 - 5.30 M/uL    HGB 11.8 (L) 12.0 - 16.0 g/dL    HCT 36.9 35.0 - 45.0 %    MCV 77.7 74.0 - 97.0 FL    MCH 24.8 24.0 - 34.0 PG    MCHC 32.0 31.0 - 37.0 g/dL    RDW 12.9 11.6 - 14.5 %    PLATELET 061 161 - 215 K/uL    MPV 11.3 9.2 - 11.8 FL    NEUTROPHILS 54 40 - 73 %    LYMPHOCYTES 35 21 - 52 %    MONOCYTES 9 3 - 10 %    EOSINOPHILS 2 0 - 5 %    BASOPHILS 0 0 - 2 %    ABS. NEUTROPHILS 3.6 1.8 - 8.0 K/UL    ABS. LYMPHOCYTES 2.4 0.9 - 3.6 K/UL    ABS. MONOCYTES 0.6 0.05 - 1.2 K/UL    ABS. EOSINOPHILS 0.1 0.0 - 0.4 K/UL    ABS.  BASOPHILS 0.0 0.0 - 0.1 K/UL    DF AUTOMATED     METABOLIC PANEL, COMPREHENSIVE    Collection Time: 10/15/19  8:08 PM   Result Value Ref Range    Sodium 139 136 - 145 mmol/L    Potassium 3.7 3.5 - 5.5 mmol/L    Chloride 105 100 - 111 mmol/L    CO2 30 21 - 32 mmol/L    Anion gap 4 3.0 - 18 mmol/L    Glucose 412 (HH) 74 - 99 mg/dL    BUN 17 7.0 - 18 MG/DL    Creatinine 0.79 0.6 - 1.3 MG/DL    BUN/Creatinine ratio 22 (H) 12 - 20      GFR est AA >60 >60 ml/min/1.73m2    GFR est non-AA >60 >60 ml/min/1.73m2    Calcium 8.9 8.5 - 10.1 MG/DL    Bilirubin, total 0.7 0.2 - 1.0 MG/DL    ALT (SGPT) 17 13 - 56 U/L    AST (SGOT) 20 10 - 38 U/L    Alk. phosphatase 109 45 - 117 U/L    Protein, total 7.0 6.4 - 8.2 g/dL    Albumin 3.3 (L) 3.4 - 5.0 g/dL    Globulin 3.7 2.0 - 4.0 g/dL    A-G Ratio 0.9 0.8 - 1.7     URINALYSIS W/ RFLX MICROSCOPIC    Collection Time: 10/15/19  8:20 PM   Result Value Ref Range    Color YELLOW      Appearance CLEAR      Specific gravity >1.030 (H) 1.005 - 1.030    pH (UA) 7.0 5.0 - 8.0      Protein NEGATIVE  NEG mg/dL    Glucose >1,000 (A) NEG mg/dL    Ketone NEGATIVE  NEG mg/dL    Bilirubin NEGATIVE  NEG      Blood NEGATIVE  NEG      Urobilinogen 1.0 0.2 - 1.0 EU/dL    Nitrites NEGATIVE  NEG      Leukocyte Esterase NEGATIVE  NEG     GLUCOSE, POC    Collection Time: 10/15/19 10:03 PM   Result Value Ref Range    Glucose (POC) 320 (H) 70 - 110 mg/dL       ED DIAGNOSIS & DISPOSITION INFORMATION  Diagnosis:   1. Type 2 diabetes mellitus with hyperglycemia, with long-term current use of insulin ([address]    2. Light headedness    3.  Sprain of right ankle, unspecified ligament, initial encounter        Disposition: Discharged    Follow-up Information     Follow up With Specialties Details Why Contact Info    Vibha Romoe MD [facility] Schedule an appointment as soon as possible for a visit  [address] [place] [id number]  [phone]      [id number] [facility] EMERGENCY DEPT Emergency Medicine  As needed [address] [facility]  [phone]          Patient's Medications Start Taking    No medications on file   Continue Taking    ACETAMINOPHEN (TYLENOL) 325 MG TABLET    Take 2 Tabs by mouth every six (6) hours as needed for Pain. ALBUTEROL (PROVENTIL HFA, VENTOLIN HFA, PROAIR HFA) 90 MCG/ACTUATION INHALER    Take 2 puffs by inhalation every four (4) hours as needed for Wheezing. BLOOD-GLUCOSE METER MONITORING KIT    Test twice daily    CETIRIZINE (ZYRTEC) 10 MG TABLET    Take 1 Tab by mouth daily. FLUTICASONE-SALMETEROL (ADVAIR) 250-50 MCG/DOSE DISKUS INHALER    Take 1 Puff by inhalation daily. GABAPENTIN (NEURONTIN) 100 MG CAPSULE    TAKE 1 CAPSULE BY MOUTH THREE TIMES DAILY FOR NEUROPATHIC PAIN    GLUCOSE BLOOD VI TEST STRIPS (BLOOD GLUCOSE TEST) STRIP    Use twice daily as directed, to match her glucometer    INSULIN NEEDLES, DISPOSABLE, (PEN NEEDLE, DIABETIC) 31 GAUGE X 1/4\" NDLE    Use twice daily for insulin injections    LEVEMIR U-100 INSULIN 100 UNIT/ML INJECTION    ADMINISTER 40 UNITS UNDER THE SKIN TWICE DAILY    LEVOTHYROXINE (SYNTHROID) 100 MCG TABLET    TAKE 1 TABLET BY MOUTH DAILY BEFORE BREAKFAST FOR HYPOTHYROIDISM    LISINOPRIL (PRINIVIL, ZESTRIL) 10 MG TABLET    TAKE 1 TABLET BY MOUTH DAILY FOR HIGH BLOOD PRESSURE    METFORMIN (GLUCOPHAGE) 1,000 MG TABLET    Take 1 Tab by mouth two (2) times daily (with meals). Indications: type 2 diabetes mellitus    POLYETHYLENE GLYCOL (MIRALAX) 17 GRAM/DOSE POWDER    1 tablespoon with 8 oz of water every 2 hours for 3 doses. 1 tablespoon with 8 oz of water twice daily until stools are soft and regular. 1 tablespoon with 8 oz of water once daily for maintenance.     SUMATRIPTAN (IMITREX) 50 MG TABLET    Take 1 at onset of migraine - may repeat 1 tab by mouth in 2 hrs if needed   These Medications have changed    No medications on file   Stop Taking    No medications on file           Chato Coyle MD.

## 2019-10-16 NOTE — ED NOTES
10:31 PM  10/15/19     Discharge instructions given to Franco Reid (name) with verbalization of understanding. Patient accompanied by family. Patient discharged with the following prescriptions none. Patient discharged to home (destination).       Cameron Flower

## 2019-10-17 LAB
ATRIAL RATE: 91 BPM
CALCULATED P AXIS, ECG09: 53 DEGREES
CALCULATED R AXIS, ECG10: 47 DEGREES
CALCULATED T AXIS, ECG11: 68 DEGREES
DIAGNOSIS, 93000: NORMAL
P-R INTERVAL, ECG05: 128 MS
Q-T INTERVAL, ECG07: 376 MS
QRS DURATION, ECG06: 96 MS
QTC CALCULATION (BEZET), ECG08: 462 MS
VENTRICULAR RATE, ECG03: 91 BPM

## 2019-11-21 ENCOUNTER — OFFICE VISIT (OUTPATIENT)
Dept: FAMILY MEDICINE CLINIC | Facility: CLINIC | Age: 49
End: 2019-11-21

## 2019-11-21 VITALS
OXYGEN SATURATION: 99 % | HEIGHT: 65 IN | TEMPERATURE: 98 F | HEART RATE: 93 BPM | SYSTOLIC BLOOD PRESSURE: 168 MMHG | RESPIRATION RATE: 12 BRPM | WEIGHT: 161 LBS | DIASTOLIC BLOOD PRESSURE: 99 MMHG | BODY MASS INDEX: 26.82 KG/M2

## 2019-11-21 DIAGNOSIS — M79.10 MYALGIA: ICD-10-CM

## 2019-11-21 DIAGNOSIS — E11.40 TYPE 2 DIABETES, CONTROLLED, WITH NEUROPATHY (HCC): ICD-10-CM

## 2019-11-21 DIAGNOSIS — E03.9 HYPOTHYROIDISM, UNSPECIFIED TYPE: ICD-10-CM

## 2019-11-21 DIAGNOSIS — Z79.4 TYPE 2 DIABETES MELLITUS WITHOUT COMPLICATION, WITH LONG-TERM CURRENT USE OF INSULIN (HCC): ICD-10-CM

## 2019-11-21 DIAGNOSIS — G43.019 INTRACTABLE MIGRAINE WITHOUT AURA AND WITHOUT STATUS MIGRAINOSUS: ICD-10-CM

## 2019-11-21 DIAGNOSIS — J45.40 ASTHMA IN ADULT, MODERATE PERSISTENT, UNCOMPLICATED: ICD-10-CM

## 2019-11-21 DIAGNOSIS — E11.9 TYPE 2 DIABETES MELLITUS WITHOUT COMPLICATION, WITH LONG-TERM CURRENT USE OF INSULIN (HCC): ICD-10-CM

## 2019-11-21 DIAGNOSIS — Z13.21 ENCOUNTER FOR VITAMIN DEFICIENCY SCREENING: ICD-10-CM

## 2019-11-21 DIAGNOSIS — I10 ESSENTIAL HYPERTENSION: ICD-10-CM

## 2019-11-21 DIAGNOSIS — Z12.39 BREAST CANCER SCREENING: Primary | ICD-10-CM

## 2019-11-21 DIAGNOSIS — Z12.11 COLON CANCER SCREENING: ICD-10-CM

## 2019-11-21 LAB — HBA1C MFR BLD HPLC: 13.4 %

## 2019-11-21 RX ORDER — INSULIN ASPART 100 [IU]/ML
INJECTION, SOLUTION INTRAVENOUS; SUBCUTANEOUS
Qty: 5 ADJUSTABLE DOSE PRE-FILLED PEN SYRINGE | Refills: 2 | Status: SHIPPED | OUTPATIENT
Start: 2019-11-21 | End: 2019-11-22 | Stop reason: SDUPTHER

## 2019-11-21 RX ORDER — FLUCONAZOLE 150 MG/1
150 TABLET ORAL
Qty: 2 TAB | Refills: 0 | Status: SHIPPED | OUTPATIENT
Start: 2019-11-21 | End: 2019-11-25

## 2019-11-21 RX ORDER — LEVOTHYROXINE SODIUM 100 UG/1
TABLET ORAL
Qty: 30 TAB | Refills: 0 | Status: SHIPPED | OUTPATIENT
Start: 2019-11-21 | End: 2019-11-21 | Stop reason: SDUPTHER

## 2019-11-21 RX ORDER — ATORVASTATIN CALCIUM 20 MG/1
20 TABLET, FILM COATED ORAL DAILY
Qty: 30 TAB | Refills: 2 | Status: SHIPPED | OUTPATIENT
Start: 2019-11-21 | End: 2020-11-02

## 2019-11-21 RX ORDER — METFORMIN HYDROCHLORIDE 1000 MG/1
1000 TABLET ORAL 2 TIMES DAILY WITH MEALS
Qty: 60 TAB | Refills: 11 | Status: SHIPPED | OUTPATIENT
Start: 2019-11-21 | End: 2022-03-02 | Stop reason: SDUPTHER

## 2019-11-21 RX ORDER — FLUTICASONE PROPIONATE AND SALMETEROL 250; 50 UG/1; UG/1
1 POWDER RESPIRATORY (INHALATION) DAILY
Qty: 1 INHALER | Refills: 3 | Status: SHIPPED | OUTPATIENT
Start: 2019-11-21 | End: 2021-05-21 | Stop reason: SINTOL

## 2019-11-21 RX ORDER — LISINOPRIL 20 MG/1
20 TABLET ORAL DAILY
Qty: 30 TAB | Refills: 2 | Status: SHIPPED | OUTPATIENT
Start: 2019-11-21 | End: 2021-08-02 | Stop reason: SDUPTHER

## 2019-11-21 RX ORDER — LISINOPRIL 10 MG/1
TABLET ORAL
Qty: 90 TAB | Refills: 6 | Status: CANCELLED | OUTPATIENT
Start: 2019-11-21

## 2019-11-21 RX ORDER — SUMATRIPTAN 50 MG/1
TABLET, FILM COATED ORAL
Qty: 10 TAB | Refills: 11 | Status: CANCELLED | OUTPATIENT
Start: 2019-11-21

## 2019-11-21 NOTE — PROGRESS NOTES
Visit Vitals  BP (!) 168/99   Pulse 93   Temp 98 °F (36.7 °C) (Oral)   Resp 12   Ht 5' 5\" (1.651 m)   Wt 161 lb (73 kg)   SpO2 99%   BMI 26.79 kg/m²     Patricia Lower presents today for   Chief Complaint   Patient presents with    New Patient     establish care    Medication Evaluation       Is someone accompanying this pt? no    Is the patient using any DME equipment during OV? no    Depression Screening:  3 most recent PHQ Screens 11/21/2019   Little interest or pleasure in doing things Not at all   Feeling down, depressed, irritable, or hopeless Not at all   Total Score PHQ 2 0       Learning Assessment:  Learning Assessment 3/5/2015   PRIMARY LEARNER Patient   HIGHEST LEVEL OF EDUCATION - PRIMARY LEARNER  GRADUATED HIGH SCHOOL OR GED   BARRIERS PRIMARY LEARNER NONE   CO-LEARNER CAREGIVER -   PRIMARY LANGUAGE ENGLISH   LEARNER PREFERENCE PRIMARY READING   ANSWERED BY Patient   RELATIONSHIP SELF       Abuse Screening:  No flowsheet data found. Fall Risk  No flowsheet data found. Health Maintenance reviewed and discussed and ordered per Provider. Health Maintenance Due   Topic Date Due    Pneumococcal 0-64 years (1 of 1 - PPSV23) 02/16/1974    EYE EXAM RETINAL OR DILATED  02/16/1978    BREAST CANCER SCRN MAMMOGRAM  10/29/2016    LIPID PANEL Q1  04/06/2017    FOOT EXAM Q1  11/21/2017    MICROALBUMIN Q1  11/21/2017    Shingrix Vaccine Age 50> (1 of 2) 02/16/2018    FOBT Q 1 YEAR AGE 50-75  02/16/2018    PAP AKA CERVICAL CYTOLOGY  03/05/2018    Influenza Age 9 to Adult  08/01/2019    HEMOGLOBIN A1C Q6M  09/28/2019           Coordination of Care:  1. Have you been to the ER, urgent care clinic since your last visit? Hospitalized since your last visit? yes    2. Have you seen or consulted any other health care providers outside of the [address] [place] [address] since your last visit? Include any pap smears or colon screening.  no

## 2019-11-22 DIAGNOSIS — E11.40 TYPE 2 DIABETES, CONTROLLED, WITH NEUROPATHY (HCC): ICD-10-CM

## 2019-11-22 RX ORDER — LEVOTHYROXINE SODIUM 100 UG/1
TABLET ORAL
Qty: 90 TAB | Refills: 0 | Status: SHIPPED | OUTPATIENT
Start: 2019-11-22 | End: 2020-03-03

## 2019-11-22 RX ORDER — INSULIN ASPART 100 [IU]/ML
INJECTION, SOLUTION INTRAVENOUS; SUBCUTANEOUS
Qty: 5 ADJUSTABLE DOSE PRE-FILLED PEN SYRINGE | Refills: 2 | Status: SHIPPED | OUTPATIENT
Start: 2019-11-22 | End: 2019-12-24 | Stop reason: CLARIF

## 2019-11-22 NOTE — TELEPHONE ENCOUNTER
Requested Prescriptions     Pending Prescriptions Disp Refills    insulin aspart U-100 (NOVOLOG) 100 unit/mL (3 mL) inpn 5 Adjustable Dose Pre-filled Pen Syringe 2     Sig: SS insulin TIDAC: <150/0u, 151-200/2u, 201-250/4u, 251-300/6u, 301-350/8u, >351/10 and call

## 2019-12-01 RX ORDER — GABAPENTIN 100 MG/1
CAPSULE ORAL
Qty: 180 CAP | Refills: 2 | Status: SHIPPED | OUTPATIENT
Start: 2019-12-01 | End: 2019-12-02 | Stop reason: SDUPTHER

## 2019-12-02 NOTE — PROGRESS NOTES
Juan Delgado is a 44 y.o. female presenting today for New Patient (establish care) and Medication Evaluation  . HPI:  Juan Delgado presents to the office today to establish care with the practice. She has a PMH for hypertension, hypothyroidism, headaches, diabetes mellitus, and asthma. She is a previous patient of Dr.Mary Virgil Kentas has not been seen since February 2018. Her last hemoglobin A1c was in March 2019 and was 13.4. She denies any polyuria polydipsia or polyphagia. She currently takes Levemir and metformin daily. Her blood pressure is elevated today at 168/99. She has not had her blood pressure medicine for several days and is requesting a refill. She is also not had any of her hyperlipidemia medication. She presents without any complaints of chest pain, palpitation, or lower extremity edema. She denies any cough or wheezing today. Review of Systems   Respiratory: Negative for cough, sputum production and shortness of breath. Cardiovascular: Negative for chest pain, palpitations and leg swelling. Gastrointestinal: Negative for abdominal pain, nausea and vomiting. Neurological: Negative for dizziness and headaches. Allergies   Allergen Reactions    Vicodin [Hydrocodone-Acetaminophen] Shortness of Breath       Current Outpatient Medications   Medication Sig Dispense Refill    gabapentin (NEURONTIN) 100 mg capsule TAKE 2 CAPSULE BY MOUTH THREE TIMES DAILY FOR NEUROPATHIC PAIN 180 Cap 2    fluticasone propion-salmeterol (ADVAIR/WIXELA) 250-50 mcg/dose diskus inhaler Take 1 Puff by inhalation daily. 1 Inhaler 3    metFORMIN (GLUCOPHAGE) 1,000 mg tablet Take 1 Tab by mouth two (2) times daily (with meals). Indications: type 2 diabetes mellitus 60 Tab 11    atorvastatin (LIPITOR) 20 mg tablet Take 1 Tab by mouth daily. 30 Tab 2    lisinopril (PRINIVIL, ZESTRIL) 20 mg tablet Take 1 Tab by mouth daily.  30 Tab 2    polyethylene glycol (MIRALAX) 17 gram/dose powder 1 tablespoon with 8 oz of water every 2 hours for 3 doses. 1 tablespoon with 8 oz of water twice daily until stools are soft and regular. 1 tablespoon with 8 oz of water once daily for maintenance. 600 g 0    LEVEMIR U-100 INSULIN 100 unit/mL injection ADMINISTER 40 UNITS UNDER THE SKIN TWICE DAILY 20 mL 11    Insulin Needles, Disposable, (PEN NEEDLE, DIABETIC) 31 gauge x 1/4\" ndle Use twice daily for insulin injections 90 Pen Needle 2    Blood-Glucose Meter monitoring kit Test twice daily 1 Kit 0    glucose blood VI test strips (BLOOD GLUCOSE TEST) strip Use twice daily as directed, to match her glucometer 100 Strip 11    SUMAtriptan (IMITREX) 50 mg tablet Take 1 at onset of migraine - may repeat 1 tab by mouth in 2 hrs if needed 10 Tab 11    cetirizine (ZYRTEC) 10 mg tablet Take 1 Tab by mouth daily. (Patient taking differently: Take 10 mg by mouth daily as needed.) 90 Tab 3    albuterol (PROVENTIL HFA, VENTOLIN HFA, PROAIR HFA) 90 mcg/actuation inhaler Take 2 puffs by inhalation every four (4) hours as needed for Wheezing. 1 Inhaler 0    levothyroxine (SYNTHROID) 100 mcg tablet TAKE 1 TABLET BY MOUTH DAILY BEFORE BREAKFAST FOR HYPOTHYROIDISM 90 Tab 0    insulin aspart U-100 (NOVOLOG) 100 unit/mL (3 mL) inpn SS insulin TIDAC: <150/0u, 151-200/2u, 201-250/4u, 251-300/6u, 301-350/8u, >351/10 and call 5 Adjustable Dose Pre-filled Pen Syringe 2    acetaminophen (TYLENOL) 325 mg tablet Take 2 Tabs by mouth every six (6) hours as needed for Pain.  20 Tab 0       Past Medical History:   Diagnosis Date    Asthma     Bronchitis     Cataract     Diabetes ([address]     Endocrine disease     hyperthyroidism    Endometriosis     Hypertension     Irregular bleeding     Migraine     Pelvic pain     Thyroid disease        Past Surgical History:   Procedure Laterality Date    HX  SECTION      x 3    HX COLONOSCOPY      HX GYN      partial hysterectomy    HX GYN      cervical laparoscopy; endometriosis    HX HYSTERECTOMY         Social History     Socioeconomic History    Marital status:      Spouse name: Not on file    Number of children: Not on file    Years of education: Not on file    Highest education level: Not on file   Occupational History    Not on file   Social Needs    Financial resource strain: Not on file    Food insecurity:     Worry: Not on file     Inability: Not on file    Transportation needs:     Medical: Not on file     Non-medical: Not on file   Tobacco Use    Smoking status: Never Smoker    Smokeless tobacco: Never Used   Substance and Sexual Activity    Alcohol use: Yes     Alcohol/week: 0.0 standard drinks     Comment: occasional    Drug use: No    Sexual activity: Yes     Birth control/protection: None   Lifestyle    Physical activity:     Days per week: Not on file     Minutes per session: Not on file    Stress: Not on file   Relationships    Social connections:     Talks on phone: Not on file     Gets together: Not on file     Attends Mandaeism service: Not on file     Active member of club or organization: Not on file     Attends meetings of clubs or organizations: Not on file     Relationship status: Not on file    Intimate partner violence:     Fear of current or ex partner: Not on file     Emotionally abused: Not on file     Physically abused: Not on file     Forced sexual activity: Not on file   Other Topics Concern    Not on file   Social History Narrative    Not on file       Patient does not have an advanced directive on file    Vitals:    11/21/19 1606   BP: (!) 168/99   Pulse: 93   Resp: 12   Temp: 98 °F (36.7 °C)   TempSrc: Oral   SpO2: 99%   Weight: 161 lb (73 kg)   Height: 5' 5\" (1.651 m)   PainSc:   0 - No pain       Physical Exam  Cardiovascular:      Rate and Rhythm: Normal rate and regular rhythm. Pulses: Normal pulses. Heart sounds: Normal heart sounds. Pulmonary:      Effort: Pulmonary effort is normal. No respiratory distress. Breath sounds: Normal breath sounds. Abdominal:      General: Abdomen is flat. Bowel sounds are normal.      Palpations: Abdomen is soft. Skin:     General: Skin is warm and dry. Neurological:      Mental Status: She is alert. Office Visit on 11/21/2019   Component Date Value Ref Range Status    Hemoglobin A1c (POC) 11/21/2019 13.4  % Final   Admission on 10/15/2019, Discharged on 10/15/2019   Component Date Value Ref Range Status    Ventricular Rate 10/15/2019 91  BPM Final    Atrial Rate 10/15/2019 91  BPM Final    P-R Interval 10/15/2019 128  ms Final    QRS Duration 10/15/2019 96  ms Final    Q-T Interval 10/15/2019 376  ms Final    QTC Calculation (Bezet) 10/15/2019 462  ms Final    Calculated P Axis 10/15/2019 53  degrees Final    Calculated R Axis 10/15/2019 47  degrees Final    Calculated T Axis 10/15/2019 68  degrees Final    Diagnosis 10/15/2019    Final                    Value:Normal sinus rhythm  Normal ECG  When compared with ECG of 31-OCT-2017 18:11,  No significant change was found  Confirmed by Kain Raines ([id number]) on 10/17/2019 7:28:55 AM      Glucose (POC) 10/15/2019 388* 70 - 110 mg/dL Final    Comment: (NOTE)  The FDA has indicated that no capillary point of care blood glucose   monitoring systems are approved for use in \"critically ill\" patients,   however they have not defined this population. The College of   American Pathologists has recommended that these devices should not   be used in cases such as severe hypotension, dehydration, shock, and   hyperglycemic-hyperosmolar state, amongst others. Venous or arterial   collection is the recommended specimen for testing these patients.       WBC 10/15/2019 6.7  4.6 - 13.2 K/uL Final    RBC 10/15/2019 4.75  4.20 - 5.30 M/uL Final    HGB 10/15/2019 11.8* 12.0 - 16.0 g/dL Final    HCT 10/15/2019 36.9  35.0 - 45.0 % Final    MCV 10/15/2019 77.7  74.0 - 97.0 FL Final    MCH 10/15/2019 24.8  24.0 - 34.0 PG Final    MCHC 10/15/2019 32.0  31.0 - 37.0 g/dL Final    RDW 10/15/2019 12.9  11.6 - 14.5 % Final    PLATELET 96/82/8949 782  135 - 420 K/uL Final    MPV 10/15/2019 11.3  9.2 - 11.8 FL Final    NEUTROPHILS 10/15/2019 54  40 - 73 % Final    LYMPHOCYTES 10/15/2019 35  21 - 52 % Final    MONOCYTES 10/15/2019 9  3 - 10 % Final    EOSINOPHILS 10/15/2019 2  0 - 5 % Final    BASOPHILS 10/15/2019 0  0 - 2 % Final    ABS. NEUTROPHILS 10/15/2019 3.6  1.8 - 8.0 K/UL Final    ABS. LYMPHOCYTES 10/15/2019 2.4  0.9 - 3.6 K/UL Final    ABS. MONOCYTES 10/15/2019 0.6  0.05 - 1.2 K/UL Final    ABS. EOSINOPHILS 10/15/2019 0.1  0.0 - 0.4 K/UL Final    ABS. BASOPHILS 10/15/2019 0.0  0.0 - 0.1 K/UL Final    DF 10/15/2019 AUTOMATED    Final    Sodium 10/15/2019 139  136 - 145 mmol/L Final    Potassium 10/15/2019 3.7  3.5 - 5.5 mmol/L Final    Chloride 10/15/2019 105  100 - 111 mmol/L Final    CO2 10/15/2019 30  21 - 32 mmol/L Final    Anion gap 10/15/2019 4  3.0 - 18 mmol/L Final    Glucose 10/15/2019 412* 74 - 99 mg/dL Final    Comment: CALLED TO AND CORRECTLY REPEATED BY:  Gladys Palacios RN, ER, 10/15/19 AT 2040 TO [facility]      BUN 10/15/2019 17  7.0 - 18 MG/DL Final    Creatinine 10/15/2019 0.79  0.6 - 1.3 MG/DL Final    BUN/Creatinine ratio 10/15/2019 22* 12 - 20   Final    GFR est AA 10/15/2019 >60  >60 ml/min/1.73m2 Final    GFR est non-AA 10/15/2019 >60  >60 ml/min/1.73m2 Final    Comment: (NOTE)  Estimated GFR is calculated using the Modification of Diet in Renal   Disease (MDRD) Study equation, reported for both  Americans   (GFRAA) and non- Americans (GFRNA), and normalized to 1.73m2   body surface area. The physician must decide which value applies to   the patient. The MDRD study equation should only be used in   individuals age 25 or older.  It has not been validated for the   following: pregnant women, patients with serious comorbid conditions,   or on certain medications, or persons with extremes of body size,   muscle mass, or nutritional status.  Calcium 10/15/2019 8.9  8.5 - 10.1 MG/DL Final    Bilirubin, total 10/15/2019 0.7  0.2 - 1.0 MG/DL Final    ALT (SGPT) 10/15/2019 17  13 - 56 U/L Final    AST (SGOT) 10/15/2019 20  10 - 38 U/L Final    Alk. phosphatase 10/15/2019 109  45 - 117 U/L Final    Protein, total 10/15/2019 7.0  6.4 - 8.2 g/dL Final    Albumin 10/15/2019 3.3* 3.4 - 5.0 g/dL Final    Globulin 10/15/2019 3.7  2.0 - 4.0 g/dL Final    A-G Ratio 10/15/2019 0.9  0.8 - 1.7   Final    Color 10/15/2019 YELLOW    Final    Appearance 10/15/2019 CLEAR    Final    Specific gravity 10/15/2019 >1.030* 1.005 - 1.030 Final    pH (UA) 10/15/2019 7.0  5.0 - 8.0   Final    Protein 10/15/2019 NEGATIVE   NEG mg/dL Final    Glucose 10/15/2019 >1,000* NEG mg/dL Final    Ketone 10/15/2019 NEGATIVE   NEG mg/dL Final    Bilirubin 10/15/2019 NEGATIVE   NEG   Final    Blood 10/15/2019 NEGATIVE   NEG   Final    Urobilinogen 10/15/2019 1.0  0.2 - 1.0 EU/dL Final    Nitrites 10/15/2019 NEGATIVE   NEG   Final    Leukocyte Esterase 10/15/2019 NEGATIVE   NEG   Final    Glucose (POC) 10/15/2019 320* 70 - 110 mg/dL Final    Comment: (NOTE)  The FDA has indicated that no capillary point of care blood glucose   monitoring systems are approved for use in \"critically ill\" patients,   however they have not defined this population. The College of   American Pathologists has recommended that these devices should not   be used in cases such as severe hypotension, dehydration, shock, and   hyperglycemic-hyperosmolar state, amongst others. Venous or arterial   collection is the recommended specimen for testing these patients. .  Results for orders placed or performed in visit on 11/21/19   AMB POC HEMOGLOBIN A1C   Result Value Ref Range    Hemoglobin A1c (POC) 13.4 %       Assessment / Plan:      ICD-10-CM ICD-9-CM    1. Breast cancer screening Z12.39 V76.10 THELMA MAMMO BI SCREENING INCL CAD   2.  Type 2 diabetes, controlled, with neuropathy (HCC) E11.40 250.60 gabapentin (NEURONTIN) 100 mg capsule     357.2 AMB POC HEMOGLOBIN A1C      atorvastatin (LIPITOR) 20 mg tablet      fluconazole (DIFLUCAN) 150 mg tablet      REFERRAL TO NUTRITION      MICROALBUMIN, UR, RAND W/ MICROALB/CREAT RATIO      DISCONTINUED: insulin aspart U-100 (NOVOLOG) 100 unit/mL (3 mL) inpn   3. Hypothyroidism, unspecified type E03.9 244.9 TSH 3RD GENERATION      REFERRAL TO ENDOCRINOLOGY      DISCONTINUED: levothyroxine (SYNTHROID) 100 mcg tablet   4. Essential hypertension I10 401.9 lisinopril (PRINIVIL, ZESTRIL) 20 mg tablet      METABOLIC PANEL, COMPREHENSIVE      LIPID PANEL      CBC WITH AUTOMATED DIFF   5. Intractable migraine without aura and without status migrainosus G43.019 346.11    6. Asthma in adult, moderate persistent, uncomplicated I14.50 577.63 fluticasone propion-salmeterol (ADVAIR/WIXELA) 250-50 mcg/dose diskus inhaler   7. Type 2 diabetes mellitus without complication, with long-term current use of insulin (HCC) E11.9 250.00 metFORMIN (GLUCOPHAGE) 1,000 mg tablet    Z79.4 V58.67    8. Colon cancer screening Z12.11 V76.51 REFERRAL TO COLON AND RECTAL SURGERY   9. Encounter for vitamin deficiency screening Z13.21 V77.99 VITAMIN D, 25 HYDROXY   10. Myalgia M79.10 729.1 C REACTIVE PROTEIN, QT      SED RATE (ESR)     Medication refills given  Added aspart sliding scale  Mammo-ordered  Referral for colonoscopy ordered  Vitamin D screening  Patient complaining about myalgia-C-reactive protein and sed rate ordered  Hypertension-refilled lisinopril  Diabetes mellitus-hemoglobin A1c 13.4. Referral to endocrinology placed    Follow-up and Dispositions    · Return in about 4 weeks (around 12/19/2019), or if symptoms worsen or fail to improve. I asked the patient if she  had any questions and answered her  questions.   The patient stated that she understands the treatment plan and agrees with the treatment plan    This document was created with a voice activated dictation system and may contain transcription errors.

## 2019-12-24 DIAGNOSIS — E11.9 TYPE 2 DIABETES MELLITUS WITHOUT COMPLICATION, WITH LONG-TERM CURRENT USE OF INSULIN (HCC): Primary | ICD-10-CM

## 2019-12-24 DIAGNOSIS — Z79.4 TYPE 2 DIABETES MELLITUS WITHOUT COMPLICATION, WITH LONG-TERM CURRENT USE OF INSULIN (HCC): Primary | ICD-10-CM

## 2019-12-24 RX ORDER — INSULIN LISPRO 100 [IU]/ML
INJECTION, SOLUTION INTRAVENOUS; SUBCUTANEOUS
Qty: 1 ADJUSTABLE DOSE PRE-FILLED PEN SYRINGE | Refills: 2 | Status: SHIPPED | OUTPATIENT
Start: 2019-12-24 | End: 2020-04-03 | Stop reason: CLARIF

## 2019-12-26 ENCOUNTER — TELEPHONE (OUTPATIENT)
Dept: FAMILY MEDICINE CLINIC | Facility: CLINIC | Age: 49
End: 2019-12-26

## 2019-12-26 NOTE — TELEPHONE ENCOUNTER
Called pt left  informing pt her Novolog was changed to Humalog due to her insurance not covering it.

## 2020-03-03 DIAGNOSIS — E03.9 HYPOTHYROIDISM, UNSPECIFIED TYPE: ICD-10-CM

## 2020-03-03 RX ORDER — LEVOTHYROXINE SODIUM 100 UG/1
TABLET ORAL
Qty: 30 TAB | Refills: 0 | Status: SHIPPED | OUTPATIENT
Start: 2020-03-03 | End: 2020-04-04 | Stop reason: SDUPTHER

## 2020-04-03 ENCOUNTER — VIRTUAL VISIT (OUTPATIENT)
Dept: FAMILY MEDICINE CLINIC | Facility: CLINIC | Age: 50
End: 2020-04-03

## 2020-04-03 DIAGNOSIS — E11.9 TYPE 2 DIABETES MELLITUS WITHOUT COMPLICATION, WITH LONG-TERM CURRENT USE OF INSULIN (HCC): ICD-10-CM

## 2020-04-03 DIAGNOSIS — M25.571 JOINT PAIN OF ANKLE AND FOOT, RIGHT: Primary | ICD-10-CM

## 2020-04-03 DIAGNOSIS — G62.9 NEUROPATHY: ICD-10-CM

## 2020-04-03 DIAGNOSIS — Z79.4 TYPE 2 DIABETES MELLITUS WITHOUT COMPLICATION, WITH LONG-TERM CURRENT USE OF INSULIN (HCC): ICD-10-CM

## 2020-04-03 RX ORDER — INSULIN ASPART 100 [IU]/ML
INJECTION, SOLUTION INTRAVENOUS; SUBCUTANEOUS
Qty: 5 ADJUSTABLE DOSE PRE-FILLED PEN SYRINGE | Refills: 3 | Status: SHIPPED | OUTPATIENT
Start: 2020-04-03 | End: 2020-04-15 | Stop reason: SDUPTHER

## 2020-04-03 RX ORDER — FLASH GLUCOSE SCANNING READER
1 EACH MISCELLANEOUS
Qty: 1 EACH | Refills: 5 | Status: SHIPPED | OUTPATIENT
Start: 2020-04-03 | End: 2022-03-02 | Stop reason: SDUPTHER

## 2020-04-03 RX ORDER — FLASH GLUCOSE SENSOR
1 KIT MISCELLANEOUS
Qty: 1 KIT | Refills: 4 | Status: SHIPPED | OUTPATIENT
Start: 2020-04-03 | End: 2020-09-30

## 2020-04-03 RX ORDER — GABAPENTIN 300 MG/1
300 CAPSULE ORAL 2 TIMES DAILY
Qty: 60 CAP | Refills: 0 | Status: SHIPPED | OUTPATIENT
Start: 2020-04-03 | End: 2020-05-21 | Stop reason: SDUPTHER

## 2020-04-03 NOTE — PROGRESS NOTES
Consent: Trino Emery, who was seen by synchronous (real-time) audio-video technology, and/or her healthcare decision maker, is aware that this patient-initiated, Telehealth encounter on 4/3/2020 is a billable service, with coverage as determined by her insurance carrier. She is aware that she may receive a bill and has provided verbal consent to proceed: Yes. Assessment & Plan:   Diagnoses and all orders for this visit:    1. Joint pain of ankle and foot, right  -     REFERRAL TO PODIATRY    2. Type 2 diabetes mellitus without complication, with long-term current use of insulin (HCC)  -     gabapentin (NEURONTIN) 300 mg capsule; Take 1 Cap by mouth two (2) times a day. Max Daily Amount: 600 mg.  -     insulin aspart U-100 (NOVOLOG) 100 unit/mL (3 mL) inpn; <150 BS/0 units, 151-200/2 units, 201-250/4 units, 251-300/6 units, 301-350/8 units, >351-400/10 units and call provider  -     flash glucose scanning reader (FreeStyle Sabrina 14 Day [place]) misc; 1 Each by Does Not Apply route Before breakfast, lunch, dinner and at bedtime.  -     flash glucose sensor (FreeStyle Sabrina 14 Day Sensor) kit; 1 Each by Does Not Apply route Before breakfast, lunch, dinner and at bedtime. 3. Neuropathy  -     gabapentin (NEURONTIN) 300 mg capsule; Take 1 Cap by mouth two (2) times a day. Max Daily Amount: 600 mg. I spent at least 15 minutes with this established patient, and >50% of the time was spent counseling and/or coordinating care regarding   712  Subjective:   Trino Emery is a 44 y.o. female who was seen for Foot Pain and Diabetes  The patient presents for an Audio-visual teleconference appointment for bilateral foot pain. Pain is complaining of numbness and tingling to her feet. Notes she has right ankle pain and the 2nd phalange jerks. Notes swelling to the right foot. DM-Last hgb A1C 13.4. Patient is currently taking Levimir 40 units twice daily. Unable to tolerate the Humalog secondary to nausea.   She has had Novolog in the the pas without any side effects. Glucose 4/2/20 (321). Negative for polyuria or polydipsia. Prior to Admission medications    Medication Sig Start Date End Date Taking? Authorizing Provider   gabapentin (NEURONTIN) 300 mg capsule Take 1 Cap by mouth two (2) times a day. Max Daily Amount: 600 mg. 4/3/20  Yes Alvino Trivedi NP   insulin aspart U-100 (NOVOLOG) 100 unit/mL (3 mL) inpn <150 BS/0 units, 151-200/2 units, 201-250/4 units, 251-300/6 units, 301-350/8 units, >351-400/10 units and call provider 4/3/20  Yes Alvino Trivedi NP   flash glucose scanning reader (FreeStyle Sabrina 14 Day [place]) misc 1 Each by Does Not Apply route Before breakfast, lunch, dinner and at bedtime. 4/3/20  Yes Alvino Tirvedi NP   flash glucose sensor (FreeStyle Sabrina 14 Day Sensor) kit 1 Each by Does Not Apply route Before breakfast, lunch, dinner and at bedtime. 4/3/20  Yes Alvino Trivedi NP   fluticasone propion-salmeterol (ADVAIR/WIXELA) 250-50 mcg/dose diskus inhaler Take 1 Puff by inhalation daily. 11/21/19  Yes Alvino Trivedi NP   metFORMIN (GLUCOPHAGE) 1,000 mg tablet Take 1 Tab by mouth two (2) times daily (with meals). Indications: type 2 diabetes mellitus 11/21/19  Yes Alvino Trivedi NP   atorvastatin (LIPITOR) 20 mg tablet Take 1 Tab by mouth daily. 11/21/19  Yes Alvino Trivedi NP   lisinopril (PRINIVIL, ZESTRIL) 20 mg tablet Take 1 Tab by mouth daily. 11/21/19  Yes Alvino Trivedi NP   acetaminophen (TYLENOL) 325 mg tablet Take 2 Tabs by mouth every six (6) hours as needed for Pain. 6/4/19  Yes Nathalia Garcia PA-C   polyethylene glycol (MIRALAX) 17 gram/dose powder 1 tablespoon with 8 oz of water every 2 hours for 3 doses. 1 tablespoon with 8 oz of water twice daily until stools are soft and regular. 1 tablespoon with 8 oz of water once daily for maintenance.  6/4/19  Yes Nathalia Garcia PA-C   LEVEMIR U-100 INSULIN 100 unit/mL injection ADMINISTER 40 UNITS UNDER THE SKIN TWICE DAILY 4/1/19  Yes Tulio Wolff MD   Insulin Needles, Disposable, (PEN NEEDLE, DIABETIC) 31 gauge x 1/4\" ndle Use twice daily for insulin injections 8/13/18  Yes Louie Peguero MD   Blood-Glucose Meter monitoring kit Test twice daily 2/16/18  Yes Louie Peguero MD   SUMAtriptan (IMITREX) 50 mg tablet Take 1 at onset of migraine - may repeat 1 tab by mouth in 2 hrs if needed 1/15/18  Yes Louie Peguero MD   cetirizine (ZYRTEC) 10 mg tablet Take 1 Tab by mouth daily. Patient taking differently: Take 10 mg by mouth daily as needed. 3/2/15  Yes Delila Alpers, NP   albuterol (PROVENTIL HFA, VENTOLIN HFA, PROAIR HFA) 90 mcg/actuation inhaler Take 2 puffs by inhalation every four (4) hours as needed for Wheezing.  11/28/14  Yes Ann Marie Graves MD   levothyroxine (SYNTHROID) 100 mcg tablet TAKE 1 TABLET BY MOUTH DAILY BEFORE BREAKFAST FOR HYPOTHYROIDISM 4/4/20   Afsaneh Seymour NP   glucose blood VI test strips (BLOOD GLUCOSE TEST) strip Use twice daily as directed, to match her glucometer 2/16/18   Louie Peguero MD     Allergies   Allergen Reactions    Vicodin [Hydrocodone-Acetaminophen] Shortness of Breath       Patient Active Problem List   Diagnosis Code    Essential hypertension I10    Type 2 diabetes mellitus without complication, with long-term current use of insulin [facility] E11.9, Z79.4    Asthma J45.909    Hypothyroidism E03.9    Chronic seasonal allergic rhinitis J30.2    Intractable migraine without aura and without status migrainosus G43.019    Hyperlipidemia associated with type 2 diabetes mellitus ([facility] E11.69, E78.5    Mild intermittent asthma without complication Z27.08     Patient Active Problem List    Diagnosis Date Noted    Hyperlipidemia associated with type 2 diabetes mellitus ([facility] 01/15/2018    Mild intermittent asthma without complication 69/73/8552    Intractable migraine without aura and without status migrainosus 06/21/2016    Essential hypertension 03/02/2015    Type 2 diabetes mellitus without complication, with long-term current use of insulin (HCC) 03/02/2015    Asthma 03/02/2015    Hypothyroidism 03/02/2015    Chronic seasonal allergic rhinitis 03/02/2015     Current Outpatient Medications   Medication Sig Dispense Refill    gabapentin (NEURONTIN) 300 mg capsule Take 1 Cap by mouth two (2) times a day. Max Daily Amount: 600 mg. 60 Cap 0    insulin aspart U-100 (NOVOLOG) 100 unit/mL (3 mL) inpn <150 BS/0 units, 151-200/2 units, 201-250/4 units, 251-300/6 units, 301-350/8 units, >351-400/10 units and call provider 5 Adjustable Dose Pre-filled Pen Syringe 3    flash glucose scanning reader (FreeStyle Sabrina 14 Day [place]) misc 1 Each by Does Not Apply route Before breakfast, lunch, dinner and at bedtime. 1 Each 5    flash glucose sensor (FreeStyle Sabrina 14 Day Sensor) kit 1 Each by Does Not Apply route Before breakfast, lunch, dinner and at bedtime. 1 Kit 4    fluticasone propion-salmeterol (ADVAIR/WIXELA) 250-50 mcg/dose diskus inhaler Take 1 Puff by inhalation daily. 1 Inhaler 3    metFORMIN (GLUCOPHAGE) 1,000 mg tablet Take 1 Tab by mouth two (2) times daily (with meals). Indications: type 2 diabetes mellitus 60 Tab 11    atorvastatin (LIPITOR) 20 mg tablet Take 1 Tab by mouth daily. 30 Tab 2    lisinopril (PRINIVIL, ZESTRIL) 20 mg tablet Take 1 Tab by mouth daily. 30 Tab 2    acetaminophen (TYLENOL) 325 mg tablet Take 2 Tabs by mouth every six (6) hours as needed for Pain. 20 Tab 0    polyethylene glycol (MIRALAX) 17 gram/dose powder 1 tablespoon with 8 oz of water every 2 hours for 3 doses. 1 tablespoon with 8 oz of water twice daily until stools are soft and regular. 1 tablespoon with 8 oz of water once daily for maintenance.  600 g 0    LEVEMIR U-100 INSULIN 100 unit/mL injection ADMINISTER 40 UNITS UNDER THE SKIN TWICE DAILY 20 mL 11    Insulin Needles, Disposable, (PEN NEEDLE, DIABETIC) 31 gauge x 1/4\" ndle Use twice daily for insulin injections 90 Pen Needle 2    Blood-Glucose Meter monitoring kit Test twice daily 1 Kit 0    SUMAtriptan (IMITREX) 50 mg tablet Take 1 at onset of migraine - may repeat 1 tab by mouth in 2 hrs if needed 10 Tab 11    cetirizine (ZYRTEC) 10 mg tablet Take 1 Tab by mouth daily. (Patient taking differently: Take 10 mg by mouth daily as needed.) 90 Tab 3    albuterol (PROVENTIL HFA, VENTOLIN HFA, PROAIR HFA) 90 mcg/actuation inhaler Take 2 puffs by inhalation every four (4) hours as needed for Wheezing. 1 Inhaler 0    levothyroxine (SYNTHROID) 100 mcg tablet TAKE 1 TABLET BY MOUTH DAILY BEFORE BREAKFAST FOR HYPOTHYROIDISM 30 Tab 0    glucose blood VI test strips (BLOOD GLUCOSE TEST) strip Use twice daily as directed, to match her glucometer 100 Strip 11     Allergies   Allergen Reactions    Vicodin [Hydrocodone-Acetaminophen] Shortness of Breath     Past Medical History:   Diagnosis Date    Asthma     Bronchitis     Cataract     Diabetes ([address]     Endocrine disease     hyperthyroidism    Endometriosis     Hypertension     Irregular bleeding     Migraine     Pelvic pain     Thyroid disease      Social History     Tobacco Use    Smoking status: Never Smoker    Smokeless tobacco: Never Used   Substance Use Topics    Alcohol use: Yes     Alcohol/week: 0.0 standard drinks     Comment: occasional       ROS    Constitutional: No apparent distress noted  General- negative for fever, chills or fatigue  Eyes- negative visual changes  CV- denies chest pain, palpitation  Pul: negative cough or SOB  GI: negative nausea, flank pain, diarrhea, constipation  Urinary:- No dysuria or polyuria  MS- + joint pain, + myalgia., + pain with ambulation  Neuro- + neuropathy  Skin- negative for rashes or lesions. Objective:   Vital Signs: (As obtained by patient/caregiver at home)  There were no vitals taken for this visit.      [INSTRUCTIONS:  \"[x]\" Indicates a positive item  \"[]\" Indicates a negative item  -- DELETE ALL ITEMS NOT EXAMINED]    Constitutional: [x] Appears well-developed and well-nourished [x] No apparent distress      [] Abnormal -     Mental status: [x] Alert and awake  [x] Oriented to person/place/time [x] Able to follow commands    [] Abnormal -     Eyes:   EOM    [x]  Normal    [] Abnormal -   Sclera  [x]  Normal    [] Abnormal -          Discharge [x]  None visible   [] Abnormal -     HENT: [x] Normocephalic, atraumatic  [] Abnormal -   [x] Mouth/Throat: Mucous membranes are moist    External Ears [x] Normal  [] Abnormal -    Neck: [x] No visualized mass [] Abnormal -     Pulmonary/Chest: [x] Respiratory effort normal   [x] No visualized signs of difficulty breathing or respiratory distress        [] Abnormal -      Musculoskeletal:   [x] Normal gait with no signs of ataxia         [x] Normal range of motion of neck        [] Abnormal - Bunion right foot, mild swelling note    Neurological:        [x] No Facial Asymmetry (Cranial nerve 7 motor function) (limited exam due to video visit)          [x] No gaze palsy        [] Abnormal -          Skin:        [x] No significant exanthematous lesions or discoloration noted on facial skin         [] Abnormal -            Psychiatric:       [x] Normal Affect [] Abnormal -        [x] No Hallucinations    Other pertinent observable physical exam findings:-        We discussed the expected course, resolution and complications of the diagnosis(es) in detail. Medication risks, benefits, costs, interactions, and alternatives were discussed as indicated. I advised her to contact the office if her condition worsens, changes or fails to improve as anticipated. She expressed understanding with the diagnosis(es) and plan. Ivory Cassidy is a 44 y.o. female being evaluated by a video visit encounter for concerns as above. A caregiver was present when appropriate.  Due to this being a TeleHealth encounter (During ERRFY-92 public health emergency), evaluation of the following organ systems was limited: Vitals/Constitutional/EENT/Resp/CV/GI//MS/Neuro/Skin/Heme-Lymph-Imm. Pursuant to the emergency declaration under the [facility], [facility] waiver authority and the [employer] and [employer] Act, this Virtual  Visit was conducted, with patient's (and/or legal guardian's) consent, to reduce the patient's risk of exposure to COVID-19 and provide necessary medical care. Services were provided through a video synchronous discussion virtually to substitute for in-person clinic visit. Patient and provider were located at their individual homes.         Adine Felty, NP

## 2020-04-04 DIAGNOSIS — E03.9 HYPOTHYROIDISM, UNSPECIFIED TYPE: ICD-10-CM

## 2020-04-04 RX ORDER — LEVOTHYROXINE SODIUM 100 UG/1
TABLET ORAL
Qty: 30 TAB | Refills: 0 | Status: SHIPPED | OUTPATIENT
Start: 2020-04-04 | End: 2020-09-13

## 2020-04-15 DIAGNOSIS — E11.9 TYPE 2 DIABETES MELLITUS WITHOUT COMPLICATION, WITH LONG-TERM CURRENT USE OF INSULIN (HCC): ICD-10-CM

## 2020-04-15 DIAGNOSIS — Z79.4 TYPE 2 DIABETES MELLITUS WITHOUT COMPLICATION, WITH LONG-TERM CURRENT USE OF INSULIN (HCC): ICD-10-CM

## 2020-04-15 NOTE — TELEPHONE ENCOUNTER
Requested Prescriptions     Pending Prescriptions Disp Refills    insulin aspart U-100 (NOVOLOG) 100 unit/mL (3 mL) inpn 5 Adjustable Dose Pre-filled Pen Syringe 3     Sig: <150 BS/0 units, 151-200/2 units, 201-250/4 units, 251-300/6 units, 301-350/8 units, >351-400/10 units and call provider

## 2020-04-17 RX ORDER — INSULIN ASPART 100 [IU]/ML
INJECTION, SOLUTION INTRAVENOUS; SUBCUTANEOUS
Qty: 5 ADJUSTABLE DOSE PRE-FILLED PEN SYRINGE | Refills: 3 | Status: SHIPPED | OUTPATIENT
Start: 2020-04-17 | End: 2021-05-21 | Stop reason: SDUPTHER

## 2020-04-28 ENCOUNTER — VIRTUAL VISIT (OUTPATIENT)
Dept: FAMILY MEDICINE CLINIC | Facility: CLINIC | Age: 50
End: 2020-04-28

## 2020-04-28 NOTE — PROGRESS NOTES
Steffany Allred is a 44 y.o. female who was seen by synchronous (real-time) audio-video technology on 4/28/2020. Consent: Steffany Allred, who was seen by synchronous (real-time) audio-video technology, and/or her healthcare decision maker, is aware that this patient-initiated, Telehealth encounter on 4/28/2020 is a billable service, with coverage as determined by her insurance carrier. She is aware that she may receive a bill and has provided verbal consent to proceed: {YES/NO/NA-Consent obtained within past 12 months:01537}. Assessment & Plan:  
{A/P PLUS DISPO EZDX:79081} {Total time-based coding - will disappear if no selections made (Optional):13445::\"I spent at least 23 minutes on this visit with this established patient. (06408)\":0} Subjective:  
Steffany Allred is a 44 y.o. female who was seen for No chief complaint on file. Prior to Admission medications Medication Sig Start Date End Date Taking? Authorizing Provider  
insulin aspart U-100 (NOVOLOG) 100 unit/mL (3 mL) inpn <150 BS/0 units, 151-200/2 units, 201-250/4 units, 251-300/6 units, 301-350/8 units, >351-400/10 units and call provider 4/17/20   Jeet Kulwinder, NP  
levothyroxine (SYNTHROID) 100 mcg tablet TAKE 1 TABLET BY MOUTH DAILY BEFORE BREAKFAST FOR HYPOTHYROIDISM 4/4/20   Jeet Kulwinder NP  
gabapentin (NEURONTIN) 300 mg capsule Take 1 Cap by mouth two (2) times a day. Max Daily Amount: 600 mg. 4/3/20   Jeet Kulwinder, NP  
flash glucose scanning reader (FreeStyle Sabrina 14 Day [place]) misc 1 Each by Does Not Apply route Before breakfast, lunch, dinner and at bedtime. 4/3/20   Jeet Templeton, NP  
flash glucose sensor (FreeStyle Sabrina 14 Day Sensor) kit 1 Each by Does Not Apply route Before breakfast, lunch, dinner and at bedtime.  4/3/20   Jeet Kulwinder, NP  
fluticasone propion-salmeterol (ADVAIR/WIXELA) 250-50 mcg/dose diskus inhaler Take 1 Puff by inhalation daily. 11/21/19   [facility], NP  
metFORMIN (GLUCOPHAGE) 1,000 mg tablet Take 1 Tab by mouth two (2) times daily (with meals). Indications: type 2 diabetes mellitus 11/21/19   [facility], NP  
atorvastatin (LIPITOR) 20 mg tablet Take 1 Tab by mouth daily. 11/21/19   [facility], NP  
lisinopril (PRINIVIL, ZESTRIL) 20 mg tablet Take 1 Tab by mouth daily. 11/21/19   [facility], NP  
acetaminophen (TYLENOL) 325 mg tablet Take 2 Tabs by mouth every six (6) hours as needed for Pain. 6/4/19   aBron Watkins PA-C  
polyethylene glycol (MIRALAX) 17 gram/dose powder 1 tablespoon with 8 oz of water every 2 hours for 3 doses. 1 tablespoon with 8 oz of water twice daily until stools are soft and regular. 1 tablespoon with 8 oz of water once daily for maintenance. 6/4/19   Baron Watkins PA-C  
LEVEMIR U-100 INSULIN 100 unit/mL injection ADMINISTER 40 UNITS UNDER THE SKIN TWICE DAILY 4/1/19   Dania Pretty MD  
Insulin Needles, Disposable, (PEN NEEDLE, DIABETIC) 31 gauge x 1/4\" ndle Use twice daily for insulin injections 8/13/18   Nai Adler MD  
Blood-Glucose Meter monitoring kit Test twice daily 2/16/18   Nai Adler MD  
glucose blood VI test strips (BLOOD GLUCOSE TEST) strip Use twice daily as directed, to match her glucometer 2/16/18   Nai Adler MD  
SUMAtriptan (IMITREX) 50 mg tablet Take 1 at onset of migraine - may repeat 1 tab by mouth in 2 hrs if needed 1/15/18   Nai Adler MD  
cetirizine (ZYRTEC) 10 mg tablet Take 1 Tab by mouth daily. Patient taking differently: Take 10 mg by mouth daily as needed. 3/2/15   Kt Males, NP  
albuterol (PROVENTIL HFA, VENTOLIN HFA, PROAIR HFA) 90 mcg/actuation inhaler Take 2 puffs by inhalation every four (4) hours as needed for Wheezing. 11/28/14   Bob Montelongo MD  
 
Allergies Allergen Reactions  Vicodin [Hydrocodone-Acetaminophen] Shortness of Breath {History SmartLink choices - disappears if left unselected (Optional):67507} ROS Constitutional: No apparent distress noted General- negative for fever, chills or fatigue Eyes- negative visual changes CV- denies chest pain, palpitation Pul: negative cough or SOB 
GI: negative nausea, flank pain, diarrhea, constipation Urinary:- No dysuria or polyuria MS- negative myalgia, negative joint pain Neuro- paresthesia right foot Skin- negative for rashes or lesions. Objective:  
Vital Signs: (As obtained by patient/caregiver at home) There were no vitals taken for this visit. [INSTRUCTIONS:  \"[x]\" Indicates a positive item  \"[]\" Indicates a negative item  -- DELETE ALL ITEMS NOT EXAMINED] Constitutional: [x] Appears well-developed and well-nourished [x] No apparent distress   
  [] Abnormal - Mental status: [x] Alert and awake  [x] Oriented to person/place/time [x] Able to follow commands   
[] Abnormal - Eyes:   EOM    [x]  Normal    [] Abnormal -  
Sclera  [x]  Normal    [] Abnormal - 
        Discharge [x]  None visible   [] Abnormal - HENT: [x] Normocephalic, atraumatic  [] Abnormal -  
[x] Mouth/Throat: Mucous membranes are moist 
 
External Ears [x] Normal  [] Abnormal - Neck: [x] No visualized mass [] Abnormal - Pulmonary/Chest: [x] Respiratory effort normal   [x] No visualized signs of difficulty breathing or respiratory distress 
      [] Abnormal - Musculoskeletal:   [x] Normal gait with no signs of ataxia [x] Normal range of motion of neck [] Abnormal -  
 
Neurological:        [x] No Facial Asymmetry (Cranial nerve 7 motor function) (limited exam due to video visit)      [x] No gaze palsy  
     [] Abnormal -   
     
Skin:        [x] No significant exanthematous lesions or discoloration noted on facial skin   
     [] Abnormal -  
        
 Psychiatric:       [x] Normal Affect [] Abnormal -  
     [x] No Hallucinations Other pertinent observable physical exam findings:- 
 
 
 
We discussed the expected course, resolution and complications of the diagnosis(es) in detail. Medication risks, benefits, costs, interactions, and alternatives were discussed as indicated. I advised her to contact the office if her condition worsens, changes or fails to improve as anticipated. She expressed understanding with the diagnosis(es) and plan. Wanda Gao is a 44 y.o. female who was evaluated by a video visit encounter for concerns as above. Patient identification was verified prior to start of the visit. A caregiver was present when appropriate. Due to this being a TeleHealth encounter (During [facility] public health emergency), evaluation of the following organ systems was limited: Vitals/Constitutional/EENT/Resp/CV/GI//MS/Neuro/Skin/Heme-Lymph-Imm. Pursuant to the emergency declaration under the [facility], [facility] waiver authority and the [employer] and [employer] Act, this Virtual  Visit was conducted, with patient's (and/or legal guardian's) consent, to reduce the patient's risk of exposure to COVID-19 and provide necessary medical care. Services were provided through a video synchronous discussion virtually to substitute for in-person clinic visit. Patient and provider were located at their individual homes.  
 
 
Sirena Marino NP

## 2020-05-21 ENCOUNTER — TELEPHONE (OUTPATIENT)
Dept: FAMILY MEDICINE CLINIC | Facility: CLINIC | Age: 50
End: 2020-05-21

## 2020-05-21 DIAGNOSIS — E11.9 TYPE 2 DIABETES MELLITUS WITHOUT COMPLICATION, WITH LONG-TERM CURRENT USE OF INSULIN (HCC): ICD-10-CM

## 2020-05-21 DIAGNOSIS — Z79.4 TYPE 2 DIABETES MELLITUS WITHOUT COMPLICATION, WITH LONG-TERM CURRENT USE OF INSULIN (HCC): ICD-10-CM

## 2020-05-21 DIAGNOSIS — G62.9 NEUROPATHY: ICD-10-CM

## 2020-05-21 NOTE — TELEPHONE ENCOUNTER
Requested Prescriptions     Pending Prescriptions Disp Refills    gabapentin (NEURONTIN) 300 mg capsule 60 Cap 0     Sig: Take 1 Cap by mouth two (2) times a day. Max Daily Amount: 600 mg.

## 2020-05-22 RX ORDER — GABAPENTIN 300 MG/1
300 CAPSULE ORAL 2 TIMES DAILY
Qty: 180 CAP | Refills: 1 | Status: SHIPPED | OUTPATIENT
Start: 2020-05-22 | End: 2020-08-08

## 2020-06-03 DIAGNOSIS — E11.9 TYPE 2 DIABETES MELLITUS WITHOUT COMPLICATION, WITH LONG-TERM CURRENT USE OF INSULIN (HCC): ICD-10-CM

## 2020-06-03 DIAGNOSIS — Z79.4 TYPE 2 DIABETES MELLITUS WITHOUT COMPLICATION, WITH LONG-TERM CURRENT USE OF INSULIN (HCC): ICD-10-CM

## 2020-06-06 RX ORDER — INSULIN DETEMIR 100 [IU]/ML
INJECTION, SOLUTION SUBCUTANEOUS
Qty: 20 ML | Refills: 11 | Status: SHIPPED | OUTPATIENT
Start: 2020-06-06 | End: 2022-03-02 | Stop reason: SDUPTHER

## 2020-06-09 ENCOUNTER — TELEPHONE (OUTPATIENT)
Dept: FAMILY MEDICINE CLINIC | Facility: CLINIC | Age: 50
End: 2020-06-09

## 2020-06-09 NOTE — TELEPHONE ENCOUNTER
prior Armand Stade has been filled out  For the Novolog insulin and waiting for the provider to sign and fax.

## 2020-06-09 NOTE — TELEPHONE ENCOUNTER
Received a call from [address] [place] [address] regarding the patients Novolog. [address] [place] [address] called to see if it was for the Brand name and she stated if for the generic it pays for it . We were requestion the generic. I then called the pharmacy and the pharmisist stated she just ran it through again and it does not . I will send in another PA to [facility] as soon as the provider signs the form.

## 2020-07-30 DIAGNOSIS — Z20.822 SUSPECTED COVID-19 VIRUS INFECTION: Primary | ICD-10-CM

## 2020-07-31 DIAGNOSIS — Z79.4 TYPE 2 DIABETES MELLITUS WITHOUT COMPLICATION, WITH LONG-TERM CURRENT USE OF INSULIN (HCC): ICD-10-CM

## 2020-07-31 DIAGNOSIS — E11.9 TYPE 2 DIABETES MELLITUS WITHOUT COMPLICATION, WITH LONG-TERM CURRENT USE OF INSULIN (HCC): ICD-10-CM

## 2020-07-31 DIAGNOSIS — G62.9 NEUROPATHY: ICD-10-CM

## 2020-08-08 RX ORDER — GABAPENTIN 300 MG/1
CAPSULE ORAL
Qty: 60 CAP | Refills: 2 | Status: SHIPPED | OUTPATIENT
Start: 2020-08-08 | End: 2021-05-21 | Stop reason: SDUPTHER

## 2020-09-26 DIAGNOSIS — Z79.4 TYPE 2 DIABETES MELLITUS WITHOUT COMPLICATION, WITH LONG-TERM CURRENT USE OF INSULIN (HCC): ICD-10-CM

## 2020-09-26 DIAGNOSIS — E11.9 TYPE 2 DIABETES MELLITUS WITHOUT COMPLICATION, WITH LONG-TERM CURRENT USE OF INSULIN (HCC): ICD-10-CM

## 2020-09-30 RX ORDER — FLASH GLUCOSE SENSOR
KIT MISCELLANEOUS
Qty: 5 KIT | Refills: 2 | Status: SHIPPED | OUTPATIENT
Start: 2020-09-30 | End: 2021-11-17

## 2020-10-29 DIAGNOSIS — E03.9 HYPOTHYROIDISM, UNSPECIFIED TYPE: ICD-10-CM

## 2020-10-30 RX ORDER — LEVOTHYROXINE SODIUM 100 UG/1
TABLET ORAL
Qty: 90 TAB | Refills: 0 | Status: SHIPPED | OUTPATIENT
Start: 2020-10-30 | End: 2021-11-17

## 2020-11-01 ENCOUNTER — HOSPITAL ENCOUNTER (INPATIENT)
Age: 50
LOS: 1 days | Discharge: HOME | DRG: 069 | End: 2020-11-02
Attending: EMERGENCY MEDICINE | Admitting: HOSPITALIST
Payer: COMMERCIAL

## 2020-11-01 DIAGNOSIS — G43.019 INTRACTABLE MIGRAINE WITHOUT AURA AND WITHOUT STATUS MIGRAINOSUS: ICD-10-CM

## 2020-11-01 DIAGNOSIS — R20.0 LEFT SIDED NUMBNESS: Primary | ICD-10-CM

## 2020-11-01 DIAGNOSIS — Z79.4 TYPE 2 DIABETES MELLITUS WITHOUT COMPLICATION, WITH LONG-TERM CURRENT USE OF INSULIN (HCC): ICD-10-CM

## 2020-11-01 DIAGNOSIS — I10 ESSENTIAL HYPERTENSION: ICD-10-CM

## 2020-11-01 DIAGNOSIS — E11.9 TYPE 2 DIABETES MELLITUS WITHOUT COMPLICATION, WITH LONG-TERM CURRENT USE OF INSULIN (HCC): ICD-10-CM

## 2020-11-01 LAB
BASOPHILS # BLD: 0 K/UL (ref 0–0.1)
BASOPHILS NFR BLD: 0 % (ref 0–2)
DIFFERENTIAL METHOD BLD: NORMAL
EOSINOPHIL # BLD: 0.2 K/UL (ref 0–0.4)
EOSINOPHIL NFR BLD: 4 % (ref 0–5)
ERYTHROCYTE [DISTWIDTH] IN BLOOD BY AUTOMATED COUNT: 12.6 % (ref 11.6–14.5)
HCT VFR BLD AUTO: 37.2 % (ref 35–45)
HGB BLD-MCNC: 12.1 G/DL (ref 12–16)
LYMPHOCYTES # BLD: 2.3 K/UL (ref 0.9–3.6)
LYMPHOCYTES NFR BLD: 42 % (ref 21–52)
MCH RBC QN AUTO: 25.3 PG (ref 24–34)
MCHC RBC AUTO-ENTMCNC: 32.5 G/DL (ref 31–37)
MCV RBC AUTO: 77.8 FL (ref 74–97)
MONOCYTES # BLD: 0.4 K/UL (ref 0.05–1.2)
MONOCYTES NFR BLD: 8 % (ref 3–10)
NEUTS SEG # BLD: 2.5 K/UL (ref 1.8–8)
NEUTS SEG NFR BLD: 46 % (ref 40–73)
PLATELET # BLD AUTO: 232 K/UL (ref 135–420)
PMV BLD AUTO: 10.5 FL (ref 9.2–11.8)
RBC # BLD AUTO: 4.78 M/UL (ref 4.2–5.3)
WBC # BLD AUTO: 5.4 K/UL (ref 4.6–13.2)

## 2020-11-01 PROCEDURE — 85025 COMPLETE CBC W/AUTO DIFF WBC: CPT

## 2020-11-01 PROCEDURE — 83735 ASSAY OF MAGNESIUM: CPT

## 2020-11-01 PROCEDURE — 83036 HEMOGLOBIN GLYCOSYLATED A1C: CPT

## 2020-11-01 PROCEDURE — 80048 BASIC METABOLIC PNL TOTAL CA: CPT

## 2020-11-01 PROCEDURE — 82550 ASSAY OF CK (CPK): CPT

## 2020-11-01 PROCEDURE — 99285 EMERGENCY DEPT VISIT HI MDM: CPT

## 2020-11-01 PROCEDURE — 80076 HEPATIC FUNCTION PANEL: CPT

## 2020-11-01 PROCEDURE — 93005 ELECTROCARDIOGRAM TRACING: CPT

## 2020-11-01 NOTE — LETTER
NOTIFICATION RETURN TO WORK  
 
11/2/2020 4:27 PM 
 
Ms. Radha Corley 
[address] [place] [address] To Whom It May Concern: 
 
Radha Corley is currently under the care of [address] [place] [address]. She is able to return to work on 11/9/2020. Radha Corley may return to work with no restrictions. If there are questions or concerns please have the patient contact our office. Sincerely, Manoj Santana PA-C [address] [place] [address] [phone]

## 2020-11-02 ENCOUNTER — APPOINTMENT (OUTPATIENT)
Dept: GENERAL RADIOLOGY | Age: 50
DRG: 069 | End: 2020-11-02
Attending: EMERGENCY MEDICINE
Payer: COMMERCIAL

## 2020-11-02 ENCOUNTER — APPOINTMENT (OUTPATIENT)
Dept: MRI IMAGING | Age: 50
DRG: 069 | End: 2020-11-02
Attending: HOSPITALIST
Payer: COMMERCIAL

## 2020-11-02 ENCOUNTER — APPOINTMENT (OUTPATIENT)
Dept: CT IMAGING | Age: 50
DRG: 069 | End: 2020-11-02
Attending: EMERGENCY MEDICINE
Payer: COMMERCIAL

## 2020-11-02 VITALS
DIASTOLIC BLOOD PRESSURE: 94 MMHG | WEIGHT: 163 LBS | TEMPERATURE: 97.4 F | RESPIRATION RATE: 18 BRPM | HEIGHT: 66 IN | HEART RATE: 83 BPM | OXYGEN SATURATION: 99 % | BODY MASS INDEX: 26.2 KG/M2 | SYSTOLIC BLOOD PRESSURE: 168 MMHG

## 2020-11-02 PROBLEM — R20.0 LEFT SIDED NUMBNESS: Status: ACTIVE | Noted: 2020-11-02

## 2020-11-02 LAB
ALBUMIN SERPL-MCNC: 3.6 G/DL (ref 3.4–5)
ALBUMIN/GLOB SERPL: 0.9 {RATIO} (ref 0.8–1.7)
ALP SERPL-CCNC: 124 U/L (ref 45–117)
ALT SERPL-CCNC: 26 U/L (ref 13–56)
ANION GAP SERPL CALC-SCNC: 4 MMOL/L (ref 3–18)
APPEARANCE UR: CLEAR
AST SERPL-CCNC: 37 U/L (ref 10–38)
ATRIAL RATE: 95 BPM
BACTERIA URNS QL MICRO: ABNORMAL /HPF
BILIRUB DIRECT SERPL-MCNC: 0.2 MG/DL (ref 0–0.2)
BILIRUB SERPL-MCNC: 0.6 MG/DL (ref 0.2–1)
BILIRUB UR QL: NEGATIVE
BUN SERPL-MCNC: 11 MG/DL (ref 7–18)
BUN/CREAT SERPL: 13 (ref 12–20)
CALCIUM SERPL-MCNC: 9 MG/DL (ref 8.5–10.1)
CALCULATED P AXIS, ECG09: 51 DEGREES
CALCULATED R AXIS, ECG10: 21 DEGREES
CALCULATED T AXIS, ECG11: 47 DEGREES
CHLORIDE SERPL-SCNC: 107 MMOL/L (ref 100–111)
CK MB CFR SERPL CALC: 1.1 % (ref 0–4)
CK MB SERPL-MCNC: 2.2 NG/ML (ref 5–25)
CK SERPL-CCNC: 197 U/L (ref 26–192)
CO2 SERPL-SCNC: 31 MMOL/L (ref 21–32)
COLOR UR: YELLOW
CREAT SERPL-MCNC: 0.87 MG/DL (ref 0.6–1.3)
DIAGNOSIS, 93000: NORMAL
EPITH CASTS URNS QL MICRO: ABNORMAL /LPF (ref 0–5)
EST. AVERAGE GLUCOSE BLD GHB EST-MCNC: 240 MG/DL
GLOBULIN SER CALC-MCNC: 3.9 G/DL (ref 2–4)
GLUCOSE BLD STRIP.AUTO-MCNC: 232 MG/DL (ref 70–110)
GLUCOSE BLD STRIP.AUTO-MCNC: 236 MG/DL (ref 70–110)
GLUCOSE BLD STRIP.AUTO-MCNC: 254 MG/DL (ref 70–110)
GLUCOSE SERPL-MCNC: 244 MG/DL (ref 74–99)
GLUCOSE UR STRIP.AUTO-MCNC: >1000 MG/DL
HBA1C MFR BLD: 10 % (ref 4.2–5.6)
HGB UR QL STRIP: NEGATIVE
KETONES UR QL STRIP.AUTO: NEGATIVE MG/DL
LEUKOCYTE ESTERASE UR QL STRIP.AUTO: ABNORMAL
MAGNESIUM SERPL-MCNC: 1.5 MG/DL (ref 1.6–2.6)
NITRITE UR QL STRIP.AUTO: POSITIVE
P-R INTERVAL, ECG05: 122 MS
PH UR STRIP: 6.5 [PH] (ref 5–8)
POTASSIUM SERPL-SCNC: 3.7 MMOL/L (ref 3.5–5.5)
PROT SERPL-MCNC: 7.5 G/DL (ref 6.4–8.2)
PROT UR STRIP-MCNC: NEGATIVE MG/DL
Q-T INTERVAL, ECG07: 384 MS
QRS DURATION, ECG06: 88 MS
QTC CALCULATION (BEZET), ECG08: 482 MS
RBC #/AREA URNS HPF: ABNORMAL /HPF (ref 0–5)
SODIUM SERPL-SCNC: 142 MMOL/L (ref 136–145)
SP GR UR REFRACTOMETRY: 1.02 (ref 1–1.03)
TROPONIN I SERPL-MCNC: <0.02 NG/ML (ref 0–0.04)
UROBILINOGEN UR QL STRIP.AUTO: 1 EU/DL (ref 0.2–1)
VENTRICULAR RATE, ECG03: 95 BPM
WBC URNS QL MICRO: ABNORMAL /HPF (ref 0–5)

## 2020-11-02 PROCEDURE — 65660000000 HC RM CCU STEPDOWN

## 2020-11-02 PROCEDURE — 74011250637 HC RX REV CODE- 250/637: Performed by: EMERGENCY MEDICINE

## 2020-11-02 PROCEDURE — 74011250636 HC RX REV CODE- 250/636: Performed by: INTERNAL MEDICINE

## 2020-11-02 PROCEDURE — 70551 MRI BRAIN STEM W/O DYE: CPT

## 2020-11-02 PROCEDURE — 90471 IMMUNIZATION ADMIN: CPT

## 2020-11-02 PROCEDURE — 74011636637 HC RX REV CODE- 636/637: Performed by: HOSPITALIST

## 2020-11-02 PROCEDURE — 74011250637 HC RX REV CODE- 250/637: Performed by: INTERNAL MEDICINE

## 2020-11-02 PROCEDURE — 99223 1ST HOSP IP/OBS HIGH 75: CPT | Performed by: HOSPITALIST

## 2020-11-02 PROCEDURE — 97162 PT EVAL MOD COMPLEX 30 MIN: CPT

## 2020-11-02 PROCEDURE — 99217 PR OBSERVATION CARE DISCHARGE MANAGEMENT: CPT | Performed by: INTERNAL MEDICINE

## 2020-11-02 PROCEDURE — 70496 CT ANGIOGRAPHY HEAD: CPT

## 2020-11-02 PROCEDURE — 92610 EVALUATE SWALLOWING FUNCTION: CPT

## 2020-11-02 PROCEDURE — 92522 EVALUATE SPEECH PRODUCTION: CPT

## 2020-11-02 PROCEDURE — 74011000636 HC RX REV CODE- 636: Performed by: HOSPITALIST

## 2020-11-02 PROCEDURE — 71045 X-RAY EXAM CHEST 1 VIEW: CPT

## 2020-11-02 PROCEDURE — 99233 SBSQ HOSP IP/OBS HIGH 50: CPT | Performed by: STUDENT IN AN ORGANIZED HEALTH CARE EDUCATION/TRAINING PROGRAM

## 2020-11-02 PROCEDURE — 74011636637 HC RX REV CODE- 636/637: Performed by: INTERNAL MEDICINE

## 2020-11-02 PROCEDURE — 82962 GLUCOSE BLOOD TEST: CPT

## 2020-11-02 PROCEDURE — 97535 SELF CARE MNGMENT TRAINING: CPT

## 2020-11-02 PROCEDURE — 81001 URINALYSIS AUTO W/SCOPE: CPT

## 2020-11-02 PROCEDURE — 74011250637 HC RX REV CODE- 250/637: Performed by: HOSPITALIST

## 2020-11-02 PROCEDURE — 97165 OT EVAL LOW COMPLEX 30 MIN: CPT

## 2020-11-02 PROCEDURE — 92526 ORAL FUNCTION THERAPY: CPT

## 2020-11-02 PROCEDURE — 74011250636 HC RX REV CODE- 250/636: Performed by: HOSPITALIST

## 2020-11-02 PROCEDURE — 70450 CT HEAD/BRAIN W/O DYE: CPT

## 2020-11-02 PROCEDURE — 90686 IIV4 VACC NO PRSV 0.5 ML IM: CPT | Performed by: INTERNAL MEDICINE

## 2020-11-02 RX ORDER — GABAPENTIN 100 MG/1
100 CAPSULE ORAL 3 TIMES DAILY
Qty: 42 CAP | Refills: 0 | Status: SHIPPED | OUTPATIENT
Start: 2020-11-02 | End: 2021-05-21 | Stop reason: DRUGHIGH

## 2020-11-02 RX ORDER — CLOPIDOGREL BISULFATE 75 MG/1
75 TABLET ORAL DAILY
Status: DISCONTINUED | OUTPATIENT
Start: 2020-11-02 | End: 2020-11-02 | Stop reason: HOSPADM

## 2020-11-02 RX ORDER — MAGNESIUM SULFATE HEPTAHYDRATE 40 MG/ML
2 INJECTION, SOLUTION INTRAVENOUS ONCE
Status: COMPLETED | OUTPATIENT
Start: 2020-11-02 | End: 2020-11-02

## 2020-11-02 RX ORDER — LEVOTHYROXINE SODIUM 100 UG/1
100 TABLET ORAL
Status: DISCONTINUED | OUTPATIENT
Start: 2020-11-02 | End: 2020-11-02 | Stop reason: HOSPADM

## 2020-11-02 RX ORDER — ACETAMINOPHEN 500 MG
500 TABLET ORAL
Status: DISCONTINUED | OUTPATIENT
Start: 2020-11-02 | End: 2020-11-02 | Stop reason: HOSPADM

## 2020-11-02 RX ORDER — KETOROLAC TROMETHAMINE 15 MG/ML
15 INJECTION, SOLUTION INTRAMUSCULAR; INTRAVENOUS EVERY 6 HOURS
Status: DISCONTINUED | OUTPATIENT
Start: 2020-11-02 | End: 2020-11-02 | Stop reason: HOSPADM

## 2020-11-02 RX ORDER — GUAIFENESIN 100 MG/5ML
324 LIQUID (ML) ORAL
Status: COMPLETED | OUTPATIENT
Start: 2020-11-02 | End: 2020-11-02

## 2020-11-02 RX ORDER — ENOXAPARIN SODIUM 100 MG/ML
40 INJECTION SUBCUTANEOUS EVERY 24 HOURS
Status: DISCONTINUED | OUTPATIENT
Start: 2020-11-02 | End: 2020-11-02

## 2020-11-02 RX ORDER — INSULIN LISPRO 100 [IU]/ML
INJECTION, SOLUTION INTRAVENOUS; SUBCUTANEOUS
Status: DISCONTINUED | OUTPATIENT
Start: 2020-11-02 | End: 2020-11-02

## 2020-11-02 RX ORDER — BUDESONIDE AND FORMOTEROL FUMARATE DIHYDRATE 80; 4.5 UG/1; UG/1
2 AEROSOL RESPIRATORY (INHALATION)
Status: DISCONTINUED | OUTPATIENT
Start: 2020-11-02 | End: 2020-11-02 | Stop reason: HOSPADM

## 2020-11-02 RX ORDER — GABAPENTIN 100 MG/1
200 CAPSULE ORAL 2 TIMES DAILY
Status: DISCONTINUED | OUTPATIENT
Start: 2020-11-02 | End: 2020-11-02 | Stop reason: HOSPADM

## 2020-11-02 RX ORDER — SUMATRIPTAN 25 MG/1
50 TABLET, FILM COATED ORAL
Status: DISCONTINUED | OUTPATIENT
Start: 2020-11-02 | End: 2020-11-02

## 2020-11-02 RX ORDER — SULFAMETHOXAZOLE AND TRIMETHOPRIM 800; 160 MG/1; MG/1
1 TABLET ORAL EVERY 12 HOURS
Status: DISCONTINUED | OUTPATIENT
Start: 2020-11-02 | End: 2020-11-02 | Stop reason: HOSPADM

## 2020-11-02 RX ORDER — ASPIRIN 325 MG
325 TABLET ORAL DAILY
Status: DISCONTINUED | OUTPATIENT
Start: 2020-11-03 | End: 2020-11-02 | Stop reason: HOSPADM

## 2020-11-02 RX ORDER — LISINOPRIL 20 MG/1
20 TABLET ORAL DAILY
Status: DISCONTINUED | OUTPATIENT
Start: 2020-11-02 | End: 2020-11-02 | Stop reason: HOSPADM

## 2020-11-02 RX ORDER — LANOLIN ALCOHOL/MO/W.PET/CERES
400 CREAM (GRAM) TOPICAL DAILY
Status: DISCONTINUED | OUTPATIENT
Start: 2020-11-03 | End: 2020-11-02 | Stop reason: HOSPADM

## 2020-11-02 RX ORDER — ONDANSETRON 2 MG/ML
4 INJECTION INTRAMUSCULAR; INTRAVENOUS
Status: DISCONTINUED | OUTPATIENT
Start: 2020-11-02 | End: 2020-11-02 | Stop reason: HOSPADM

## 2020-11-02 RX ORDER — SULFAMETHOXAZOLE AND TRIMETHOPRIM 800; 160 MG/1; MG/1
1 TABLET ORAL EVERY 12 HOURS
Qty: 5 TAB | Refills: 0 | Status: SHIPPED | OUTPATIENT
Start: 2020-11-02 | End: 2021-05-21 | Stop reason: ALTCHOICE

## 2020-11-02 RX ORDER — INSULIN LISPRO 100 [IU]/ML
INJECTION, SOLUTION INTRAVENOUS; SUBCUTANEOUS
Status: DISCONTINUED | OUTPATIENT
Start: 2020-11-02 | End: 2020-11-02 | Stop reason: HOSPADM

## 2020-11-02 RX ORDER — ATORVASTATIN CALCIUM 80 MG/1
80 TABLET, FILM COATED ORAL
Qty: 30 TAB | Refills: 0 | Status: SHIPPED | OUTPATIENT
Start: 2020-11-02 | End: 2022-03-02 | Stop reason: SDUPTHER

## 2020-11-02 RX ORDER — INSULIN GLARGINE 100 [IU]/ML
25 INJECTION, SOLUTION SUBCUTANEOUS DAILY
Status: DISCONTINUED | OUTPATIENT
Start: 2020-11-02 | End: 2020-11-02 | Stop reason: HOSPADM

## 2020-11-02 RX ORDER — LABETALOL HCL 20 MG/4 ML
5 SYRINGE (ML) INTRAVENOUS
Status: DISCONTINUED | OUTPATIENT
Start: 2020-11-02 | End: 2020-11-02 | Stop reason: HOSPADM

## 2020-11-02 RX ORDER — LEVOTHYROXINE SODIUM 100 UG/1
100 TABLET ORAL
Status: DISCONTINUED | OUTPATIENT
Start: 2020-11-03 | End: 2020-11-02

## 2020-11-02 RX ORDER — ENOXAPARIN SODIUM 100 MG/ML
40 INJECTION SUBCUTANEOUS EVERY 24 HOURS
Status: DISCONTINUED | OUTPATIENT
Start: 2020-11-02 | End: 2020-11-02 | Stop reason: HOSPADM

## 2020-11-02 RX ORDER — ASPIRIN 325 MG
325 TABLET ORAL DAILY
Qty: 30 TAB | Refills: 0 | Status: SHIPPED | OUTPATIENT
Start: 2020-11-03

## 2020-11-02 RX ORDER — GABAPENTIN 100 MG/1
100 CAPSULE ORAL 3 TIMES DAILY
Qty: 90 CAP | Refills: 0 | Status: SHIPPED | OUTPATIENT
Start: 2020-11-02 | End: 2020-11-02

## 2020-11-02 RX ORDER — ATORVASTATIN CALCIUM 40 MG/1
80 TABLET, FILM COATED ORAL
Status: DISCONTINUED | OUTPATIENT
Start: 2020-11-02 | End: 2020-11-02 | Stop reason: HOSPADM

## 2020-11-02 RX ADMIN — INFLUENZA VIRUS VACCINE 0.5 ML: 15; 15; 15; 15 SUSPENSION INTRAMUSCULAR at 17:43

## 2020-11-02 RX ADMIN — GABAPENTIN 200 MG: 100 CAPSULE ORAL at 17:42

## 2020-11-02 RX ADMIN — MAGNESIUM SULFATE HEPTAHYDRATE 2 G: 40 INJECTION, SOLUTION INTRAVENOUS at 05:38

## 2020-11-02 RX ADMIN — SULFAMETHOXAZOLE AND TRIMETHOPRIM 1 TABLET: 800; 160 TABLET ORAL at 11:33

## 2020-11-02 RX ADMIN — KETOROLAC TROMETHAMINE 15 MG: 15 INJECTION, SOLUTION INTRAMUSCULAR; INTRAVENOUS at 11:24

## 2020-11-02 RX ADMIN — KETOROLAC TROMETHAMINE 15 MG: 15 INJECTION, SOLUTION INTRAMUSCULAR; INTRAVENOUS at 17:42

## 2020-11-02 RX ADMIN — SULFAMETHOXAZOLE AND TRIMETHOPRIM 1 TABLET: 800; 160 TABLET ORAL at 17:44

## 2020-11-02 RX ADMIN — LEVOTHYROXINE SODIUM 100 MCG: 100 TABLET ORAL at 05:38

## 2020-11-02 RX ADMIN — GABAPENTIN 200 MG: 100 CAPSULE ORAL at 08:19

## 2020-11-02 RX ADMIN — INSULIN LISPRO 6 UNITS: 100 INJECTION, SOLUTION INTRAVENOUS; SUBCUTANEOUS at 12:15

## 2020-11-02 RX ADMIN — ASPIRIN 324 MG: 81 TABLET, CHEWABLE ORAL at 03:19

## 2020-11-02 RX ADMIN — CLOPIDOGREL BISULFATE 75 MG: 75 TABLET ORAL at 08:19

## 2020-11-02 RX ADMIN — IOPAMIDOL 100 ML: 755 INJECTION, SOLUTION INTRAVENOUS at 03:08

## 2020-11-02 RX ADMIN — ENOXAPARIN SODIUM 40 MG: 40 INJECTION SUBCUTANEOUS at 08:19

## 2020-11-02 RX ADMIN — INSULIN GLARGINE 25 UNITS: 100 INJECTION, SOLUTION SUBCUTANEOUS at 08:19

## 2020-11-02 NOTE — PROGRESS NOTES
Problem: Mobility Impaired (Adult and Pediatric)  Goal: *Acute Goals and Plan of Care (Insert Text)  Description: Physical Therapy Goals  Initiated 11/2/2020 and to be accomplished within 7 day(s)  1. Patient will move from supine to sit and sit to supine , scoot up and down, and roll side to side in bed with independence. 2.  Patient will transfer from bed to chair and chair to bed with modified independence using the least restrictive device. 3.  Patient will perform sit to stand with modified independence. 4.  Patient will ambulate with modified independence for 100 feet with the least restrictive device. 5.  Patient will ascend/descend 15 stairs with 1 handrail(s) with supervision/set-up. PLOF: Pt was ind PTA, no AD needed, lives in a two story home with 3-4 PAMELA, works full time as a      Outcome: Progressing Towards Goal     PHYSICAL THERAPY EVALUATION    Patient: Dutch Dudley (83 y.o. female)  Date: 11/2/2020  Primary Diagnosis: Left sided numbness [R20.0]        Precautions:      WBAT  PLOF: see above     ASSESSMENT :  Based on the objective data described below, the patient presents with L sided numbness and tingling with decreased strength noted to the LLE. Pt presents with 3+ to 4-/5 LLE strength but still functional with decreased light touch sensation to the distal LLE. Pt received in bed in NAD, agreeable to evaluation and seen with OT to maximize functional mobility and safety. Pt is able to perform bed mobility with mod ind and upon standing in prep for mobilizing to the bathroom, pt had one LOB 2/2 L knee buckling with CGA for recovery, pt otherwise able to ambulate within room at [facility] for safety, no AD required. Pt reports ability to feel her foot on the floor when upright with decreased sensation through her heel compared to forefoot.  Pt ended session sitting up in transport chair with team to be taken for test. Will continue to follow in the acute setting to maximize functional mobility and safety. Recommend return home with [facility] and family support, nursing informed. Patient will benefit from skilled intervention to address the above impairments. Patient's rehabilitation potential is considered to be Good  Factors which may influence rehabilitation potential include:   [x]         None noted  []         Mental ability/status  []         Medical condition  []         Home/family situation and support systems  []         Safety awareness  []         Pain tolerance/management  []         Other:      PLAN :  Recommendations and Planned Interventions:   [x]           Bed Mobility Training             [x]    Neuromuscular Re-Education  [x]           Transfer Training                   []    Orthotic/Prosthetic Training  [x]           Gait Training                          []    Modalities  [x]           Therapeutic Exercises           []    Edema Management/Control  [x]           Therapeutic Activities            [x]    Family Training/Education  [x]           Patient Education  []           Other (comment):    Frequency/Duration: Patient will be followed by physical therapy 1-2 times per day/4-7 days per week to address goals.   Discharge Recommendations: Home Health with family support as needed   Further Equipment Recommendations for Discharge: N/A     SUBJECTIVE:   Patient stated Sari Virk had no idea all of this was going on.    OBJECTIVE DATA SUMMARY:     Past Medical History:   Diagnosis Date    Asthma     Bronchitis     Cataract     Diabetes ([facility] [address]     Endocrine disease     hyperthyroidism    Endometriosis     Hypertension     Irregular bleeding     Migraine     Pelvic pain     Thyroid disease      Past Surgical History:   Procedure Laterality Date    HX  SECTION      x 3    HX COLONOSCOPY      HX GYN      partial hysterectomy    HX GYN      cervical laparoscopy; endometriosis    HX HYSTERECTOMY       Barriers to Learning/Limitations: None  Compensate with: N/A  Home Situation:  Home Situation  Home Environment: Private residence  # Steps to Enter: 4  One/Two Story Residence: Two story  # of Interior Steps: 13  Interior Rails: Both  Living Alone: No  Support Systems: Family member(s)  Patient Expects to be Discharged to[de-identified] Private residence  Current DME Used/Available at Home: None  Critical Behavior:  Neurologic State: Alert  Orientation Level: Oriented X4  Cognition: Follows commands  Safety/Judgement: Fall prevention  Psychosocial  Patient Behaviors: Calm; Cooperative  Purposeful Interaction: Yes  Pt Identified Daily Priority: Clinical issues (comment)  Caritas Process: Nurture loving kindness;Establish trust;Teaching/learning; Attend basic human needs  Caring Interventions: Reassure; Therapeutic modalities  Reassure: Therapeutic listening; Informing;Caring rounds  Therapeutic Modalities: Humor; Intentional therapeutic touch                 Strength:    Strength: Generally decreased, functional(L sided weakness- grossly 3+ PROXIMAL, 4- DISTAL, R WFL )    Tone & Sensation:   Tone: Normal    Sensation: Impaired(decreased light touch sensation below knee to LLE, RLE WFL )    Range Of Motion:  AROM: Within functional limits       Functional Mobility:  Bed Mobility:     Supine to Sit: Modified independent  Sit to Supine: Modified independent  Scooting: Modified independent  Transfers:  Sit to Stand: Stand-by assistance  Stand to Sit: Stand-by assistance    Balance:   Sitting: Intact  Standing: Impaired; Without support    Ambulation/Gait Training:  Distance (ft): 25 Feet (ft)  Assistive Device: Other (comment)(none)  Ambulation - Level of Assistance: Stand-by assistance    Base of Support: Widened     Speed/Fiorella: Pace decreased (<100 feet/min)  Step Length: Right shortened;Left shortened    Pain:  Pain level pre-treatment: 0/10   Pain level post-treatment: 0/10   Does not endorse pain    Activity Tolerance:   Good    Please refer to the flowsheet for vital signs taken during this treatment. After treatment:   []         Patient left in no apparent distress sitting up in chair  [x]         Patient left in no apparent distress sitting on transport chair leaving for test- nursing aware  []         Call bell left within reach  []         Nursing notified  []         Caregiver present  []         Bed alarm activated  []         SCDs applied    COMMUNICATION/EDUCATION:   [x]         Role of Physical Therapy in the acute care setting. [x]         Fall prevention education was provided and the patient/caregiver indicated understanding. [x]         Patient/family have participated as able in goal setting and plan of care. [x]         Patient/family agree to work toward stated goals and plan of care. []         Patient understands intent and goals of therapy, but is neutral about his/her participation. []         Patient is unable to participate in goal setting/plan of care: ongoing with therapy staff.  []         Other:     Thank you for this referral.  Leila Childs   Time Calculation: 15 mins      Eval Complexity: History: MEDIUM  Complexity : 1-2 comorbidities / personal factors will impact the outcome/ POC Exam:MEDIUM Complexity : 3 Standardized tests and measures addressing body structure, function, activity limitation and / or participation in recreation  Presentation: MEDIUM Complexity : Evolving with changing characteristics  Clinical Decision Making:Medium Complexity    Overall Complexity:MEDIUM

## 2020-11-02 NOTE — DIABETES MGMT
Diabetes Patient/Family Education Record  Factors That  May Influence Patients Ability  to Learn or  Comply with Recommendations   []   Language barrier    []   Cultural needs   []   Motivation    []   Cognitive limitation    []   Physical   []   Education    []   Physiological factors   []   Hearing/vision/speaking impairment   []   Buddhism beliefs    []   Financial factors   []  Other:   [x]  No factors identified at this time. Person Instructed:   [x]   Patient   []   Family   []  Other     Preference for Learning:   [x]   Verbal   []   Written   []  Demonstration     Level of Comprehension & Competence:    [x]  Good                                      [] Fair                                     []  Poor                             []  Needs Reinforcement   [x]  Teachback completed - reviewed hospital insulin protocol     Education Component:    See DM note    [x]  Medication management,  levemir 3 x day    [x]  Nutritional management - - usually 3 meals/day    [x]  Exercise   [x]  Signs, symptoms, and treatment of hyperglycemia and hypoglycemia    Reports no hypoglycemia    [x] Prevention, recognition and treatment of hyperglycemia and hypoglycemia   [x]  Importance of blood glucose monitoring - uses FreeStyle marisa - BG ranges 140's to 250 mg/dl.    []  Instruction on use of the blood glucose meter   [x]  Discuss the importance of HbA1C monitoring        New A1c pending    []  Sick day guidelines   []  Proper use and disposal of lancets, needles, syringes or insulin pens (if appropriate)   [x]  Potential long-term complications (retinopathy, kidney disease, neuropathy, foot care)   [] Information about whom to contact in case of emergency or for more information  Will f/u with PCP    []  Goal:  Patient/family will demonstrate understanding of Diabetes Self Management Skills by: (date) _______  Plan for post-discharge education or self-management support:    [] Outpatient class schedule provided            [] Patient Declined    [] Scheduled for outpatient classes (date) _______  Verify:  Does patient understand how diabetes medications work? ____yes__________________  Does patient know what their most recent A1c is? ______13.4% 2019 - new A1c pending___________  Does patient monitor glucose at home? __________yes Mansoor Bennett___________  Does patient have difficulty obtaining diabetes medications or testing supplies? _______no__________       Ana Crawley MPH, RN CDE  Pager [phone]

## 2020-11-02 NOTE — PROGRESS NOTES
Problem: Dysphagia (Adult)  Goal: *Acute Goals and Plan of Care (Insert Text)  Description: Patient will:  1. Tolerate PO trials with 0 s/s overt distress in 4/5 trials  2. Utilize compensatory swallow strategies/maneuvers (decrease bite/sip, size/rate, alt. liq/sol) with min cues in 4/5 trials    Rec:     Reg solid with thin liquids  Aspiration precautions  HOB >45 during po intake, remain >30 for 30-45 minutes after po   Small bites/sips; alternate liquid/solid with slow feeding rate   Oral care TID - thin liquid swish every 3-4 bites to clear possible residuals  Meds per pt preference    Outcome: Progressing Towards Goal     SPEECH LANGUAGE PATHOLOGY BEDSIDE SWALLOW EVALUATION\    Patient: Wendy Cho (46 y.o. female)  Date: 11/2/2020  Primary Diagnosis: Left sided numbness [R20.0]        Precautions: aspiration     PLOF: As per H&P    ASSESSMENT :  Based on the objective data described below, the patient presents with min oral dysphagia. Oral mech exam revealed very min left orofacial droop/weakness. Pt also with c/o tingling and numbness on the left side. She tolerated reg solid, puree, and thin liquids with no overt s/sx of aspiration. Very min left pocketing observed which was cleared with cyclic ingestion. Reviewed importance of thin liquid wash every 3-4 bites to clear possible residuals with verbalized and demonstrated understanding. Laryngeal elevation was functional/timely to palpation across all PO trials. Recommend regular solid diet with thin liquids with above stated compensatory strategies and aspiration precautions. ST will continue to follow x 1-2 more visits to ensure safety of PO. Patient will benefit from skilled intervention to address the above impairments.   Patient's rehabilitation potential is considered to be Good  Factors which may influence rehabilitation potential include:   []            None noted  []            Mental ability/status  [x]            Medical condition  [] Home/family situation and support systems  []            Safety awareness  []            Pain tolerance/management  []            Other:      PLAN :  Recommendations and Planned Interventions: See above  Frequency/Duration: Patient will be followed by speech-language pathology x 1-2 more visits to address goals. Discharge Recommendations: Outpatient and To Be Determined     SUBJECTIVE:   Patient stated Things feel numb on the left side. OBJECTIVE:     Past Medical History:   Diagnosis Date    Asthma     Bronchitis     Cataract     Diabetes ([address]     Endocrine disease     hyperthyroidism    Endometriosis     Hypertension     Irregular bleeding     Migraine     Pelvic pain     Thyroid disease      Past Surgical History:   Procedure Laterality Date    HX  SECTION      x 3    HX COLONOSCOPY      HX GYN      partial hysterectomy    HX GYN      cervical laparoscopy; endometriosis    HX HYSTERECTOMY       Prior Level of Function/Home Situation: see below  Home Situation  Home Environment: Private residence  One/Two Story Residence: Two story  # of Interior Steps: 13  Interior Rails: Both  Living Alone: No  Support Systems: Spouse/Significant Other/Partner, Child(breonna), Friends \ neighbors  Patient Expects to be Discharged to[de-identified] Private residence  Current DME Used/Available at Home: Glucometer    Diet prior to admission: reg solid with thin  Current Diet:  reg solid with thin      Cognitive and Communication Status:  Neurologic State: Alert  Orientation Level: Oriented X4  Cognition: Follows commands  Oral Assessment:  Oral Assessment  Labial: Left droop(min)  Dentition: Natural;Intact  Oral Hygiene: adequate  Lingual: No impairment  Velum: No impairment  Mandible: No impairment  P.O. Trials:  Patient Position: 90 at side of bed  Vocal quality prior to P.O.: No impairment  Consistency Presented: Thin liquid; Solid;Puree  How Presented: Self-fed/presented;Cup/sip;Spoon;Straw  Bolus Acceptance: No impairment  Bolus Formation/Control: Impaired  Type of Impairment: Mastication  Propulsion: No impairment  Oral Residue: Less than 10% of bolus; Left  Initiation of Swallow: No impairment  Laryngeal Elevation: Functional  Aspiration Signs/Symptoms: None  Pharyngeal Phase Characteristics: No impairment, issues, or problems   Effective Modifications: Alternate liquids/solids(liquid swish to clear residuals)  Cues for Modifications: Minimal-moderate     Oral Phase Severity: Mild  Pharyngeal Phase Severity : No impairment    PAIN:  Start of Eval: 0  End of Eval: 0     After treatment:   []            Patient left in no apparent distress sitting up in chair  [x]            Patient left in no apparent distress in bed  [x]            Call bell left within reach  [x]            Nursing notified  []            Family present  []            Caregiver present  []            Bed alarm activated    COMMUNICATION/EDUCATION:   [x]            Aspiration precautions; swallow safety; compensatory techniques. [x]            Patient/family have participated as able in goal setting and plan of care. []            Patient/family agree to work toward stated goals and plan of care. []            Patient understands intent and goals of therapy; neutral about participation. []            Patient unable to participate in goal setting/plan of care; educ ongoing with interdisciplinary staff  [x]         Posted safety precautions in patient's room.     Thank you for this referral.    Judith Last M.S. CCC-SLP/L  Speech-Language Pathologist

## 2020-11-02 NOTE — DIABETES MGMT
Glycemic Control Plan of Care    Addendum -      Your A1C  was   Lab Results   Component Value Date/Time    Hemoglobin A1c 10.0 (H) 11/01/2020 11:31 PM    Hemoglobin A1c (POC) 13.4 11/21/2019 04:50 PM   .    Reviewed A1c with patient - much improved from one year ago. Reviewed target A1c of 7% - provide printed materials for target BG, DM meal planning portions. She has been consuming large amounts of cranberry juice - reviewed reading labels, and consuming sugar free beverages. Reviewed when to monitor BG and record for her PCP.       Ros Little MPH RN CDE  Pager [phone]

## 2020-11-02 NOTE — ED PROVIDER NOTES
EMERGENCY DEPARTMENT HISTORY AND PHYSICAL EXAM  This was created with voice recognition software and transcription errors may be present. 11:59 PM  Date: 2020  Patient Name: Lotus Berger    History of Presenting Illness     Chief Complaint:    History Provided By:     HPI: Lotus Berger is a 44 y.o. female past medical history of asthma bronchitis cataracts diabetes hyperthyroidism endometriosis migraine who presents with left arm numbness and tingling is been going on for 6 days. No weakness. Patient does have a history of migraine but states that is normally retro-orbital eye pain and some headache not anything like this. Patient does have a family history of MS with 2 cousins but no personal history of multiple sclerosis. No fever no chills she has some chest tightness feeling like there is pressure around her breast.  No nausea no vomiting no shortness of breath. PCP: Nick, MD [place]      Past History     Past Medical History:  Past Medical History:   Diagnosis Date    Asthma     Bronchitis     Cataract     Diabetes ([address]     Endocrine disease     hyperthyroidism    Endometriosis     Hypertension     Irregular bleeding     Migraine     Pelvic pain     Thyroid disease        Past Surgical History:  Past Surgical History:   Procedure Laterality Date    HX  SECTION      x 3    HX COLONOSCOPY      HX GYN      partial hysterectomy    HX GYN      cervical laparoscopy; endometriosis    HX HYSTERECTOMY         Family History:  Family History   Problem Relation Age of Onset    Cancer Mother     Diabetes Mother     Dementia Mother     Diabetes Father        Social History:  Social History     Tobacco Use    Smoking status: Never Smoker    Smokeless tobacco: Never Used   Substance Use Topics    Alcohol use: Yes     Alcohol/week: 0.0 standard drinks     Comment: occasional    Drug use: No       Allergies:   Allergies   Allergen Reactions    Vicodin [Hydrocodone-Acetaminophen] Shortness of Breath       Review of Systems     Review of Systems   All other systems reviewed and are negative. 10 point review of systems otherwise negative unless noted in HPI. Physical Exam       Physical Exam  Constitutional:       Appearance: She is well-developed. HENT:      Head: Normocephalic and atraumatic. Eyes:      Pupils: Pupils are equal, round, and reactive to light. Neck:      Musculoskeletal: Normal range of motion and neck supple. Cardiovascular:      Rate and Rhythm: Normal rate and regular rhythm. Heart sounds: Normal heart sounds. No murmur. No friction rub. Pulmonary:      Effort: Pulmonary effort is normal. No respiratory distress. Breath sounds: Normal breath sounds. No wheezing. Abdominal:      General: There is no distension. Palpations: Abdomen is soft. Tenderness: There is no abdominal tenderness. There is no guarding or rebound. Musculoskeletal: Normal range of motion. Skin:     General: Skin is warm and dry. Neurological:      Mental Status: She is alert and oriented to person, place, and time. Comments: Left face arm and leg numbness  No gross deficits otherwise  Cranial nerves grossly intact     Psychiatric:         Behavior: Behavior normal.         Thought Content: Thought content normal.         Diagnostic Study Results     Vital Signs  EKG: EKG shows sinus at 95 with a normal axis and normal intervals there is no ST elevation or depression no hypertrophy QTC 42  Labs: CBC chemistry unremarkable  Imaging: CT head unremarkable  cxr napd. Medical Decision Making     ED Course: Progress Notes, Reevaluation, and Consults:      Provider Notes (Medical Decision Making): 64-year-old female presents with left arm leg and face numbness certainly concerning for stroke.   Does seem like large distribution to be sensory only she has a family history of MS but that would be more likely some sort of random distribution not necessarily anatomic as this is. Discussed with teleneurology recommend admission for further evaluation MRI. Discussed with the patient who is agrees. Diagnosis     Clinical Impression: No diagnosis found. Disposition:    Patient's Medications   Start Taking    No medications on file   Continue Taking    ACETAMINOPHEN (TYLENOL) 325 MG TABLET    Take 2 Tabs by mouth every six (6) hours as needed for Pain. ALBUTEROL (PROVENTIL HFA, VENTOLIN HFA, PROAIR HFA) 90 MCG/ACTUATION INHALER    Take 2 puffs by inhalation every four (4) hours as needed for Wheezing. ATORVASTATIN (LIPITOR) 20 MG TABLET    Take 1 Tab by mouth daily. BLOOD-GLUCOSE METER MONITORING KIT    Test twice daily    CETIRIZINE (ZYRTEC) 10 MG TABLET    Take 1 Tab by mouth daily. FLASH GLUCOSE SCANNING READER ([employer] RASHMI 14 DAY READER) MISC    1 Each by Does Not Apply route Before breakfast, lunch, dinner and at bedtime. FLUTICASONE PROPION-SALMETEROL (ADVAIR/WIXELA) 250-50 MCG/DOSE DISKUS INHALER    Take 1 Puff by inhalation daily. [employer] RASHMI 14 DAY SENSOR KIT    USE TO CHECK BLOOD SUGAR BEFORE BREAKFAST, LUNCH AND DINNER AND AT BEDTIME    GABAPENTIN (NEURONTIN) 300 MG CAPSULE    TAKE 1 CAPSULE BY MOUTH TWICE DAILY. MAX DAILY AMOUNT: 600 MG    GLUCOSE BLOOD VI TEST STRIPS (BLOOD GLUCOSE TEST) STRIP    Use twice daily as directed, to match her glucometer    INSULIN ASPART U-100 (NOVOLOG) 100 UNIT/ML (3 ML) INPN    <150 BS/0 units, 151-200/2 units, 201-250/4 units, 251-300/6 units, 301-350/8 units, >351-400/10 units and call provider    INSULIN NEEDLES, DISPOSABLE, (PEN NEEDLE, DIABETIC) 31 GAUGE X 1/4\" NDLE    Use twice daily for insulin injections    LEVEMIR U-100 INSULIN 100 UNIT/ML INJECTION    ADMINISTER 40 UNITS UNDER THE SKIN TWICE DAILY    LEVOTHYROXINE (SYNTHROID) 100 MCG TABLET    TAKE 1 TABLET BY MOUTH DAILY BEFORE BREAKFAST FOR HYPOTHYROIDISM    LISINOPRIL (PRINIVIL, ZESTRIL) 20 MG TABLET    Take 1 Tab by mouth daily. METFORMIN (GLUCOPHAGE) 1,000 MG TABLET    Take 1 Tab by mouth two (2) times daily (with meals). Indications: type 2 diabetes mellitus    POLYETHYLENE GLYCOL (MIRALAX) 17 GRAM/DOSE POWDER    1 tablespoon with 8 oz of water every 2 hours for 3 doses. 1 tablespoon with 8 oz of water twice daily until stools are soft and regular. 1 tablespoon with 8 oz of water once daily for maintenance.     SUMATRIPTAN (IMITREX) 50 MG TABLET    Take 1 at onset of migraine - may repeat 1 tab by mouth in 2 hrs if needed   These Medications have changed    No medications on file   Stop Taking    No medications on file

## 2020-11-02 NOTE — PROGRESS NOTES
ARU/IPR REFERRAL CONTACT NOTE  [address] [place] [address] for Physical Rehabilitation    RE: Tiffanie Santos     Thank you for the opportunity to review this patient's case for admission to [address] [place] [address] for Physical Rehabilitation. Based on our pre-admission screening:     [x This patient does not meet criteria for admission to [facility] for Physical   Rehabilitation due to:    [x ] Too functional, per documentation, patient does not require both Physical and Occupational Therapy Services at an acute rehabilitation level of intensity. Again, Thank you for this referral. Should you have any questions please do not hesitate to call. Sincerely,  Mary Rivera. Imer Arroyo, [id number] [address]   Imer Arroyo, RN  Admissions [facility] for Physical Rehabilitation  [phone]

## 2020-11-02 NOTE — CONSULTS
A  44 y.o., right handed female patient came to the ER for left side numbness of 1 week duration. It involves the face, arm, and leg on the left side. She also has weakness of the LUE and LLE. Has difficulty with her . Has difficulty walking from the weakness. She has no new changes in her vision. Has no facial droop. No changes in her speech. She has migraine headaches without aura; 3-5 times days per week, takes triptans with no marked change. No auras with her headache. MRI of the brain did not show any acute changes; only showed minimal microvascular white matter disease. Social History     Socioeconomic History    Marital status:      Spouse name: Not on file    Number of children: Not on file    Years of education: Not on file    Highest education level: Not on file   Occupational History    Not on file   Social Needs    Financial resource strain: Not on file    Food insecurity     Worry: Not on file     Inability: Not on file    Transportation needs     Medical: Not on file     Non-medical: Not on file   Tobacco Use    Smoking status: Never Smoker    Smokeless tobacco: Never Used   Substance and Sexual Activity    Alcohol use:  Yes     Alcohol/week: 0.0 standard drinks     Comment: occasional    Drug use: No    Sexual activity: Yes     Birth control/protection: None   Lifestyle    Physical activity     Days per week: Not on file     Minutes per session: Not on file    Stress: Not on file   Relationships    Social connections     Talks on phone: Not on file     Gets together: Not on file     Attends Evangelical service: Not on file     Active member of club or organization: Not on file     Attends meetings of clubs or organizations: Not on file     Relationship status: Not on file    Intimate partner violence     Fear of current or ex partner: Not on file     Emotionally abused: Not on file     Physically abused: Not on file     Forced sexual activity: Not on file   Other Topics Concern    Not on file   Social History Narrative    Not on file       Family History   Problem Relation Age of Onset   Marv Alston Cancer Mother     Diabetes Mother     Dementia Mother     Diabetes Father         Current Facility-Administered Medications   Medication Dose Route Frequency Provider Last Rate Last Dose    atorvastatin (LIPITOR) tablet 80 mg  80 mg Oral QHS Vaughn Toussaint MD        budesonide-formoterol (SYMBICORT) 80-4.5 mcg inhaler  2 Puff Inhalation BID RT Ava Huff MD        gabapentin (NEURONTIN) capsule 200 mg  200 mg Oral BID Ava Huff MD   200 mg at 11/02/20 0819    insulin glargine (LANTUS) injection 25 Units  25 Units SubCUTAneous DAILY Ava Huff MD   25 Units at 11/02/20 0819    acetaminophen (TYLENOL) tablet 500 mg  500 mg Oral Q6H PRN Ava Huff MD        labetaloL (NORMODYNE;TRANDATE) 20 mg/4 mL (5 mg/mL) injection 5 mg  5 mg IntraVENous Q10MIN PRN Ava Huff MD        [START ON 11/3/2020] aspirin tablet 325 mg  325 mg Oral DAILY Vaughn Toussaint MD        ondansetron [facility]) injection 4 mg  4 mg IntraVENous Q8H PRN Ava Huff MD        [START ON 11/3/2020] magnesium oxide (MAG-OX) tablet 400 mg  400 mg Oral DAILY Vaughn Toussaint MD        clopidogreL (PLAVIX) tablet 75 mg  75 mg Oral DAILY Ava Huff MD   75 mg at 11/02/20 0819    levothyroxine (SYNTHROID) tablet 100 mcg  100 mcg Oral Anila Tarango MD   100 mcg at 11/02/20 0538    enoxaparin (LOVENOX) injection 40 mg  40 mg SubCUTAneous Q24H Ava Huff MD   40 mg at 11/02/20 0819    lisinopriL (PRINIVIL, ZESTRIL) tablet 20 mg  20 mg Oral DAILY Leon Faria MD   Stopped at 11/02/20 1200    ketorolac (TORADOL) injection 15 mg  15 mg IntraVENous Q6H Leon Faria MD   15 mg at 11/02/20 1124    trimethoprim-sulfamethoxazole (BACTRIM DS, SEPTRA DS) 160-800 mg per tablet 1 Tab  1 Tab Oral Q12H Leon Faria MD   1 Tab at 11/02/20 1133    insulin lispro (HUMALOG) injection   SubCUTAneous AC&HS Dalton Mosley MD   6 Units at 20 1215       Past Medical History:   Diagnosis Date    Asthma     Bronchitis     Cataract     Diabetes ([facility] [address]     Endocrine disease     hyperthyroidism    Endometriosis     Hypertension     Irregular bleeding     Migraine     Pelvic pain     Thyroid disease        Past Surgical History:   Procedure Laterality Date    HX  SECTION      x 3    HX COLONOSCOPY      HX GYN      partial hysterectomy    HX GYN      cervical laparoscopy; endometriosis    HX HYSTERECTOMY         Allergies   Allergen Reactions    Sumatriptan Nausea and Vomiting    Vicodin [Hydrocodone-Acetaminophen] Shortness of Breath       Patient Active Problem List   Diagnosis Code    Essential hypertension I10    Type 2 diabetes mellitus without complication, with long-term current use of insulin ([facility]) E11.9, Z79.4    Asthma J45.909    Hypothyroidism E03.9    Chronic seasonal allergic rhinitis J30.2    Intractable migraine without aura and without status migrainosus G43.019    Hyperlipidemia associated with type 2 diabetes mellitus ([facility]) E11.69, E78.5    Mild intermittent asthma without complication S44.08    Left sided numbness R20.0         Review of Systems:   Constitutional no fever or chills  Skin denies rash or itching  HENT  Denies tinnitus, hearing lose  Eyes: has blurring of vision more on the left side. Respiratory has shortness of breath  Cardiovascular: No chest pain. Gastrointestinal: has nuasea but no vomiting.     Genitourinary denies incontinence  Musculoskeletal denies joint pain or swelling  Hematology denies  bleeding   Neurological as above in HPI      PHYSICAL EXAMINATION:      VITAL SIGNS:    Visit Vitals  BP (!) 168/94 (BP 1 Location: Left arm, BP Patient Position: Supine)   Pulse 83   Temp 97.4 °F (36.3 °C)   Resp 18   Ht 5' 6\" (1.676 m)   Wt 73.9 kg (163 lb)   SpO2 99%   BMI 26.31 kg/m²       GENERAL: In  no apparent distress. EXTREMITIES: Muscle tone is normal.  HEAD:   The patient is normocephalic. NEUROLOGIC EXAMINATION  Mental status: Awake, alert, oriented x3, follows simple, complex and inverted commands, no neglect, no extinction to DSS or VSS. Speech and languge: fluent, coherent,  and comprehension intact  CN: VFF, EOMI, PERRLA, decreased LT and PP over the left face;  no facial asymmetry noted, palate elevation symmetric bilat, SS+SCM 5/5 bilat, tongue midline  Motor: no pronator drift;  tone normal throughout; strength over the left side is 4-4+/5; but mostly poor effort;  strength 5/5 on the right. Positive Farley's. Sensory: Decreased LT and PP on the whole left side. Coordination: FNF, HS accurate w/o dysmetria  DTR: 2+ throughout, toes downgoing BL  Gait: not tested       Result Information     Status: Final result (Exam End: 11/2/2020 09:59)  Provider Status: Open    Study Result     MRI BRAIN WITHOUT CONTRAST     PROVIDED REASON FOR EXAM: concern for stroke. thanks  Additional History: History of type 2 diabetes, presents with intermittent  left-sided numbness  Comparison Studies: Head CT 11/2/2020, no prior brain MRI available     Imaging Technique:     Sequences:  Sagittal,  Coronal and axial T1 weighted, Diffusion Weighted  (DWI), Axial T2 weighted, Axial T2 FLAIR, and SWI/GRE MRI images of the brain.     Contrast Material:  NONE     Limiting Factors/Major Artifacts: None.     FINDINGS:     Brain Parenchyma: Negative for acute infarct. Trace amount of increased T2  FLAIR signal in the periventricular white matter likely minimal chronic small  vessel ischemic changes. No chronic cortical infarcts or chronic lacunar  infarcts. No visible masses or midline shift.     CSF Spaces:  Normal in size and morphology for the patient's age. No  hydrocephalus.     Vascular System:  Grossly patent flow in basilar and internal cerebral arteries.   No findings of dural sinus thrombosis.      Hemorrhage:  No acute hemorrhage. No chronic microhemorrhage.     Other Structures:     Calvarium: No suspicious marrow signal.     Sella: Pituitary is not enlarged. Visualized Upper Cervical Spine: Normal.  Orbits: Normal for non-dedicated exam.  Paranasal Sinuses: No significant paranasal sinus disease. Mastoid Air Cells:  Clear.     IMPRESSION  IMPRESSION:     No acute intracranial abnormality. No mass, hemorrhage, or acute infarct.     Mild/minimal burden of increased T2 FLAIR signal in the periventricular white  matter about the frontal horns and atria of the lateral ventricles. Most likely  minimal chronic small vessel ischemic changes related to chronic hypertension  and diabetes. Result Information     Status: Final result (Exam End: 11/2/2020 03:08)  Provider Status: Open    Study Result     CTA NECK AND BRAIN     CPT CODE: 17945,98557     REASON FOR EXAM: ? CVA  HISTORY: Numbness and tingling right-sided body for 6 days.     COMPARISON: Head CT without contrast 11/2/2020.     TECHNIQUE: Helical CT scan of the brain and neck were performed during rapid IV  bolus contrast administration. These data were reconstructed for vascular  analysis. 3-D postprocessed images, including surface shaded displays, were  produced on a dedicated workstation, permanently archived, and interpreted. CT dose reduction was achieved through use of a standardized protocol tailored  for this examination and automatic exposure control for dose modulation.      CONTRAST: 100  mL Isovue 370 iodinated contrast IV.        CTA SOFT TISSUE ANALYSIS: Lung apices are clear. The mucosal surfaces of the  aerodigestive tract are unremarkable. The salivary glands are normal.   Approximately 1 cm low-attenuation nodule in the right thyroid lobe. Sub-5 mm  low-attenuation nodules in both thyroid lobes. Imaging through the upper  mediastinum shows some hazy nonmasslike soft tissue in the anterior mediastinum  that may be some residual thymus.  No focal osseous pathology seen. No  significant cervical central canal stenosis seen. Paranasal sinuses are clear.      Normal brain attenuations. No pathologic enhancement or mass lesion. Normal CSF  spaces. No mass effect or midline shift.      CTA NECK VASCULAR ANALYSIS:   Aortic arch: Typical arch anatomy. No arch stenosis or aneurysm. Innominate artery: Patent, no stenosis. Right subclavian artery: Patent, no stenosis. Left subclavian artery: Patent, no stenosis.     Right carotid:  Right CCA: No stenosis  Right ICA origin: Mild plaque, no stenosis. 0% diameter stenosis BENJI by NASCET  criteria. Right ICA: Patent to the skull base.     Left carotid:  Left CCA: CCA origin is partially obscured by artifact from contrast remaining  in the innominate vein. Otherwise the CCA is patent to the bifurcation, no  stenosis. Left ICA origin: No stenosis. 0% diameter stenosis LICA by NASCET criteria. Left ICA: Patent to the skull base.     Right vertebral artery: Patent. No stenosis.     Left vertebral artery: Patent. No stenosis.     CTA BRAIN VASCULAR ANALYSIS:      RIGHT ANTERIOR CIRCULATION: Patent    Distal ICA: No stenosis. MCA: No stenosis. MARY KAY: No stenosis.     ACOM:     LEFT ANTERIOR CIRCULATION: Patent  Distal ICA: No stenosis. MCA: No stenosis. MARY KAY: No stenosis.     POSTERIOR CIRCULATION:   RVA: Patent. No stenosis. LVA: Patent. No stenosis. Basilar: Patent. No stenosis. Right PCA: Patent. No stenosis. Left PCA: Patent. No stenosis.       IMPRESSION  IMPRESSION:      1. Patent intracranial circulation. No large vessel occlusion. No significant  intracranial stenosis. 2. Patent cervical carotid and vertebral arteries, no significant stenosis. 0%  NASCET ICA stenosis bilaterally. 3. Heterogeneous thyroid gland with subcentimeter low-attenuation nodule noted  on the right. Multiple very small nodules in bilateral thyroid lobes. Similar  findings described on thyroid ultrasound from 2016.   4. Small amount of hazy nonmasslike soft tissue in the anterior mediastinum is  partially imaged, suspected residual thymus. CBC:   Lab Results   Component Value Date/Time    WBC 5.4 11/01/2020 11:31 PM    RBC 4.78 11/01/2020 11:31 PM    HGB 12.1 11/01/2020 11:31 PM    HCT 37.2 11/01/2020 11:31 PM    PLATELET 105 76/16/3222 11:31 PM     BMP:   Lab Results   Component Value Date/Time    Glucose 244 (H) 11/01/2020 11:31 PM    Sodium 142 11/01/2020 11:31 PM    Potassium 3.7 11/01/2020 11:31 PM    Chloride 107 11/01/2020 11:31 PM    CO2 31 11/01/2020 11:31 PM    BUN 11 11/01/2020 11:31 PM    Creatinine 0.87 11/01/2020 11:31 PM    Calcium 9.0 11/01/2020 11:31 PM     CMP:   Lab Results   Component Value Date/Time    Glucose 244 (H) 11/01/2020 11:31 PM    Sodium 142 11/01/2020 11:31 PM    Potassium 3.7 11/01/2020 11:31 PM    Chloride 107 11/01/2020 11:31 PM    CO2 31 11/01/2020 11:31 PM    BUN 11 11/01/2020 11:31 PM    Creatinine 0.87 11/01/2020 11:31 PM    Calcium 9.0 11/01/2020 11:31 PM    Anion gap 4 11/01/2020 11:31 PM    BUN/Creatinine ratio 13 11/01/2020 11:31 PM    Alk. phosphatase 124 (H) 11/01/2020 11:31 PM    Protein, total 7.5 11/01/2020 11:31 PM    Albumin 3.6 11/01/2020 11:31 PM    Globulin 3.9 11/01/2020 11:31 PM    A-G Ratio 0.9 11/01/2020 11:31 PM     Coagulation:   Lab Results   Component Value Date/Time    Prothrombin time 13.1 05/14/2011 07:30 AM    INR 1.0 05/14/2011 07:30 AM    aPTT 34.0 05/14/2011 07:30 AM       Impression:   A 44years old male patient with medical history of hypertension, diabetes, and hyperlipidemia came to the emergency room for left-sided numbness of 1 week duration. Claims he is getting worse. Has weakness of the left upper and lower extremities with some difficulty walking. Has difficulty holding with the left hand. No previous stroke stroke. CT scan of the brain did not show any acute changes. MRI of the brain did not support the diagnosis of acute stroke. Exam is mostly from poor effort. Patient has history of migraine headache and takes as needed medications. Hemoglobin A1c 10.0. Lipid panel pending. Plan:  -Continue with aspirin and home dose of atorvastatin  -Need to follow for her migraine headache  -Call for questions. PLEASE NOTE:   This document has been produced using voice recognition software. Unrecognized errors in transcription may be present.

## 2020-11-02 NOTE — H&P
History & Physical    Patient: Chapo Avila MRN: [id number]  CSN: [id number]    YOB: 1970  Age: 44 y.o. Sex: female      DOA: 11/1/2020    Chief Complaint   Patient presents with    Shortness of Breath    Numbness          HPI:     Chapo Avila is a 44 y.o. female with a past medical history of diabetes type 2, insulin-dependent, complicated by peripheral neuropathy, migraine, hypertension, dyslipidemia, family history of multiple sclerosis who several days ago began to experience numbness intermittently to the left side of her body, unlike her migraines. BP noted 197/99. This was associated with left sided weakness. Patient states she is fallen several times at home patient reports history of migraine, poorly controlled, she did not tolerate sumatriptan as it gave her nausea and vomiting. She does not have a neurologist in the community. She gets a migraine 2-3 times weekly, characterized by light hurting her eyes, noise hurting her ears, and movement making it worse. She in fact has a migraine at my interview, but states it is mild. She denies any recent fevers or chills, no change to her taste or smell, no cough, no shortness of breath, no known Covid exposure. She has 2 cousins with multiple sclerosis. Her mom has Alzheimer's dementia. In the ED, head CT was negative. CTA head and neck done, report pending. She was evaluated by teleneurology. Upon neurologist's review of CT angiogram, there was no large vessel occlusion, and no evidence of critical stenosis within the basilar artery. Official report pending. Teleneurologist reports she is outside the time window for TPA or thrombectomy. Recommended for admission, hospital stroke order sets. Aspirin and Plavix recommended.       Past Medical History:   Diagnosis Date    Asthma     Bronchitis     Cataract     Diabetes ([facility] [address]     Endocrine disease     hyperthyroidism    Endometriosis     Hypertension     Irregular bleeding  Migraine     Pelvic pain     Thyroid disease        Past Surgical History:   Procedure Laterality Date    HX  SECTION      x 3    HX COLONOSCOPY      HX GYN      partial hysterectomy    HX GYN      cervical laparoscopy; endometriosis    HX HYSTERECTOMY         Family History   Problem Relation Age of Onset    Cancer Mother     Diabetes Mother     Dementia Mother     Diabetes Father        Social History     Socioeconomic History    Marital status:      Spouse name: Not on file    Number of children: Not on file    Years of education: Not on file    Highest education level: Not on file   Tobacco Use    Smoking status: Never Smoker    Smokeless tobacco: Never Used   Substance and Sexual Activity    Alcohol use: Yes     Alcohol/week: 0.0 standard drinks     Comment: occasional    Drug use: No    Sexual activity: Yes     Birth control/protection: None       Prior to Admission medications    Medication Sig Start Date End Date Taking? Authorizing Provider   levothyroxine (SYNTHROID) 100 mcg tablet TAKE 1 TABLET BY MOUTH DAILY BEFORE BREAKFAST FOR HYPOTHYROIDISM 10/30/20   Nikia Jefferson NP   FreeStyle Sabrina 14 Day Sensor kit USE TO CHECK BLOOD SUGAR BEFORE BREAKFAST, LUNCH AND DINNER AND AT BEDTIME 20   Nikia Jefferson NP   gabapentin (NEURONTIN) 300 mg capsule TAKE 1 CAPSULE BY MOUTH TWICE DAILY. MAX DAILY AMOUNT: 600 MG 20   Nikia Jefferson NP   Levemir U-100 Insulin 100 unit/mL injection ADMINISTER 40 UNITS UNDER THE SKIN TWICE DAILY 20   Jamie Salazar MD   insulin aspart U-100 (NOVOLOG) 100 unit/mL (3 mL) inpn <150 BS/0 units, 151-200/2 units, 201-250/4 units, 251-300/6 units, 301-350/8 units, >351-400/10 units and call provider 20   Nikia Jefferson NP   flash glucose scanning reader ([employer] Sabrina 14 Day [place]) misc 1 Each by Does Not Apply route Before breakfast, lunch, dinner and at bedtime.  4/3/20   Nikia Jefferson NP   fluticasone propion-salmeterol (ADVAIR/WIXELA) 250-50 mcg/dose diskus inhaler Take 1 Puff by inhalation daily. 11/21/19   Jeana Casillas NP   metFORMIN (GLUCOPHAGE) 1,000 mg tablet Take 1 Tab by mouth two (2) times daily (with meals). Indications: type 2 diabetes mellitus 11/21/19   Jeana Casillas NP   atorvastatin (LIPITOR) 20 mg tablet Take 1 Tab by mouth daily. 11/21/19   Jeana Casillas NP   lisinopril (PRINIVIL, ZESTRIL) 20 mg tablet Take 1 Tab by mouth daily. 11/21/19   Jeana Casillas NP   acetaminophen (TYLENOL) 325 mg tablet Take 2 Tabs by mouth every six (6) hours as needed for Pain. 6/4/19   Angel Hirsch PA-C   polyethylene glycol (MIRALAX) 17 gram/dose powder 1 tablespoon with 8 oz of water every 2 hours for 3 doses. 1 tablespoon with 8 oz of water twice daily until stools are soft and regular. 1 tablespoon with 8 oz of water once daily for maintenance. 6/4/19   Angel Hirsch PA-C   Insulin Needles, Disposable, (PEN NEEDLE, DIABETIC) 31 gauge x 1/4\" ndle Use twice daily for insulin injections 8/13/18   Corey Barrios MD   Blood-Glucose Meter monitoring kit Test twice daily 2/16/18   Corey Barrios MD   glucose blood VI test strips (BLOOD GLUCOSE TEST) strip Use twice daily as directed, to match her glucometer 2/16/18   Corey Barrios MD   SUMAtriptan (IMITREX) 50 mg tablet Take 1 at onset of migraine - may repeat 1 tab by mouth in 2 hrs if needed 1/15/18   Corey Barrios MD   cetirizine (ZYRTEC) 10 mg tablet Take 1 Tab by mouth daily. Patient taking differently: Take 10 mg by mouth daily as needed. 3/2/15   Wilmer Hart, NP   albuterol (PROVENTIL HFA, VENTOLIN HFA, PROAIR HFA) 90 mcg/actuation inhaler Take 2 puffs by inhalation every four (4) hours as needed for Wheezing.  11/28/14   Caitlin Johnson MD       Allergies   Allergen Reactions    Vicodin [Hydrocodone-Acetaminophen] Shortness of Breath       Review of Systems  GENERAL: No fevers or chills. HEENT: No change in vision, no earache, tinnitus, sore throat or sinus congestion. NECK: No pain or stiffness. CARDIOVASCULAR: No chest pain or pressure. No palpitations. PULMONARY: No shortness of breath, cough or wheeze. GASTROINTESTINAL: No abdominal pain, nausea, vomiting or diarrhea, melena or       bright red blood per rectum. GENITOURINARY: No urinary frequency, urgency, hesitancy or dysuria. MUSCULOSKELETAL: No joint or muscle pain, no back pain, no recent trauma. DERMATOLOGIC: No rash, no itching, no lesions. ENDOCRINE: No polyuria, polydipsia, no heat or cold intolerance. No recent change in    weight. HEMATOLOGICAL: No anemia or easy bruising or bleeding. NEUROLOGIC: No seizure. Headache, migraines. Numbness to left side of body, intermittent. Left-sided weakness. Several falls at home. PSYCHIATRIC: No depression, anxiety, mood disorder, no loss of interest in normal       activity or change in sleep pattern. Physical Exam:     Physical Exam:  Visit Vitals  BP (!) 191/94   Pulse 83   Temp 98 °F (36.7 °C)   Resp 18   Wt 73.9 kg (163 lb)   SpO2 99%   BMI 27.12 kg/m²      O2 Device: Room air    Temp (24hrs), Av °F (36.7 °C), Min:98 °F (36.7 °C), Max:98 °F (36.7 °C)       No intake/output data recorded. No intake/output data recorded. General:  Alert, cooperative, no distress, appears stated age. Head:  Normocephalic, without obvious abnormality, atraumatic. Eyes:  Conjunctivae/corneas clear. PERRL, EOMs intact. Nose: Nares normal. No drainage or sinus tenderness. Throat: Lips, mucosa, and tongue normal. Teeth and gums normal.   Neck: Supple, symmetrical, trachea midline, no adenopathy, thyroid: no enlargement/tenderness/nodules, no carotid bruit and no JVD. Back:   ROM normal. No CVA tenderness. Lungs:   Clear to auscultation bilaterally. Chest wall:  No tenderness or deformity.    Heart:  Regular rate and rhythm, S1, S2 normal, no murmur, click, rub or gallop. Abdomen: Soft, non-tender. Bowel sounds normal. No masses,  No organomegaly. Extremities: Extremities normal, atraumatic, no cyanosis or edema. Pulses: 2+ and symmetric all extremities. Skin: Skin color, texture, turgor normal. No rashes or lesions   Neurologic: CNII-XII intact. Face symmetrical.  Left-sided weakness. Decreased sensation to both feet to the level of her ankles. Toes downgoing bilaterally. Tongue protrudes midline. Labs Reviewed:    Recent Results (from the past 24 hour(s))   CBC WITH AUTOMATED DIFF    Collection Time: 11/01/20 11:31 PM   Result Value Ref Range    WBC 5.4 4.6 - 13.2 K/uL    RBC 4.78 4.20 - 5.30 M/uL    HGB 12.1 12.0 - 16.0 g/dL    HCT 37.2 35.0 - 45.0 %    MCV 77.8 74.0 - 97.0 FL    MCH 25.3 24.0 - 34.0 PG    MCHC 32.5 31.0 - 37.0 g/dL    RDW 12.6 11.6 - 14.5 %    PLATELET 077 832 - 490 K/uL    MPV 10.5 9.2 - 11.8 FL    NEUTROPHILS 46 40 - 73 %    LYMPHOCYTES 42 21 - 52 %    MONOCYTES 8 3 - 10 %    EOSINOPHILS 4 0 - 5 %    BASOPHILS 0 0 - 2 %    ABS. NEUTROPHILS 2.5 1.8 - 8.0 K/UL    ABS. LYMPHOCYTES 2.3 0.9 - 3.6 K/UL    ABS. MONOCYTES 0.4 0.05 - 1.2 K/UL    ABS. EOSINOPHILS 0.2 0.0 - 0.4 K/UL    ABS.  BASOPHILS 0.0 0.0 - 0.1 K/UL    DF AUTOMATED     METABOLIC PANEL, BASIC    Collection Time: 11/01/20 11:31 PM   Result Value Ref Range    Sodium 142 136 - 145 mmol/L    Potassium 3.7 3.5 - 5.5 mmol/L    Chloride 107 100 - 111 mmol/L    CO2 31 21 - 32 mmol/L    Anion gap 4 3.0 - 18 mmol/L    Glucose 244 (H) 74 - 99 mg/dL    BUN 11 7.0 - 18 MG/DL    Creatinine 0.87 0.6 - 1.3 MG/DL    BUN/Creatinine ratio 13 12 - 20      GFR est AA >60 >60 ml/min/1.73m2    GFR est non-AA >60 >60 ml/min/1.73m2    Calcium 9.0 8.5 - 10.1 MG/DL   CARDIAC PANEL,(CK, CKMB & TROPONIN)    Collection Time: 11/01/20 11:31 PM   Result Value Ref Range    CK - MB 2.2 <3.6 ng/ml    CK-MB Index 1.1 0.0 - 4.0 %     (H) 26 - 192 U/L    Troponin-I, QT <0.02 0.0 - 0.045 NG/ML HEPATIC FUNCTION PANEL    Collection Time: 11/01/20 11:31 PM   Result Value Ref Range    Protein, total 7.5 6.4 - 8.2 g/dL    Albumin 3.6 3.4 - 5.0 g/dL    Globulin 3.9 2.0 - 4.0 g/dL    A-G Ratio 0.9 0.8 - 1.7      Bilirubin, total 0.6 0.2 - 1.0 MG/DL    Bilirubin, direct 0.2 0.0 - 0.2 MG/DL    Alk. phosphatase 124 (H) 45 - 117 U/L    AST (SGOT) 37 10 - 38 U/L    ALT (SGPT) 26 13 - 56 U/L   MAGNESIUM    Collection Time: 11/01/20 11:31 PM   Result Value Ref Range    Magnesium 1.5 (L) 1.6 - 2.6 mg/dL   EKG, 12 LEAD, INITIAL    Collection Time: 11/01/20 11:54 PM   Result Value Ref Range    Ventricular Rate 95 BPM    Atrial Rate 95 BPM    P-R Interval 122 ms    QRS Duration 88 ms    Q-T Interval 384 ms    QTC Calculation (Bezet) 482 ms    Calculated P Axis 51 degrees    Calculated R Axis 21 degrees    Calculated T Axis 47 degrees    Diagnosis       Normal sinus rhythm  Prolonged QT  Abnormal ECG  When compared with ECG of 15-OCT-2019 20:01,  No significant change was found         Procedures/imaging: see electronic medical records for all procedures/Xrays and details which were not copied into this note but were reviewed prior to creation of Plan        Assessment/Plan     1.left sided numbness, concern for stroke or possibly complicated migraine. Aspirin, Plavix, statin. Permissive hypertension. MRI brain ordered stat. Telemetry monitoring. Neurology to consult. Noted family hx of multiple sclerosis. 2. Hypertension. Permissive hypertension, see #1  3. Diabetes type II. Lantus sliding scale insulin ADA diet. Check A1c  4. Hypothyroidism. Continue Synthroid  5. Migraine -home med added to list of intolerances. I have encouraged her to seek a neurologist to follow in the outpatient setting. 6.  Hypomagnesemia replete as needed  7. Falls at home  8. DVT prophylaxis  9. Full code. Admit to telemetry.     Maria Isabel Avalos MD  November 2, 2020

## 2020-11-02 NOTE — PROGRESS NOTES
Problem: Motor Speech Impaired (Adult)  Goal: *Acute Goals and Plan of Care (Insert Text)  Description: Goals:   1. Utilize compensatory strategies (decrease rate, overarticulate, increase intensity) to increase intelligibility to >90% at word level with mod A visual/verbal cues in 4/5 trials. 2. Perform oral motor exercises (with and without resistance) in therapy and at home to increase oral motor strength/range-of-motion for articulation tasks with mod A with visual/verbal cues in 4/5 trials. 3. Complete articulatory agility tasks (reading-conversation) with mod A with visual/verbal cues in 4/5 trials. 4. Demonstrate functional increase in articulation skills for effective participation in communication ADLs with mod A. Outcome: Progressing Towards Goal     SPEECH LANGUAGE PATHOLOGY EVALUATION    Patient: Osmani Lopes (67 y.o. female)  Date: 11/2/2020  Primary Diagnosis: Left sided numbness [R20.0]        Precautions: aspiration       ASSESSMENT :  Based on the objective data described below, the patient presents with min dysarthria. Oral mech exam revealed very min left orofacial droop/weakness. Pt also with c/o tingling and numbness on the left side. Speech intelligibility was >90% to unfamiliar listener during spontaneous speech tasks; however, minimally blended word boundaries observed. Expressive/receptive language function was essentially [facility]. Pt communicated in sentences of appropriate form, content, and use. Social conversation was appropriate. Reviewed oral motor exercises and pt was provided with exercise worksheet. Further reviewed compensatory speech strategies and facial massage techniques. ST will continue to follow. She may benefit from skilled SLP services to address above stated deficits when DC from this facility. Patient will benefit from skilled intervention to address the above impairments.   Patients rehabilitation potential is considered to be Good  Factors which may influence rehabilitation potential include:   []              None noted  []              Mental ability/status  [x]              Medical condition  []              Home/family situation and support systems  []              Safety awareness  []              Pain tolerance/management  []              Other:      PLAN :  Recommendations and Planned Interventions: See above  Frequency/Duration: Patient will be followed by speech-language pathology 1-2 times per day/2-3 days per week to address goals. Discharge Recommendations: Outpatient and To Be Determined     SUBJECTIVE:   Patient stated I am numb on the left side.     OBJECTIVE:     Past Medical History:   Diagnosis Date    Asthma     Bronchitis     Cataract     Diabetes ([facility] [address]     Endocrine disease     hyperthyroidism    Endometriosis     Hypertension     Irregular bleeding     Migraine     Pelvic pain     Thyroid disease      Past Surgical History:   Procedure Laterality Date    HX  SECTION      x 3    HX COLONOSCOPY      HX GYN      partial hysterectomy    HX GYN      cervical laparoscopy; endometriosis    HX HYSTERECTOMY       Prior Level of Function/Home Situation: see below  Home Situation  Home Environment: Private residence  One/Two Story Residence: Two story  # of Interior Steps: 13  Interior Rails: Both  Living Alone: No  Support Systems: Spouse/Significant Other/Partner, Child(breonna), Friends \ neighbors  Patient Expects to be Discharged to[de-identified] Private residence  Current DME Used/Available at Home: Glucometer  Mental Status:  Neurologic State: Alert  Orientation Level: Oriented X4  Cognition: Follows commands  Motor Speech:  Oral-Motor Structure/Motor Speech  Labial: Left droop(min)  Dentition: Natural;Intact  Oral Hygiene: adequate  Lingual: No impairment  Velum: No impairment  Mandible: No impairment  Apraxic Characteristics: None  Dysarthric Characteristics: Blended word boundaries  Intelligibility: No impairment  Intonation: WFL  Rate: WFL  Prosody: WFL  Overall Impairment Severity: Minimal    The severity rating is based on the following outcomes:    Clinical judgment     PAIN:  Pt reports 0/10 pain or discomfort prior to evaluation. Pt reports 0/10 pain or discomfort post evaluation. After treatment:   []              Patient left in no apparent distress sitting up in chair  [x]              Patient left in no apparent distress in bed  [x]              Call bell left within reach  [x]              Nursing notified  []              Caregiver present  []              Bed alarm activated    COMMUNICATION/EDUCATION:   [x] Patient educated regarding dysarthria exercises and compensatory speech strategies. [x] Patient/family have participated as able in goal setting and plan of care. []  Patient/family agree to work toward stated goals and plan of care. []  Patient understands intent and goals of therapy, but is neutral about his/her participation. []  Patient is unable to participate in goal setting and plan of care.     Thank you for this referral.    Judith Last M.S. CCC-SLP/L  Speech-Language Pathologist

## 2020-11-02 NOTE — ED TRIAGE NOTES
C/o numbness and tingling to entire right side of body x 6 days. Shortness of breath x 1 day worsening with exertion. Hx of asthma, diabetes and neuropathy. Able to speak in full sentences without difficulty. Ambulatory without assistance.

## 2020-11-02 NOTE — DISCHARGE INSTRUCTIONS
Patient armband removed and shredded    MyChart Activation    Thank you for requesting access to Nomi. Please follow the instructions below to securely access and download your online medical record. Nomi allows you to send messages to your doctor, view your test results, renew your prescriptions, schedule appointments, and more. How Do I Sign Up? 1. In your internet browser, go to [email or web address]  2. Click on the First Time User? Click Here link in the Sign In box. You will be redirect to the New Member Sign Up page. 3. Enter your Nomi Access Code exactly as it appears below. You will not need to use this code after youve completed the sign-up process. If you do not sign up before the expiration date, you must request a new code. Nomi Access Code: Activation code not generated  Current Nomi Status: Active (This is the date your Nomi access code will )    4. Enter the last four digits of your Social Security Number (xxxx) and Date of Birth (mm/dd/yyyy) as indicated and click Submit. You will be taken to the next sign-up page. 5. Create a Nomi ID. This will be your Nomi login ID and cannot be changed, so think of one that is secure and easy to remember. 6. Create a Nomi password. You can change your password at any time. 7. Enter your Password Reset Question and Answer. This can be used at a later time if you forget your password. 8. Enter your e-mail address. You will receive e-mail notification when new information is available in [address] 9. Click Sign Up. You can now view and download portions of your medical record. 10. Click the Download Summary menu link to download a portable copy of your medical information. Additional Information    If you have questions, please visit the Frequently Asked Questions section of the Nomi website at [email or web address] "[employer]"/[email or web address] Remember, Nomi is NOT to be used for urgent needs.  For medical emergencies, dial 911. Patient Education     Nutrition Tips for Diabetes: After Your Visit  Your Care Instructions  A healthy diet is important to manage diabetes. It helps you lose weight (if you need to) and keep it off. It gives you the nutrition and energy your body needs and helps prevent heart disease. But a diet for diabetes does not mean that you have to eat special foods. You can eat what your family eats, including occasional sweets and other favorites. But you do have to pay attention to how often you eat and how much you eat of certain foods. The right plan for you will give you meals that help you keep your blood sugar at healthy levels. Try to eat a variety of foods and to spread carbohydrate throughout the day. Carbohydrate raises blood sugar higher and more quickly than any other nutrient does. Carbohydrate is found in sugar, breads and cereals, fruit, starchy vegetables such as potatoes and corn, and milk and yogurt. You may want to work with a dietitian or diabetes educator to help you plan meals and snacks. A dietitian or diabetes educator also can help you lose weight if that is one of your goals. The following tips can help you enjoy your meals and stay healthy. Follow-up care is a key part of your treatment and safety. Be sure to make and go to all appointments, and call your doctor if you are having problems. Its also a good idea to know your test results and keep a list of the medicines you take. How can you care for yourself at home? · Learn which foods have carbohydrate and how much carbohydrate to eat. A dietitian or diabetes educator can help you learn to keep track of how much carbohydrate you eat. · Spread carbohydrate throughout the day. Eat some carbohydrate at all meals, but do not eat too much at any one time. · Plan meals to include food from all the food groups.  These are the food groups and some example portion sizes:  ¨ Grains: 1 slice of bread (1 ounce), ½ cup of cooked cereal, and 1/3 cup of cooked pasta or rice. These have about 15 grams of carbohydrate in a serving. Choose whole grains such as whole wheat bread or crackers, oatmeal, and brown rice more often than refined grains. ¨ Fruit: 1 small fresh fruit, such as an apple or orange; ½ of a banana; ½ cup of chopped, cooked, or canned fruit; ½ cup of fruit juice; 1 cup of melon or raspberries; and 2 tablespoons of dried fruit. These have about 15 grams of carbohydrate in a serving. ¨ Dairy: 1 cup of nonfat or low-fat milk and 2/3 cup of plain yogurt. These have about 15 grams of carbohydrate in a serving. ¨ Protein foods: Beef, chicken, turkey, fish, eggs, tofu, cheese, cottage cheese, and peanut butter. A serving size of meat is 3 ounces, which is about the size of a deck of cards. Examples of meat substitute serving sizes (equal to 1 ounce of meat) are 1/4 cup of cottage cheese, 1 egg, 1 tablespoon of peanut butter, and ½ cup of tofu. These have very little or no carbohydrate per serving. ¨ Vegetables: Starchy vegetables such as ½ cup of cooked dried beans, peas, potatoes, or corn have about 15 grams of carbohydrate. Nonstarchy vegetables have very little carbohydrate, such as 1 cup of raw leafy vegetables (such as spinach), ½ cup of other vegetables (cooked or chopped), and 3/4 cup of vegetable juice. · Use the plate format to plan meals. It is a good, quick way to make sure that you have a balanced meal. It also helps you spread carbohydrate throughout the day. You divide your plate by types of foods. Put vegetables on half the plate, meat or meat substitutes on one-quarter of the plate, and a grain or starchy vegetable (such as brown rice or a potato) in the final quarter of the plate.  To this you can add a small piece of fruit and 1 cup of milk or yogurt, depending on how much carbohydrate you are supposed to eat at a meal.  · Talk to your dietitian or diabetes educator about ways to add limited amounts of sweets into your meal plan. You can eat these foods now and then, as long as you include the amount of carbohydrate they have in your daily carbohydrate allowance. · If you drink alcohol, limit it to no more than 1 drink a day for women and 2 drinks a day for men. If you are pregnant, no amount of alcohol is known to be safe. · Protein, fat, and fiber do not raise blood sugar as much as carbohydrate does. If you eat a lot of these nutrients in a meal, your blood sugar will rise more slowly than it would otherwise. · Limit saturated fats, such as those from meat and dairy products. Try to replace it with monounsaturated fat, such as olive oil. This is a healthier choice because people who have diabetes are at higher-than-average risk of heart disease. But use a modest amount of olive oil. A tablespoon of olive oil has 14 grams of fat and 120 calories. · Exercise lowers blood sugar. If you take insulin by shots or pump, you can use less than you would if you were not exercising. Keep in mind that timing matters. If you exercise within 1 hour after a meal, your body may need less insulin for that meal than it would if you exercised 3 hours after the meal. Test your blood sugar to find out how exercise affects your need for insulin. · Exercise on most days of the week. Aim for at least 30 minutes. Exercise helps you stay at a healthy weight and helps your body use insulin. Walking is an easy way to get exercise. Gradually increase the amount you walk every day. You also may want to swim, bike, or do other activities. When you eat out  · Learn to estimate the serving sizes of foods that have carbohydrate. If you measure food at home, it will be easier to estimate the amount in a serving of restaurant food. · If the meal you order has too much carbohydrate (such as potatoes, corn, or baked beans), ask to have a low-carbohydrate food instead. Ask for a salad or green vegetables.   · If you use insulin, check your blood sugar before and after eating out to help you plan how much to eat in the future. · If you eat more carbohydrate at a meal than you had planned, take a walk or do other exercise. This will help lower your blood sugar. Where can you learn more? Go to [employer].be  Enter C741 in the search box to learn more about \"Nutrition Tips for Diabetes: After Your Visit. \"   © 8493-9433 Healthwise, Incorporated. Care instructions adapted under license by [facility] [employer] (which disclaims liability or warranty for this information). This care instruction is for use with your licensed healthcare professional. If you have questions about a medical condition or this instruction, always ask your healthcare professional. [place] [address] any warranty or liability for your use of this information. Content Version: 63.9.175816; Current as of: June 4, 2014                 Patient Education        Learning About Diabetes Food Guidelines  Your Care Instructions     Meal planning is important to manage diabetes. It helps keep your blood sugar at a target level (which you set with your doctor). You don't have to eat special foods. You can eat what your family eats, including sweets once in a while. But you do have to pay attention to how often you eat and how much you eat of certain foods. You may want to work with a dietitian or a certified diabetes educator (CDE) to help you plan meals and snacks. A dietitian or CDE can also help you lose weight if that is one of your goals. What should you know about eating carbs? Managing the amount of carbohydrate (carbs) you eat is an important part of healthy meals when you have diabetes. Carbohydrate is found in many foods. · Learn which foods have carbs. And learn the amounts of carbs in different foods. ? Bread, cereal, pasta, and rice have about 15 grams of carbs in a serving.  A serving is 1 slice of bread (1 ounce), ½ cup of cooked cereal, or 1/3 cup of cooked pasta or rice. ? Fruits have 15 grams of carbs in a serving. A serving is 1 small fresh fruit, such as an apple or orange; ½ of a banana; ½ cup of cooked or canned fruit; ½ cup of fruit juice; 1 cup of melon or raspberries; or 2 tablespoons of dried fruit. ? Milk and no-sugar-added yogurt have 15 grams of carbs in a serving. A serving is 1 cup of milk or 2/3 cup of no-sugar-added yogurt. ? Starchy vegetables have 15 grams of carbs in a serving. A serving is ½ cup of mashed potatoes or sweet potato; 1 cup winter squash; ½ of a small baked potato; ½ cup of cooked beans; or ½ cup cooked corn or green peas. · Learn how much carbs to eat each day and at each meal. A dietitian or CDE can teach you how to keep track of the amount of carbs you eat. This is called carbohydrate counting. · If you are not sure how to count carbohydrate grams, use the Plate Method to plan meals. It is a good, quick way to make sure that you have a balanced meal. It also helps you spread carbs throughout the day. ? Divide your plate by types of foods. Put non-starchy vegetables on half the plate, meat or other protein food on one-quarter of the plate, and a grain or starchy vegetable in the final quarter of the plate. To this you can add a small piece of fruit and 1 cup of milk or yogurt, depending on how many carbs you are supposed to eat at a meal.  · Try to eat about the same amount of carbs at each meal. Do not \"save up\" your daily allowance of carbs to eat at one meal.  · Proteins have very little or no carbs per serving. Examples of proteins are beef, chicken, turkey, fish, eggs, tofu, cheese, cottage cheese, and peanut butter. A serving size of meat is 3 ounces, which is about the size of a deck of cards. Examples of meat substitute serving sizes (equal to 1 ounce of meat) are 1/4 cup of cottage cheese, 1 egg, 1 tablespoon of peanut butter, and ½ cup of tofu. How can you eat out and still eat healthy?   · Learn to estimate the serving sizes of foods that have carbohydrate. If you measure food at home, it will be easier to estimate the amount in a serving of restaurant food. · If the meal you order has too much carbohydrate (such as potatoes, corn, or baked beans), ask to have a low-carbohydrate food instead. Ask for a salad or green vegetables. · If you use insulin, check your blood sugar before and after eating out to help you plan how much to eat in the future. · If you eat more carbohydrate at a meal than you had planned, take a walk or do other exercise. This will help lower your blood sugar. What else should you know? · Limit saturated fat, such as the fat from meat and dairy products. This is a healthy choice because people who have diabetes are at higher risk of heart disease. So choose lean cuts of meat and nonfat or low-fat dairy products. Use olive or canola oil instead of butter or shortening when cooking. · Don't skip meals. Your blood sugar may drop too low if you skip meals and take insulin or certain medicines for diabetes. · Check with your doctor before you drink alcohol. Alcohol can cause your blood sugar to drop too low. Alcohol can also cause a bad reaction if you take certain diabetes medicines. Follow-up care is a key part of your treatment and safety. Be sure to make and go to all appointments, and call your doctor if you are having problems. It's also a good idea to know your test results and keep a list of the medicines you take. Where can you learn more? Go to http://www.lim.com/  Enter I147 in the search box to learn more about \"Learning About Diabetes Food Guidelines. \"  Current as of: December 20, 2019               Content Version: 12.6  © 2214-2605 [employer]. Care instructions adapted under license by [employer] (which disclaims liability or warranty for this information).  If you have questions about a medical condition or this instruction, always ask your healthcare professional. [address] any warranty or liability for your use of this information. Patient Education        DASH Diet: Care Instructions  Your Care Instructions     The DASH diet is an eating plan that can help lower your blood pressure. DASH stands for Dietary Approaches to Stop Hypertension. Hypertension is high blood pressure. The DASH diet focuses on eating foods that are high in calcium, potassium, and magnesium. These nutrients can lower blood pressure. The foods that are highest in these nutrients are fruits, vegetables, low-fat dairy products, nuts, seeds, and legumes. But taking calcium, potassium, and magnesium supplements instead of eating foods that are high in those nutrients does not have the same effect. The DASH diet also includes whole grains, fish, and poultry. The DASH diet is one of several lifestyle changes your doctor may recommend to lower your high blood pressure. Your doctor may also want you to decrease the amount of sodium in your diet. Lowering sodium while following the DASH diet can lower blood pressure even further than just the DASH diet alone. Follow-up care is a key part of your treatment and safety. Be sure to make and go to all appointments, and call your doctor if you are having problems. It's also a good idea to know your test results and keep a list of the medicines you take. How can you care for yourself at home? Following the DASH diet  · Eat 4 to 5 servings of fruit each day. A serving is 1 medium-sized piece of fruit, ½ cup chopped or canned fruit, 1/4 cup dried fruit, or 4 ounces (½ cup) of fruit juice. Choose fruit more often than fruit juice. · Eat 4 to 5 servings of vegetables each day. A serving is 1 cup of lettuce or raw leafy vegetables, ½ cup of chopped or cooked vegetables, or 4 ounces (½ cup) of vegetable juice. Choose vegetables more often than vegetable juice.   · Get 2 to 3 servings of low-fat and fat-free dairy each day. A serving is 8 ounces of milk, 1 cup of yogurt, or 1 ½ ounces of cheese. · Eat 6 to 8 servings of grains each day. A serving is 1 slice of bread, 1 ounce of dry cereal, or ½ cup of cooked rice, pasta, or cooked cereal. Try to choose whole-grain products as much as possible. · Limit lean meat, poultry, and fish to 2 servings each day. A serving is 3 ounces, about the size of a deck of cards. · Eat 4 to 5 servings of nuts, seeds, and legumes (cooked dried beans, lentils, and split peas) each week. A serving is 1/3 cup of nuts, 2 tablespoons of seeds, or ½ cup of cooked beans or peas. · Limit fats and oils to 2 to 3 servings each day. A serving is 1 teaspoon of vegetable oil or 2 tablespoons of salad dressing. · Limit sweets and added sugars to 5 servings or less a week. A serving is 1 tablespoon jelly or jam, ½ cup sorbet, or 1 cup of lemonade. · Eat less than 2,300 milligrams (mg) of sodium a day. If you limit your sodium to 1,500 mg a day, you can lower your blood pressure even more. Tips for success  · Start small. Do not try to make dramatic changes to your diet all at once. You might feel that you are missing out on your favorite foods and then be more likely to not follow the plan. Make small changes, and stick with them. Once those changes become habit, add a few more changes. · Try some of the following:  ? Make it a goal to eat a fruit or vegetable at every meal and at snacks. This will make it easy to get the recommended amount of fruits and vegetables each day. ? Try yogurt topped with fruit and nuts for a snack or healthy dessert. ? Add lettuce, tomato, cucumber, and onion to sandwiches. ? Combine a ready-made pizza crust with low-fat mozzarella cheese and lots of vegetable toppings. Try using tomatoes, squash, spinach, broccoli, carrots, cauliflower, and onions. ?  Have a variety of cut-up vegetables with a low-fat dip as an appetizer instead of chips and dip.  ? Sprinkle sunflower seeds or chopped almonds over salads. Or try adding chopped walnuts or almonds to cooked vegetables. ? Try some vegetarian meals using beans and peas. Add garbanzo or kidney beans to salads. Make burritos and tacos with mashed dang beans or black beans. Where can you learn more? Go to http://www.lim.com/  Enter H967 in the search box to learn more about \"DASH Diet: Care Instructions. \"  Current as of: December 16, 2019               Content Version: 12.6  © 7360-8090 [employer]. Care instructions adapted under license by [employer] (which disclaims liability or warranty for this information). If you have questions about a medical condition or this instruction, always ask your healthcare professional. [address] any warranty or liability for your use of this information. DISCHARGE SUMMARY from Nurse    PATIENT INSTRUCTIONS:    After general anesthesia or intravenous sedation, for 24 hours or while taking prescription Narcotics:  · Limit your activities  · Do not drive and operate hazardous machinery  · Do not make important personal or business decisions  · Do  not drink alcoholic beverages  · If you have not urinated within 8 hours after discharge, please contact your surgeon on call.     Report the following to your surgeon:  · Excessive pain, swelling, redness or odor of or around the surgical area  · Temperature over 100.5  · Nausea and vomiting lasting longer than 4 hours or if unable to take medications  · Any signs of decreased circulation or nerve impairment to extremity: change in color, persistent  numbness, tingling, coldness or increase pain  · Any questions    What to do at Home:  Recommended activity: Activity as tolerated,     If you experience any of the following symptoms loss of balance, blurred vision, facial droop, arm numbness or weakness, slurred speech, please follow up with 911.    *  Please give a list of your current medications to your Primary Care Provider. *  Please update this list whenever your medications are discontinued, doses are      changed, or new medications (including over-the-counter products) are added. *  Please carry medication information at all times in case of emergency situations. These are general instructions for a healthy lifestyle:    No smoking/ No tobacco products/ Avoid exposure to second hand smoke  Surgeon General's Warning:  Quitting smoking now greatly reduces serious risk to your health. Obesity, smoking, and sedentary lifestyle greatly increases your risk for illness    A healthy diet, regular physical exercise & weight monitoring are important for maintaining a healthy lifestyle    You may be retaining fluid if you have a history of heart failure or if you experience any of the following symptoms:  Weight gain of 3 pounds or more overnight or 5 pounds in a week, increased swelling in our hands or feet or shortness of breath while lying flat in bed. Please call your doctor as soon as you notice any of these symptoms; do not wait until your next office visit. The discharge information has been reviewed with the patient. The patient verbalized understanding. Discharge medications reviewed with the patient and appropriate educational materials and side effects teaching were provided.   ___________________________________________________________________________________________________________________________________

## 2020-11-02 NOTE — ROUTINE PROCESS
Bedside and Verbal shift change report given to [employer] RN and [facility] RN (oncoming nurse) by Karlene Roberts (offgoing nurse). Report included the following information SBAR, Kardex, Intake/Output, MAR and Recent Results.

## 2020-11-02 NOTE — ROUTINE PROCESS
Stroke Education provided to patient and the following topics were discussed 1. Patients personal risk factors for stroke are hypertension, family history, diabetes mellitus and elevated HgA1C 
 
2. Warning signs of Stroke: * Sudden numbness or weakness of the face, arm or leg, especially on one side of The body * Sudden confusion, trouble speaking or understanding * Sudden trouble seeing in one or both eyes * Sudden trouble walking, dizziness, loss of balance or coordination * Sudden severe headache with no known cause 3. Importance of activation Emergency Medical Services ( 9-1-1 ) immediately if experience any warning signs of stroke. 4. Be sure and schedule a follow-up appointment with your primary care doctor or any specialists as instructed. 5. You must take medicine every day to treat your risk factors for stroke. Be sure to take your medicines exactly as your doctor tells you: no more, no less. Know what your medicines are for , what they do. Anti-thrombotics /anticoagulants can help prevent strokes. You are taking the following medicine(s)  aspirin, lipitor 6. Smoking and second-hand smoke greatly increase your risk of stroke, cardiovascular disease and death. Smoking history never 7. Information provided was [facility] Stroke Education Binder, Stroke Handouts or Verbal Education 8. Documentation of teaching completed in Patient Education Activity and on Care Plan with teaching response noted? yes

## 2020-11-02 NOTE — PROGRESS NOTES
Reason for Admission:  Left sided numbness [R20.0]                 RUR Score:    10            Plan for utilizing home health:    no                      Likelihood of Readmission:   LOW                         Transition of Care Plan:              Initial assessment completed with patient. Cognitive status of patient: oriented to time, place, person and situation. Face sheet information confirmed:  yes. The patient designates  to participate in her discharge plan and to receive any needed information. This patient lives in a single family home with patient and spouse. Patient is able to navigate steps as needed. Prior to hospitalization, patient was considered to be independent with ADLs/IADLS : yes . Patient has a current ACP document on file: no  The patient and  will be available to transport patient home upon discharge. The patient already has none reported, medical equipment available in the home. Patient is not currently active with home health. Patient has not stayed in a skilled nursing facility or rehab. This patient is on dialysis :no     Freedom of choice signed: no, for Patient still works. Currently, the discharge plan is Home. The patient states that she can obtain her medications from the pharmacy, and take her medications as directed. Patient's current insurance is Medicaid       Care Management Interventions  PCP Verified by CM:  Yes  Mode of Transport at Discharge: Self  Current Support Network: Lives with Spouse  Confirm Follow Up Transport: Family  The Plan for Transition of Care is Related to the Following Treatment Goals : Home  Discharge Location  Discharge Placement: Home with outpatient services        Ryan Golden RN BSN  Care Manager  [phone]

## 2020-11-02 NOTE — PROGRESS NOTES
Physician Progress Note      Luanne Oliver  CSN #:                  [id number]  :                       1970  ADMIT DATE:       2020 11:15 PM  [address] [place] DATE:  RESPONDING  PROVIDER #:        Asael LOPEZ MD          QUERY TEXT:    Pt admitted with   left sided weakness . Pt noted to have abnormal  UA. If possible, please document in the progress notes and discharge summary if you are evaluating and/or treating any of the following: The medical record reflects the following:    Risk Factors: diabetes with neuropathy    Clinical Indicators:  UA-  + nitrites, trace leukocytes with 4+ bacteria. Treatment: pt on Bactrim   1 po  q12h  no urine cx ordered    Thank you,    Ryan Saba   RN   CCDS  x [phone]  Options provided:  -- Urinary Tract Infection (UTI)  -- Bacteriuria  -- Other - I will add my own diagnosis  -- Disagree - Not applicable / Not valid  -- Disagree - Clinically unable to determine / Unknown  -- Refer to Clinical Documentation Reviewer    PROVIDER RESPONSE TEXT:    This patient has bacteriuria.     Query created by: Sania Harris on 2020 2:20 PM      Electronically signed by:  Asael Moreno MD 2020 3:13 PM

## 2020-11-02 NOTE — DIABETES MGMT
Glycemic Control Plan of Care    Assessment: admitted overnight with left sided weakness/numbness. Confirmed her home DM medications listed below - she uses a FreeStyle Sabrina CGM which she removed last night prior to admission. Usually consumes 3 meals/day.   Old A1c from 2019 -  New A1c pending  Will continue to monitor     Most recent blood glucose values:        A1C: from 2019 - new A1c pending   Lab Results   Component Value Date/Time    Hemoglobin A1c 13.4 (H) 03/28/2019 05:21 PM    Hemoglobin A1c (POC) 13.4 11/21/2019 04:50 PM     Current hospital diabetes medications:  Lantus 25 units daily  Corrective lispro, very insulin resistant, 4 times daily     TTD previous day: admitted overnight     Home diabetes medications:  Levemir 40 units 3 times daily   Metformin 1,000 mg bid    Goals:  Blood glucose will be within target range of  mg/dL by 11/4/2020     Education:  Will continue to monitor - education pending A1c      Kathie Coronel MPH RN CDE  Pager [phone]

## 2020-11-02 NOTE — PROGRESS NOTES
D/C orders received. No needs identified by . Chart reviewed. Pt will be transported home by family.  available as needed.     Bob Bergeron, RN BSN  Care Manager  [phone]

## 2020-11-02 NOTE — DISCHARGE SUMMARY
Discharge Summary     Patient ID:  Enrique Patel  [id number]  1 y.o.  1970  Body mass index is 26.31 kg/m². PCP on record: Migdalia Romero MD    Admit date: 11/1/2020  Discharge date and time: 11/2/2020    Discharge Diagnoses:                                           1 TIA   2 HTN   3 DM2 - uncontrolled high blood glucose   4 hypomagnesemia   5 Bacteriuria  6 Migraine type headache    7 HLD   8 Hypothyroidism       Consults: Neurology          Hospital Course by problems:  HPI per admitting MD \" Enrique Patel is a 44 y.o. female with a past medical history of diabetes type 2, insulin-dependent, complicated by peripheral neuropathy, migraine, hypertension, dyslipidemia, family history of multiple sclerosis who several days ago began to experience numbness intermittently to the left side of her body, unlike her migraines. BP noted 197/99. This was associated with left sided weakness. Patient states she is fallen several times at home patient reports history of migraine, poorly controlled, she did not tolerate sumatriptan as it gave her nausea and vomiting. She does not have a neurologist in the community. She gets a migraine 2-3 times weekly, characterized by light hurting her eyes, noise hurting her ears, and movement making it worse. She in fact has a migraine at my interview, but states it is mild. She denies any recent fevers or chills, no change to her taste or smell, no cough, no shortness of breath, no known Covid exposure. She has 2 cousins with multiple sclerosis. Her mom has Alzheimer's dementia. In the ED, head CT was negative. CTA head and neck done, report pending. She was evaluated by teleneurology. Upon neurologist's review of CT angiogram, there was no large vessel occlusion, and no evidence of critical stenosis within the basilar artery. Official report pending. Teleneurologist reports she is outside the time window for TPA or thrombectomy.   Recommended for admission, [facility] stroke order sets. Aspirin and Plavix recommended. \"    ++++++++++++++++++++++++++++++++++++++++++++++        Patient seen and examined by me on discharge day. Pertinent Findings:  Stable    Significant Diagnostic Studies:  Results   CTA HEAD NECK W WO CONT (Accession [id number]) (Order [id number])   Allergies      Medium: Sumatriptan    Not Specified: Vicodin [Hydrocodone-acetaminophen]    Exam Information     Status  Exam Begun   Exam Ended     Final [99]  11/02/2020  03:07  11/02/2020  03:08    Result Information     Status: Final result (Exam End: 11/2/2020 03:08)  Provider Status: Open    Study Result     CTA NECK AND BRAIN     CPT CODE: 27309,57202     REASON FOR EXAM: ? CVA  HISTORY: Numbness and tingling right-sided body for 6 days.     COMPARISON: Head CT without contrast 11/2/2020.     TECHNIQUE: Helical CT scan of the brain and neck were performed during rapid IV  bolus contrast administration. These data were reconstructed for vascular  analysis. 3-D postprocessed images, including surface shaded displays, were  produced on a dedicated workstation, permanently archived, and interpreted. CT dose reduction was achieved through use of a standardized protocol tailored  for this examination and automatic exposure control for dose modulation.      CONTRAST: 100  mL Isovue 370 iodinated contrast IV.        CTA SOFT TISSUE ANALYSIS: Lung apices are clear. The mucosal surfaces of the  aerodigestive tract are unremarkable. The salivary glands are normal.   Approximately 1 cm low-attenuation nodule in the right thyroid lobe. Sub-5 mm  low-attenuation nodules in both thyroid lobes. Imaging through the upper  mediastinum shows some hazy nonmasslike soft tissue in the anterior mediastinum  that may be some residual thymus. No focal osseous pathology seen. No  significant cervical central canal stenosis seen. Paranasal sinuses are clear.      Normal brain attenuations.  No pathologic enhancement or mass lesion. Normal CSF  spaces. No mass effect or midline shift.      CTA NECK VASCULAR ANALYSIS:   Aortic arch: Typical arch anatomy. No arch stenosis or aneurysm. Innominate artery: Patent, no stenosis. Right subclavian artery: Patent, no stenosis. Left subclavian artery: Patent, no stenosis.     Right carotid:  Right CCA: No stenosis  Right ICA origin: Mild plaque, no stenosis. 0% diameter stenosis BENJI by NASCET  criteria. Right ICA: Patent to the skull base.     Left carotid:  Left CCA: CCA origin is partially obscured by artifact from contrast remaining  in the innominate vein. Otherwise the CCA is patent to the bifurcation, no  stenosis. Left ICA origin: No stenosis. 0% diameter stenosis LICA by NASCET criteria. Left ICA: Patent to the skull base.     Right vertebral artery: Patent. No stenosis.     Left vertebral artery: Patent. No stenosis.     CTA BRAIN VASCULAR ANALYSIS:      RIGHT ANTERIOR CIRCULATION: Patent    Distal ICA: No stenosis. MCA: No stenosis. MARY KAY: No stenosis.     ACOM:     LEFT ANTERIOR CIRCULATION: Patent  Distal ICA: No stenosis. MCA: No stenosis. MARY KAY: No stenosis.     POSTERIOR CIRCULATION:   RVA: Patent. No stenosis. LVA: Patent. No stenosis. Basilar: Patent. No stenosis. Right PCA: Patent. No stenosis. Left PCA: Patent. No stenosis.       IMPRESSION  IMPRESSION:      1. Patent intracranial circulation. No large vessel occlusion. No significant  intracranial stenosis. 2. Patent cervical carotid and vertebral arteries, no significant stenosis. 0%  NASCET ICA stenosis bilaterally. 3. Heterogeneous thyroid gland with subcentimeter low-attenuation nodule noted  on the right. Multiple very small nodules in bilateral thyroid lobes.  Similar  findings described on thyroid ultrasound from 2016.  4. Small amount of hazy nonmasslike soft tissue in the anterior mediastinum is  partially imaged, suspected residual thymus.      Imaging     CTA HEAD NECK W WO CONT (Order: [id number]) - 11/2/2020   Result History     CTA HEAD NECK W WO CONT (Order #[id number]) on 11/2/2020      ________________________________    Results   MRI BRAIN WO CONT (Accession [id number]) (Order [id number])   Allergies      Medium: Sumatriptan    Not Specified: Vicodin [Hydrocodone-acetaminophen]    Exam Information     Status  Exam Begun   Exam Ended     Final [99]  11/02/2020  09:27  11/02/2020  09:59    Result Information     Status: Final result (Exam End: 11/2/2020 09:59)  Provider Status: Open    Study Result     MRI BRAIN WITHOUT CONTRAST     PROVIDED REASON FOR EXAM: concern for stroke. thanks  Additional History: History of type 2 diabetes, presents with intermittent  left-sided numbness  Comparison Studies: Head CT 11/2/2020, no prior brain MRI available     Imaging Technique:     Sequences:  Sagittal,  Coronal and axial T1 weighted, Diffusion Weighted  (DWI), Axial T2 weighted, Axial T2 FLAIR, and SWI/GRE MRI images of the brain.     Contrast Material:  NONE     Limiting Factors/Major Artifacts: None.     FINDINGS:     Brain Parenchyma: Negative for acute infarct. Trace amount of increased T2  FLAIR signal in the periventricular white matter likely minimal chronic small  vessel ischemic changes. No chronic cortical infarcts or chronic lacunar  infarcts. No visible masses or midline shift.     CSF Spaces:  Normal in size and morphology for the patient's age. No  hydrocephalus.     Vascular System:  Grossly patent flow in basilar and internal cerebral arteries. No findings of dural sinus thrombosis.      Hemorrhage:  No acute hemorrhage. No chronic microhemorrhage.     Other Structures:     Calvarium: No suspicious marrow signal.     Sella: Pituitary is not enlarged. Visualized Upper Cervical Spine: Normal.  Orbits: Normal for non-dedicated exam.  Paranasal Sinuses: No significant paranasal sinus disease. Mastoid Air Cells:  Clear.     IMPRESSION  IMPRESSION:     No acute intracranial abnormality. No mass, hemorrhage, or acute infarct.     Mild/minimal burden of increased T2 FLAIR signal in the periventricular white  matter about the frontal horns and atria of the lateral ventricles. Most likely  minimal chronic small vessel ischemic changes related to chronic hypertension  and diabetes.         Imaging     MRI BRAIN WO CONT (Order: [id number]) - 11/2/2020   Result History     MRI BRAIN WO CONT (Order #[id number]) on 11/2/2020 - Order Result History Report          Pertinent Lab Data:  Recent Labs     11/01/20  2331   WBC 5.4   HGB 12.1   HCT 37.2        Recent Labs     11/01/20  2331      K 3.7      CO2 31   *   BUN 11   CREA 0.87   CA 9.0   MG 1.5*   ALB 3.6   ALT 26       DISCHARGE MEDICATIONS:   @  Current Discharge Medication List      START taking these medications    Details   aspirin (ASPIRIN) 325 mg tablet Take 1 Tab by mouth daily. Qty: 30 Tab, Refills: 0      trimethoprim-sulfamethoxazole (BACTRIM DS, SEPTRA DS) 160-800 mg per tablet Take 1 Tab by mouth every twelve (12) hours. Qty: 5 Tab, Refills: 0         CONTINUE these medications which have CHANGED    Details   atorvastatin (LIPITOR) 80 mg tablet Take 1 Tab by mouth nightly.  Indications: excessive fat in the blood  Qty: 30 Tab, Refills: 0         CONTINUE these medications which have NOT CHANGED    Details   levothyroxine (SYNTHROID) 100 mcg tablet TAKE 1 TABLET BY MOUTH DAILY BEFORE BREAKFAST FOR HYPOTHYROIDISM  Qty: 90 Tab, Refills: 0    Associated Diagnoses: Hypothyroidism, unspecified type      FreeStyle Sabrina 14 Day Sensor kit USE TO CHECK BLOOD SUGAR BEFORE BREAKFAST, LUNCH AND DINNER AND AT BEDTIME  Qty: 5 Kit, Refills: 2    Associated Diagnoses: Type 2 diabetes mellitus without complication, with long-term current use of insulin ([facility])      Levemir U-100 Insulin 100 unit/mL injection ADMINISTER 40 UNITS UNDER THE SKIN TWICE DAILY  Qty: 20 mL, Refills: 11    Associated Diagnoses: Type 2 diabetes mellitus without complication, with long-term current use of insulin ([facility])      flash glucose scanning reader (FreeStyle Sabrina 14 Day [place]) misc 1 Each by Does Not Apply route Before breakfast, lunch, dinner and at bedtime. Qty: 1 Each, Refills: 5    Associated Diagnoses: Type 2 diabetes mellitus without complication, with long-term current use of insulin ([facility])      metFORMIN (GLUCOPHAGE) 1,000 mg tablet Take 1 Tab by mouth two (2) times daily (with meals). Indications: type 2 diabetes mellitus  Qty: 60 Tab, Refills: 11    Associated Diagnoses: Type 2 diabetes mellitus without complication, with long-term current use of insulin ([facility])      lisinopril (PRINIVIL, ZESTRIL) 20 mg tablet Take 1 Tab by mouth daily. Qty: 30 Tab, Refills: 2    Associated Diagnoses: Essential hypertension      Insulin Needles, Disposable, (PEN NEEDLE, DIABETIC) 31 gauge x 1/4\" ndle Use twice daily for insulin injections  Qty: 90 Pen Needle, Refills: 2    Associated Diagnoses: Insulin dependent diabetes mellitus      Blood-Glucose Meter monitoring kit Test twice daily  Qty: 1 Kit, Refills: 0    Associated Diagnoses: Type 2 diabetes, controlled, with neuropathy ([facility])      glucose blood VI test strips (BLOOD GLUCOSE TEST) strip Use twice daily as directed, to match her glucometer  Qty: 100 Strip, Refills: 11    Associated Diagnoses: Type 2 diabetes, controlled, with neuropathy ([facility])      cetirizine (ZYRTEC) 10 mg tablet Take 1 Tab by mouth daily. Qty: 90 Tab, Refills: 3    Associated Diagnoses: Chronic allergic rhinitis      albuterol (PROVENTIL HFA, VENTOLIN HFA, PROAIR HFA) 90 mcg/actuation inhaler Take 2 puffs by inhalation every four (4) hours as needed for Wheezing. Qty: 1 Inhaler, Refills: 0      gabapentin (NEURONTIN) 300 mg capsule TAKE 1 CAPSULE BY MOUTH TWICE DAILY. MAX DAILY AMOUNT: 600 MG  Qty: 60 Cap, Refills: 2    Associated Diagnoses: Type 2 diabetes mellitus without complication, with long-term current use of insulin ([address]  Neuropathy insulin aspart U-100 (NOVOLOG) 100 unit/mL (3 mL) inpn <150 BS/0 units, 151-200/2 units, 201-250/4 units, 251-300/6 units, 301-350/8 units, >351-400/10 units and call provider  Qty: 5 Adjustable Dose Pre-filled Pen Syringe, Refills: 3    Associated Diagnoses: Type 2 diabetes mellitus without complication, with long-term current use of insulin ([facility])      fluticasone propion-salmeterol (ADVAIR/WIXELA) 250-50 mcg/dose diskus inhaler Take 1 Puff by inhalation daily. Qty: 1 Inhaler, Refills: 3    Associated Diagnoses: Asthma in adult, moderate persistent, uncomplicated      acetaminophen (TYLENOL) 325 mg tablet Take 2 Tabs by mouth every six (6) hours as needed for Pain. Qty: 20 Tab, Refills: 0      polyethylene glycol (MIRALAX) 17 gram/dose powder 1 tablespoon with 8 oz of water every 2 hours for 3 doses. 1 tablespoon with 8 oz of water twice daily until stools are soft and regular. 1 tablespoon with 8 oz of water once daily for maintenance. Qty: 600 g, Refills: 0      SUMAtriptan (IMITREX) 50 mg tablet Take 1 at onset of migraine - may repeat 1 tab by mouth in 2 hrs if needed  Qty: 10 Tab, Refills: 11    Associated Diagnoses: Intractable migraine without aura and without status migrainosus               My Recommended Diet, Activity, Wound Care, and follow-up labs are listed in the patient's Discharge Insturctions which I have personally completed and reviewed. Disposition:     [x] Home with family     [] [facility] [place] PT/RN   [] SNF/NH   [] Inpatient Rehab/[facility]  Condition at Discharge:  Stable    Follow up with:   PCP : Other, Migdalia, MD      Please follow-up tests/labs that are still pendin. None  2.    >30 minutes spent coordinating this discharge (review instructions/follow-up, prescriptions, preparing report for sign off)    Disclaimer: Sections of this note are dictated utilizing voice recognition software, which may have resulted in some phonetic based errors in grammar and contents.  Even though attempts were made to correct all the mistakes, some may have been missed, and remained in the body of the document. If questions arise, please contact our department.     Signed:  Rachelle Gaona MD  11/2/2020  1:11 PM

## 2020-11-02 NOTE — ED NOTES
Patient expressing her symptoms for the past couple of days. Per patient, \"I went to get my nails done a couple of days ago, and I could not feel anything the nail lady was doing on my left side. \"

## 2020-11-02 NOTE — PROGRESS NOTES
Chart entered for gabapentin prescription, 2 weeks supply ordered. Work note given return to work on 11/9.     Hugo Lacy PA-C  11/02/20

## 2020-11-02 NOTE — PROGRESS NOTES
Problem: Falls - Risk of  Goal: *Absence of Falls  Description: Document Terri Weinstein Fall Risk and appropriate interventions in the flowsheet.   Outcome: Progressing Towards Goal  Note: Fall Risk Interventions:            Medication Interventions: Patient to call before getting OOB, Teach patient to arise slowly         History of Falls Interventions: Door open when patient unattended         Problem: Deep Venous Thrombosis - Risk of  Goal: *Absence of deep venous thrombosis signs and symptoms(Stroke Metric)  Outcome: Progressing Towards Goal  Goal: *Absence of impaired coagulation signs and symptoms  Outcome: Progressing Towards Goal  Goal: *Knowledge of prescribed medications  Outcome: Progressing Towards Goal  Goal: *Absence of bleeding  Outcome: Progressing Towards Goal     Problem: TIA/CVA Stroke: 0-24 hours  Goal: Off Pathway (Use only if patient is Off Pathway)  Outcome: Progressing Towards Goal  Goal: Activity/Safety  Outcome: Progressing Towards Goal  Goal: Consults, if ordered  Outcome: Progressing Towards Goal  Goal: Diagnostic Test/Procedures  Outcome: Progressing Towards Goal  Goal: Nutrition/Diet  Outcome: Progressing Towards Goal  Goal: Discharge Planning  Outcome: Progressing Towards Goal  Goal: Medications  Outcome: Progressing Towards Goal  Goal: Respiratory  Outcome: Progressing Towards Goal  Goal: Treatments/Interventions/Procedures  Outcome: Progressing Towards Goal  Goal: Minimize risk of bleeding post-thrombolytic infusion  Outcome: Progressing Towards Goal  Goal: Monitor for complications post-thrombolytic infusion  Outcome: Progressing Towards Goal  Goal: Psychosocial  Outcome: Progressing Towards Goal  Goal: *Hemodynamically stable  Outcome: Progressing Towards Goal  Goal: *Neurologically stable  Description: Absence of additional neurological deficits    Outcome: Progressing Towards Goal  Goal: *Verbalizes anxiety and depression are reduced or absent  Outcome: Progressing Towards Goal  Goal: *Absence of Signs of Aspiration on Current Diet  Outcome: Progressing Towards Goal  Goal: *Absence of deep venous thrombosis signs and symptoms(Stroke Metric)  Outcome: Progressing Towards Goal  Goal: *Ability to perform ADLs and demonstrates progressive mobility and function  Outcome: Progressing Towards Goal  Goal: *Stroke education started(Stroke Metric)  Outcome: Progressing Towards Goal  Goal: *Dysphagia screen performed(Stroke Metric)  Outcome: Progressing Towards Goal  Goal: *Rehab consulted(Stroke Metric)  Outcome: Progressing Towards Goal     Problem: Pain  Goal: *Control of Pain  Outcome: Progressing Towards Goal

## 2020-11-02 NOTE — HOME CARE
Rounded on this \"[facility]' patient , explained to patient about [facility] services offered  and answered all questions; [facility] will be available if needed. SHABNAM DENSON.

## 2020-11-02 NOTE — ED NOTES
Patient came in complaining of left side numbness and tingling for the past 6 days. Patient also stated that she has been having chest pain and SOB for the past day. Patient denies any chest pain at this time.

## 2020-11-02 NOTE — PROGRESS NOTES
OCCUPATIONAL THERAPY EVALUATION/DISCHARGE    Patient: Yakelin Burt (68 y.o. female)  Date: 2020  Primary Diagnosis: Left sided numbness [R20.0]        Precautions: Fall    PLOF: Pt was independent with basic self care tasks and functional mobility PTA. ASSESSMENT AND RECOMMENDATIONS:  Based on the objective data described below, the patient is able to perform basic self care tasks without assistance but she is limited by LUE numbness and decreased finger ROM/strength. Supervision given for functional standing and transfers. Will defer to PT for mobility training as needed. Patient educated on gentle active and active assistive ROM stretches/exercises to increase functional use of her left hand. She verbalized/demosntrated understanding. Education also provided on sensory stimulation and weightbearing on LUE for increased function. She again verbalized and demonstrated understanding. Patient left supine in bed at end of session with all needs met. She has a supportive family at home to assist her prn. No DME needs identified. Skilled occupational therapy is not indicated at this time. Discharge Recommendations: None  Further Equipment Recommendations for Discharge: N/A      SUBJECTIVE:   Patient stated I've been exercising my hand for the last two weeks.     OBJECTIVE DATA SUMMARY:     Past Medical History:   Diagnosis Date    Asthma     Bronchitis     Cataract     Diabetes ([address]     Endocrine disease     hyperthyroidism    Endometriosis     Hypertension     Irregular bleeding     Migraine     Pelvic pain     Thyroid disease      Past Surgical History:   Procedure Laterality Date    HX  SECTION      x 3    HX COLONOSCOPY      HX GYN      partial hysterectomy    HX GYN      cervical laparoscopy; endometriosis    HX HYSTERECTOMY       Barriers to Learning/Limitations: None  Compensate with: visual, verbal, tactile, kinesthetic cues/model    Home Situation:   Home Situation  Home Environment: Private residence  # Steps to Enter: 4  One/Two Story Residence: Two story  # of Interior Steps: 13  Interior Rails: Both  Living Alone: No  Support Systems: Family member(s)  Patient Expects to be Discharged to[de-identified] Private residence  Current DME Used/Available at Home: None  Tub or Shower Type: Tub/Shower combination  [x]     Right hand dominant   []     Left hand dominant    Cognitive/Behavioral Status:  Neurologic State: Alert  Orientation Level: Oriented X4  Cognition: Appropriate decision making; Follows commands  Safety/Judgement: Awareness of environment; Fall prevention    Skin: Intact on UEs  Edema: None noted in UEs    Vision/Perceptual:    Acuity: Within Defined Limits      Coordination: BUE  Coordination: Within functional limits  Fine Motor Skills-Upper: Left Impaired;Right Intact    Gross Motor Skills-Upper: Left Intact; Right Intact    Balance:  Sitting: Intact  Standing: Impaired; Without support    Strength: BUE  Strength: Generally decreased, functional(LUE, 3/5 in hand; RUE WFL)    Tone & Sensation: BUE  Tone: Normal  Sensation: Impaired(LUE impaired to light touch, localization intact)    Range of Motion: BUE  AROM: Generally decreased, functional(WFL except for digits on L hand, decreased flexion)    Functional Mobility and Transfers for ADLs:  Bed Mobility:  Supine to Sit: Modified independent  Sit to Supine: Modified independent  Scooting: Modified independent  Transfers:  Sit to Stand: Stand-by assistance  Stand to Sit: Stand-by assistance    Toilet Transfer : Supervision     ADL Assessment:  Feeding: Modified independent    Oral Facial Hygiene/Grooming: Modified Independent    Bathing: Modified independent    Upper Body Dressing: Modified independent    Lower Body Dressing: Modified independent    Toileting: Modified independent    ADL Intervention:  Patient ambulated to the bathroom with supervision for toileting.   She was able to manage clothing and perform hygiene with modified independence. Grooming at sink completed with modified independence as well. Cognitive Retraining  Safety/Judgement: Awareness of environment; Fall prevention    Pain:  Pain level pre-treatment: 0/10   Pain level post-treatment: 0/10   Pain Intervention(s): NA  Response to intervention: NA    Activity Tolerance:   Good  Please refer to the flowsheet for vital signs taken during this treatment. After treatment:   []  Patient left in no apparent distress sitting up in chair  [x]  Patient left in no apparent distress in bed  [x]  Call bell left within reach  [x]  Nursing notified  []  Caregiver present  []  Bed alarm activated    COMMUNICATION/EDUCATION:   [x]      Role of Occupational Therapy in the acute care setting  [x]      Home safety education was provided and the patient/caregiver indicated understanding. [x]      Patient/family have participated as able and agree with findings and recommendations. []      Patient is unable to participate in plan of care at this time. Thank you for this referral.  Trae Park MS OTR/L   Time Calculation: 24 mins      Eval Complexity: History: LOW Complexity : Brief history review ; Examination: LOW Complexity : 1-3 performance deficits relating to physical, cognitive , or psychosocial skils that result in activity limitations and / or participation restrictions ;    Decision Making:LOW Complexity : No comorbidities that affect functional and no verbal or physical assistance needed to complete eval tasks

## 2020-11-03 ENCOUNTER — PATIENT OUTREACH (OUTPATIENT)
Dept: CASE MANAGEMENT | Age: 50
End: 2020-11-03

## 2020-11-03 NOTE — PROGRESS NOTES
Patient was admitted to [facility] on 2020 and discharged on 2020 for left sided numbness (TIA). Outreach made within 2 business days of discharge: Yes    Top Discharge Challenges to be reviewed by the provider   Additional needs identified to be addressed with provider no  none  Discussed COVID-19 related testing which was not done at this time. Test results were not done. Patient informed of results, if available? n/a   Method of communication with provider : none       Advance Care Planning:   Does patient have an Advance Directive:  health care decision makers updated    Inpatient Readmission Risk score: 5%  Was this a readmission? no   Patient stated reason for the admission: left sided numbness  Patients top risk factors for readmission: functional physical ability, medical condition, medication management and support system  Interventions to address risk factors: follow up appointments with physicians    Care Transition Nurse (CTN) contacted the patient by telephone to perform post hospital discharge assessment. Verified name and  with patient as identifiers. Provided introduction to self, and explanation of the CTN role. CTN reviewed discharge instructions, medical action plan and red flags with patient who verbalized understanding. Patient given an opportunity to ask questions and does not have any further questions or concerns at this time. The patient agrees to contact the PCP office for questions related to their healthcare. Medication reconciliation was performed with patient, who verbalizes understanding of administration of home medications. Advised obtaining a 90-day supply of all daily and as-needed medications.    Referral to Pharm D needed: no     Home Health/Outpatient orders at discharge: [address]: n/a  Date of initial visit: [facility] ordered at discharge: none  [address]: n/a  Durable Medical Equipment received: n/a    Covid Risk Education    Patient has following risk factors of: asthma, diabetes and HTN, migraines, hypothyroid, HLD. Education provided regarding infection prevention, and signs and symptoms of COVID-19 and when to seek medical attention with patient who verbalized understanding. Discussed exposure protocols and quarantine From CDC: Are you at higher risk for severe illness?  and given an opportunity for questions and concerns. The patient agrees to contact the COVID-19 hotline [phone] or PCP office for questions related to COVID-19. For more information on steps you can take to protect yourself, see CDC's How to Protect Yourself     Patient/family/caregiver given information for [employer] and agrees to enroll no  Patient's preferred e-mail: [email or web address]  Patient's preferred phone number: [phone]    Discussed follow-up appointments. If no appointment was previously scheduled, appointment scheduling offered: yes  [address] follow up appointment(s):   Future Appointments   Date Time Provider Mariaelena Quesada   11/11/2020 10:00 AM MD EDIS [facility] AMB   11/17/2020 11:00 AM Lisa Meehan NP [facility] AM[facility]     Non-BS follow up appointment(s): n/a  Plan for follow-up call in 5-7 days based on severity of symptoms and risk factors. CTN provided contact information for future needs. Goals Addressed                 This Visit's Progress     Prevent complications post hospitalization. 1. CTN will monitor X 4 weeks    2. Ensure provider appt is scheduled within 7 days post-discharge 11/3/2020 Patient has appt with PCP 11/11/2020 at 10 am- virtually      3. Confirm patient attended post-discharge provider apt    4. Complete post-visit call to confirm attendance and update care needs  11/3/2020 Patient stated that she is doing ok today. She still has numbness on left side.  She stated that it is getting better with being placed back on medications that she ran out of. No care needs at present time. 5. Review/educate common or potential \"red flags\" of condition worsening 11/3/2020 Reviewed signs symptoms of TIA such as numbness, confusion, sight problems, headaches, walking problems, unable to speak, drooping on one side of face, unable to raise arm on one side. Just to name a few. 6. Evaluate adherence to medications and priority barriers to resolve  11/3/2020 Patient stated that she has all meds and is taking as directed. 7. Instruct on adherence to medications as ordered and assess for therapeutic response and side-effects   11/3/2020 Patient stated that she is aware of why she is taking each med and she verbalized understanding of side effects and when she needed to call the doctor. 8. Discuss and evaluate ADL performance. Provide recommendations on energy conservation, particularly related to transition home from an inpatient admission. 11/3/2020 Patient stated that she is able to get around without any assistive devices. She stated that numbness is improving slightly due to getting back on meds.    9.

## 2020-11-10 ENCOUNTER — PATIENT OUTREACH (OUTPATIENT)
Dept: CASE MANAGEMENT | Age: 50
End: 2020-11-10

## 2020-11-10 NOTE — PROGRESS NOTES
Transitions of Care Coordination  Follow-Up    Date/Time:  11/10/2020 3:08 PM     CTN (Care Transitions Nurse) contacted patient for Transitions of Care Coordination  follow up. Verified 2 patient identifiers (name and ). Spoke to patient. Introduced self/role and reason for call. Patient reported:   Patient stated that she is back to work. She stated that she has all meds and is taking as directed. She stated that she has continued numbness. Pertinent negatives:  Continued numbness which affects her quality of life    Specialist appointments since last outreach? no  If so, specialist and date: n/a    Medications:   New medications since last outreach: no  Does patient need refills on any medications: no  Medication changes since last outreach (dose adjustments or discontinued meds): no    Home Health company: n/a  Date of discharge: none    Barriers to care?  functional physical ability, medical condition, PCP relationship, support system        Patient reminded that there are physicians on call 24 hours a day / 7 days a week (M-F 5pm to 8am and from Friday 5pm until Monday 8a for the weekend) should the patient have questions or concerns. Future Appointments   Date Time Provider Mariaelena Quesada   2020 11:00 AM [place] Dudley BS AMB        Other upcoming appointments:  n/a    Goals      Prevent complications post hospitalization. 1. CTN will monitor X 4 weeks    2. Ensure provider appt is scheduled within 7 days post-discharge 11/3/2020 Patient has appt with PCP 2020 at 10 am- virtually      3. Confirm patient attended post-discharge provider apt    4. Complete post-visit call to confirm attendance and update care needs  11/3/2020 Patient stated that she is doing ok today. She still has numbness on left side. She stated that it is getting better with being placed back on medications that she ran out of. No care needs at present time.  11/10/2020 Patient stated that she is still having the numbness and needs to be seen face to face. She feels like the virtual appointment is a waste of insurance money. Placed patient name on [employer] list to see if she could assist.     5. Review/educate common or potential \"red flags\" of condition worsening 11/3/2020 Reviewed signs symptoms of TIA such as numbness, confusion, sight problems, headaches, walking problems, unable to speak, drooping on one side of face, unable to raise arm on one side. Just to name a few. 6. Evaluate adherence to medications and priority barriers to resolve  11/3/2020 Patient stated that she has all meds and is taking as directed. 11/10/2020 Patient stated that she does have meds and is taking per physician. 7. Instruct on adherence to medications as ordered and assess for therapeutic response and side-effects   11/3/2020 Patient stated that she is aware of why she is taking each med and she verbalized understanding of side effects and when she needed to call the doctor. 11/10/2020 Patient stated that she has no concerns about meds at present. 8. Discuss and evaluate ADL performance. Provide recommendations on energy conservation, particularly related to transition home from an inpatient admission. 11/3/2020 Patient stated that she is able to get around without any assistive devices. She stated that numbness is improving slightly due to getting back on meds. 11/10/2020 Patient stated that she continues to have numbness. She has returned to work.

## 2020-11-10 NOTE — PROGRESS NOTES
11/10/2020 2:35 PM     CTN attempted to contact patient for hospital follow up. Patient admitted to [facility] on 11/1/2020 and discharged on 11/2/2020 for left sided numbness. Message left introducing myself, the purpose of the call and giving my contact information. Requested that patient call back at her earliest convenience.

## 2020-11-17 ENCOUNTER — PATIENT OUTREACH (OUTPATIENT)
Dept: CASE MANAGEMENT | Age: 50
End: 2020-11-17

## 2020-11-17 NOTE — PROGRESS NOTES
11/17/2020 9:50 AM    Attempted to call patient for routine follow up. She was unavailable at time of call. Left message for patient to return my call when she was available. CTN contact information given in message. Will follow.

## 2020-11-24 ENCOUNTER — PATIENT OUTREACH (OUTPATIENT)
Dept: CASE MANAGEMENT | Age: 50
End: 2020-11-24

## 2020-11-24 NOTE — PROGRESS NOTES
11/24/2020 10:52 AM AM    Attempted to call patient for routine follow up. She was unavailable at time of call. A message came on that patient was not accepting calls at first number and unable to leave a message. Left message for patient to return my call on other numbers on chart when she was available. CTN contact information given in message. Will follow.

## 2020-12-02 ENCOUNTER — PATIENT OUTREACH (OUTPATIENT)
Dept: CASE MANAGEMENT | Age: 50
End: 2020-12-02

## 2020-12-02 NOTE — PROGRESS NOTES
Transitions of Care Coordination  Follow-Up    Date/Time:  2020 8:40 AM     CTN (Care Transitions Nurse) contacted patient for Transitions of Care Coordination  follow up. Verified 2 patient identifiers (name and ). Spoke to patient. Introduced self/role and reason for call. Patient reported:   Patient is back to all her ADL's. She is back to work. Patient has all meds and is taking as directed. No complaints at present. Pertinent negatives:  None per patient    Specialist appointments since last outreach? no  If so, specialist and date: n/a    Medications:   New medications since last outreach: no  Does patient need refills on any medications: no  Medication changes since last outreach (dose adjustments or discontinued meds): no    Home Health company: none  Date of discharge: n/a    Barriers to care? medical condition        Patient reminded that there are physicians on call 24 hours a day / 7 days a week (M- 5pm to 8am and from Friday 5pm until Monday 8a for the weekend) should the patient have questions or concerns. No future appointments. Other upcoming appointments:  n/a    Goals      Prevent complications post hospitalization. 1. CTN will monitor X 4 weeks    2. Ensure provider appt is scheduled within 7 days post-discharge 11/3/2020 Patient has appt with PCP 2020 at 10 am- virtually      3. Confirm patient attended post-discharge provider apt    4. Complete post-visit call to confirm attendance and update care needs  11/3/2020 Patient stated that she is doing ok today. She still has numbness on left side. She stated that it is getting better with being placed back on medications that she ran out of. No care needs at present time. 11/10/2020 Patient stated that she is still having the numbness and needs to be seen face to face. She feels like the virtual appointment is a waste of insurance money.  Placed patient name on [employer] to see if she could assist. 2020 Patient is doing better. No care needs at present. 5. Review/educate common or potential \"red flags\" of condition worsening 11/3/2020 Reviewed signs symptoms of TIA such as numbness, confusion, sight problems, headaches, walking problems, unable to speak, drooping on one side of face, unable to raise arm on one side. Just to name a few. 6. Evaluate adherence to medications and priority barriers to resolve  11/3/2020 Patient stated that she has all meds and is taking as directed. 11/10/2020 Patient stated that she does have meds and is taking per physician. 12/2/2020 Patient has all meds and is taking as directed. 7. Instruct on adherence to medications as ordered and assess for therapeutic response and side-effects   11/3/2020 Patient stated that she is aware of why she is taking each med and she verbalized understanding of side effects and when she needed to call the doctor. 11/10/2020 Patient stated that she has no concerns about meds at present. 12/2/2020 No medication concerns at present. 8. Discuss and evaluate ADL performance. Provide recommendations on energy conservation, particularly related to transition home from an inpatient admission. 11/3/2020 Patient stated that she is able to get around without any assistive devices. She stated that numbness is improving slightly due to getting back on meds. 11/10/2020 Patient stated that she continues to have numbness. She has returned to work. 12/2/2020 Patient is back to everyday activities. No complaints at present time.

## 2020-12-03 ENCOUNTER — PATIENT OUTREACH (OUTPATIENT)
Dept: CASE MANAGEMENT | Age: 50
End: 2020-12-03

## 2020-12-03 NOTE — PROGRESS NOTES
Patient has graduated from the Transitions of Care Coordination  program on 12/3/2020. Patient's symptoms are stable at this time. Patient/family has the ability to self-manage. Care management goals have been completed at this time. No further care transitions nurse follow up scheduled. Goals Addressed                 This Visit's Progress     COMPLETED: Prevent complications post hospitalization. 1. CTN will monitor X 4 weeks    2. Ensure provider appt is scheduled within 7 days post-discharge 11/3/2020 Patient has appt with PCP 11/11/2020 at 10 am- virtually      3. Confirm patient attended post-discharge provider apt    4. Complete post-visit call to confirm attendance and update care needs  11/3/2020 Patient stated that she is doing ok today. She still has numbness on left side. She stated that it is getting better with being placed back on medications that she ran out of. No care needs at present time. 11/10/2020 Patient stated that she is still having the numbness and needs to be seen face to face. She feels like the virtual appointment is a waste of insurance money. Placed patient name on [employer] list to see if she could assist. 12/2/2020 Patient is doing better. No care needs at present. 5. Review/educate common or potential \"red flags\" of condition worsening 11/3/2020 Reviewed signs symptoms of TIA such as numbness, confusion, sight problems, headaches, walking problems, unable to speak, drooping on one side of face, unable to raise arm on one side. Just to name a few. 6. Evaluate adherence to medications and priority barriers to resolve  11/3/2020 Patient stated that she has all meds and is taking as directed. 11/10/2020 Patient stated that she does have meds and is taking per physician. 12/2/2020 Patient has all meds and is taking as directed.       7. Instruct on adherence to medications as ordered and assess for therapeutic response and side-effects   11/3/2020 Patient stated that she is aware of why she is taking each med and she verbalized understanding of side effects and when she needed to call the doctor. 11/10/2020 Patient stated that she has no concerns about meds at present. 12/2/2020 No medication concerns at present. 8. Discuss and evaluate ADL performance. Provide recommendations on energy conservation, particularly related to transition home from an inpatient admission. 11/3/2020 Patient stated that she is able to get around without any assistive devices. She stated that numbness is improving slightly due to getting back on meds. 11/10/2020 Patient stated that she continues to have numbness. She has returned to work. 12/2/2020 Patient is back to everyday activities. No complaints at present time. 12/3/2020 Patient resumed all activities. No complaints. Patient has care transitions nurse contact information for any further questions, concerns, or needs. Patient's upcoming visits:  No future appointments.

## 2021-05-21 ENCOUNTER — OFFICE VISIT (OUTPATIENT)
Dept: FAMILY MEDICINE CLINIC | Age: 51
End: 2021-05-21
Payer: COMMERCIAL

## 2021-05-21 VITALS
SYSTOLIC BLOOD PRESSURE: 150 MMHG | TEMPERATURE: 98.1 F | RESPIRATION RATE: 14 BRPM | DIASTOLIC BLOOD PRESSURE: 90 MMHG | BODY MASS INDEX: 25.39 KG/M2 | HEART RATE: 74 BPM | OXYGEN SATURATION: 99 % | WEIGHT: 158 LBS | HEIGHT: 66 IN

## 2021-05-21 DIAGNOSIS — G62.9 NEUROPATHY: ICD-10-CM

## 2021-05-21 DIAGNOSIS — Z11.59 ENCOUNTER FOR HEPATITIS C SCREENING TEST FOR LOW RISK PATIENT: ICD-10-CM

## 2021-05-21 DIAGNOSIS — I10 ESSENTIAL HYPERTENSION: ICD-10-CM

## 2021-05-21 DIAGNOSIS — E11.9 TYPE 2 DIABETES MELLITUS WITHOUT COMPLICATION, WITH LONG-TERM CURRENT USE OF INSULIN (HCC): ICD-10-CM

## 2021-05-21 DIAGNOSIS — Z12.11 COLON CANCER SCREENING: ICD-10-CM

## 2021-05-21 DIAGNOSIS — E11.69 HYPERLIPIDEMIA ASSOCIATED WITH TYPE 2 DIABETES MELLITUS (HCC): ICD-10-CM

## 2021-05-21 DIAGNOSIS — E78.5 HYPERLIPIDEMIA ASSOCIATED WITH TYPE 2 DIABETES MELLITUS (HCC): ICD-10-CM

## 2021-05-21 DIAGNOSIS — Z12.31 ENCOUNTER FOR SCREENING MAMMOGRAM FOR MALIGNANT NEOPLASM OF BREAST: ICD-10-CM

## 2021-05-21 DIAGNOSIS — Z79.4 TYPE 2 DIABETES MELLITUS WITHOUT COMPLICATION, WITH LONG-TERM CURRENT USE OF INSULIN (HCC): ICD-10-CM

## 2021-05-21 DIAGNOSIS — J45.20 MILD INTERMITTENT ASTHMA WITHOUT COMPLICATION: Primary | ICD-10-CM

## 2021-05-21 LAB — HBA1C MFR BLD HPLC: 12.5 %

## 2021-05-21 PROCEDURE — 99214 OFFICE O/P EST MOD 30 MIN: CPT | Performed by: NURSE PRACTITIONER

## 2021-05-21 PROCEDURE — 83036 HEMOGLOBIN GLYCOSYLATED A1C: CPT | Performed by: NURSE PRACTITIONER

## 2021-05-21 RX ORDER — GABAPENTIN 300 MG/1
CAPSULE ORAL
Qty: 90 CAPSULE | Refills: 2 | Status: SHIPPED | OUTPATIENT
Start: 2021-05-21 | End: 2022-03-02 | Stop reason: SDUPTHER

## 2021-05-21 RX ORDER — INSULIN ASPART 100 [IU]/ML
INJECTION, SOLUTION INTRAVENOUS; SUBCUTANEOUS
Qty: 5 ADJUSTABLE DOSE PRE-FILLED PEN SYRINGE | Refills: 3 | Status: SHIPPED | OUTPATIENT
Start: 2021-05-21 | End: 2022-03-02 | Stop reason: SDUPTHER

## 2021-05-21 RX ORDER — ALBUTEROL SULFATE 90 UG/1
2 AEROSOL, METERED RESPIRATORY (INHALATION)
Qty: 1 INHALER | Refills: 0 | Status: SHIPPED | OUTPATIENT
Start: 2021-05-21 | End: 2022-02-10

## 2021-05-21 NOTE — PROGRESS NOTES
Geovanni Penalenin presents today for   Chief Complaint   Patient presents with   [facility] Pre-op Exam     eye surgery       Geovanni Thomas preferred language for health care discussion is english. Is someone accompanying this pt? no    Is the patient using any DME equipment during [address]? no    Depression Screening:  3 most recent PHQ Screens 5/21/2021   Little interest or pleasure in doing things Not at all   Feeling down, depressed, irritable, or hopeless Not at all   Total Score PHQ 2 0       Learning Assessment:  Learning Assessment 3/5/2015   PRIMARY LEARNER Patient   HIGHEST LEVEL OF EDUCATION - PRIMARY LEARNER  GRADUATED HIGH SCHOOL OR GED   BARRIERS PRIMARY LEARNER NONE   CO-LEARNER CAREGIVER -   PRIMARY LANGUAGE ENGLISH   LEARNER PREFERENCE PRIMARY READING   ANSWERED BY Patient   RELATIONSHIP SELF       Abuse Screening:  No flowsheet data found. Fall Risk  No flowsheet data found. Health Maintenance reviewed and discussed and ordered per Provider. Health Maintenance Due   Topic Date Due    Hepatitis C Screening  Never done    Pneumococcal 0-64 years (1 of 2 - PPSV23) Never done    Eye Exam Retinal or Dilated  Never done    COVID-19 Vaccine (1) Never done    Foot Exam Q1  11/21/2017    MICROALBUMIN Q1  11/21/2017    Shingrix Vaccine Age 50> (1 of 2) Never done    Colorectal Cancer Screening Combo  Never done    Breast Cancer Screen Mammogram  02/16/2018    PAP AKA CERVICAL CYTOLOGY  03/05/2018    A1C test (Diabetic or Prediabetic)  02/01/2021   . Geovannibebo Penalenin is updated on all     Pt currently taking Antiplatelet therapy? no    Coordination of Care:  1. Have you been to the ER, urgent care clinic since your last visit? no Hospitalized since your last visit? no    2. Have you seen or consulted any other health care providers outside of the [address] [place] [address] since your last visit? no Include any pap smears or colon screening.  no

## 2021-05-21 NOTE — PROGRESS NOTES
Marcia Deluna is a 46 y.o. female presenting today for Pre-op Exam (eye surgery) and Medication Refill (gabapentin, albuterol, humalog sliding scale)  . Chief Complaint   Patient presents with    Pre-op Exam     eye surgery    Medication Refill     gabapentin, albuterol, humalog sliding scale       HPI:  Marcia Deluna presents to the office today for operative examination patient is scheduled for cataract surgery with Dr. Lizeth Gonzalez. She carries a medical diagnosis for diabetes, hypertension, asthma, hyperlipidemia and neuropathy. She presents today requesting medication refills. She is also complaining of left arm, feet and ankle pain secondary to the neuropathy. Her pain is a level six on a scale of 0-10. Hypertension-patient BP is elevated today at 145/81 and 150/90. He is compliant with the medication treatment plan and notes that \"my blood pressure is elevated secondary to the pain\". Negative for chest pain or palpitation. Diabetes mellitus-patient was a previous patient of endocrinology. Her hemoglobin A1c is 12.5 today. She notes that she has not had her NovoLog insulin for several months. She denies any polyuria polydipsia.       Allergies   Allergen Reactions    Sumatriptan Nausea and Vomiting    Vicodin [Hydrocodone-Acetaminophen] Shortness of Breath       PHQ Screening   3 most recent PHQ Screens 2021   Little interest or pleasure in doing things Not at all   Feeling down, depressed, irritable, or hopeless Not at all   Total Score PHQ 2 0       History  Past Medical History:   Diagnosis Date    Asthma     Bronchitis     Cataract     Diabetes ([facility] [address]     Endocrine disease     hyperthyroidism    Endometriosis     Hypertension     Irregular bleeding     Migraine     Pelvic pain     Thyroid disease        Past Surgical History:   Procedure Laterality Date    HX  SECTION      x 3    HX COLONOSCOPY      HX GYN      partial hysterectomy    HX GYN      cervical laparoscopy; endometriosis    HX HYSTERECTOMY         Social History     Socioeconomic History    Marital status:      Spouse name: Not on file    Number of children: Not on file    Years of education: Not on file    Highest education level: Not on file   Occupational History    Not on file   Tobacco Use    Smoking status: Never Smoker    Smokeless tobacco: Never Used   Substance and Sexual Activity    Alcohol use: Yes     Alcohol/week: 0.0 standard drinks     Comment: occasional    Drug use: No    Sexual activity: Yes     Birth control/protection: None   Other Topics Concern    Not on file   Social History Narrative    Not on file     Social Determinants of Health     Financial Resource Strain:     Difficulty of Paying Living Expenses:    Food Insecurity:     Worried About Running Out of Food in the Last Year:     [address] in the Last Year:    Transportation Needs:     Lack of Transportation (Medical):  Lack of Transportation (Non-Medical):    Physical Activity:     Days of Exercise per Week:     Minutes of Exercise per Session:    Stress:     Feeling of Stress :    Social Connections:     Frequency of Communication with Friends and Family:     Frequency of Social Gatherings with Friends and Family:     Attends Shinto Services:     Active Member of Clubs or Organizations:     Attends Club or Organization Meetings:     Marital Status:    Intimate Partner Violence:     Fear of Current or Ex-Partner:     Emotionally Abused:     Physically Abused:     Sexually Abused:        Current Outpatient Medications   Medication Sig Dispense Refill    albuterol (PROVENTIL HFA, VENTOLIN HFA, PROAIR HFA) 90 mcg/actuation inhaler Take 2 Puffs by inhalation every four (4) hours as needed for Wheezing. 1 Inhaler 0    gabapentin (NEURONTIN) 300 mg capsule TAKE 1 CAPSULE BY MOUTH TID.  AX DAILY AMOUNT: 90M0 MG 90 Capsule 2    insulin aspart U-100 (NOVOLOG) 100 unit/mL (3 mL) inpn <150 BS/0 units, 151-200/2 units, 201-250/4 units, 251-300/6 units, 301-350/8 units, >351-400/10 units and call provider 5 Adjustable Dose Pre-filled Pen Syringe 3    atorvastatin (LIPITOR) 80 mg tablet Take 1 Tab by mouth nightly. Indications: excessive fat in the blood 30 Tab 0    aspirin (ASPIRIN) 325 mg tablet Take 1 Tab by mouth daily. 30 Tab 0    levothyroxine (SYNTHROID) 100 mcg tablet TAKE 1 TABLET BY MOUTH DAILY BEFORE BREAKFAST FOR HYPOTHYROIDISM 90 Tab 0    FreeStyle Sabrina 14 Day Sensor kit USE TO CHECK BLOOD SUGAR BEFORE BREAKFAST, LUNCH AND DINNER AND AT BEDTIME 5 Kit 2    Levemir U-100 Insulin 100 unit/mL injection ADMINISTER 40 UNITS UNDER THE SKIN TWICE DAILY 20 mL 11    metFORMIN (GLUCOPHAGE) 1,000 mg tablet Take 1 Tab by mouth two (2) times daily (with meals). Indications: type 2 diabetes mellitus 60 Tab 11    lisinopril (PRINIVIL, ZESTRIL) 20 mg tablet Take 1 Tab by mouth daily. 30 Tab 2    polyethylene glycol (MIRALAX) 17 gram/dose powder 1 tablespoon with 8 oz of water every 2 hours for 3 doses. 1 tablespoon with 8 oz of water twice daily until stools are soft and regular. 1 tablespoon with 8 oz of water once daily for maintenance. 600 g 0    Insulin Needles, Disposable, (PEN NEEDLE, DIABETIC) 31 gauge x 1/4\" ndle Use twice daily for insulin injections 90 Pen Needle 2    Blood-Glucose Meter monitoring kit Test twice daily 1 Kit 0    glucose blood VI test strips (BLOOD GLUCOSE TEST) strip Use twice daily as directed, to match her glucometer 100 Strip 11    cetirizine (ZYRTEC) 10 mg tablet Take 1 Tab by mouth daily. (Patient taking differently: Take 10 mg by mouth daily as needed.) 90 Tab 3    flash glucose scanning reader (FreeStyle Sabrina 14 Day [place]) misc 1 Each by Does Not Apply route Before breakfast, lunch, dinner and at bedtime. 1 Each 5    acetaminophen (TYLENOL) 325 mg tablet Take 2 Tabs by mouth every six (6) hours as needed for Pain.  (Patient not taking: Reported on 5/21/2021) 20 Tab 0 Vitals:    05/21/21 0811 05/21/21 0907   BP: (!) 145/81 (!) 150/90   Pulse: 74    Resp: 14    Temp: 98.1 °F (36.7 °C)    TempSrc: Oral    SpO2: 99%    Weight: 158 lb (71.7 kg)    Height: 5' 6\" (1.676 m)    PainSc:   6    PainLoc: Arm        Physical Exam  Vitals and nursing note reviewed. Constitutional:       Appearance: Normal appearance. Cardiovascular:      Rate and Rhythm: Normal rate and regular rhythm. Pulses: Normal pulses. Heart sounds: Normal heart sounds. No murmur heard. Pulmonary:      Effort: Pulmonary effort is normal.      Breath sounds: Normal breath sounds. Abdominal:      General: Bowel sounds are normal.      Palpations: Abdomen is soft. Skin:     General: Skin is warm and dry. Neurological:      Mental Status: She is alert. Office Visit on 05/21/2021   Component Date Value Ref Range Status    Hemoglobin A1c (POC) 05/21/2021 12.5  % Final       Results for orders placed or performed in visit on 05/21/21   AMB POC HEMOGLOBIN A1C   Result Value Ref Range    Hemoglobin A1c (POC) 12.5 %       Patient Care Team:  Patient Care Team:  Other, MD Migdalia as PCP - General  Art Kyle MD as Consulting Provider (Neurology)  Ricardo Amador MD (Neurology)      Assessment / Plan:      ICD-10-CM ICD-9-CM    1. Mild intermittent asthma without complication  Z45.09 889.29 albuterol (PROVENTIL HFA, VENTOLIN HFA, PROAIR HFA) 90 mcg/actuation inhaler   2. Type 2 diabetes mellitus without complication, with long-term current use of insulin (HCC)  E11.9 250.00 gabapentin (NEURONTIN) 300 mg capsule    Z79.4 V58.67 REFERRAL TO ENDOCRINOLOGY      AMB POC HEMOGLOBIN A1C      insulin aspart U-100 (NOVOLOG) 100 unit/mL (3 mL) inpn      MICROALBUMIN, UR, RAND W/ MICROALB/CREAT RATIO   3. Neuropathy  G62.9 355.9 gabapentin (NEURONTIN) 300 mg capsule   4. Encounter for screening mammogram for malignant neoplasm of breast  Z12.31 V76.12 THELMA MAMMO BI SCREENING INCL CAD   5.  Colon cancer screening  Z12.11 V76.51 REFERRAL TO GASTROENTEROLOGY   6. Essential hypertension  E29 467.6 METABOLIC PANEL, COMPREHENSIVE      CBC WITH AUTOMATED DIFF   7. Hyperlipidemia associated with type 2 diabetes mellitus (HCC)  E11.69 250.80 LIPID PANEL    E78.5 272.4    8. Encounter for hepatitis C screening test for low risk patient  Z11.59 V73.89 HEPATITIS C AB     Patient is cleared for cataract surgery  Follow-up and Dispositions    · Return in about 3 months (around 8/21/2021). Diabetes-gabapentin prescription refilled, referral to endocrinology and prescription for NovoLog given  Asthma-refilled albuterol inhaler  Hypertension-we will reevaluate in 8 to 10 weeks      I asked the patient if she  had any questions and answered her  questions. The patient stated that she understands the treatment plan and agrees with the treatment plan    This document was created with a voice activated dictation system and may contain transcription errors.

## 2021-08-02 DIAGNOSIS — I10 ESSENTIAL HYPERTENSION: ICD-10-CM

## 2021-08-02 RX ORDER — LISINOPRIL 20 MG/1
20 TABLET ORAL DAILY
Qty: 30 TABLET | Refills: 2 | Status: SHIPPED | OUTPATIENT
Start: 2021-08-02 | End: 2022-02-10

## 2021-08-02 NOTE — TELEPHONE ENCOUNTER
Requested Prescriptions     Pending Prescriptions Disp Refills    lisinopriL (PRINIVIL, ZESTRIL) 20 mg tablet 30 Tablet 2     Sig: Take 1 Tablet by mouth daily.

## 2021-11-17 DIAGNOSIS — E11.9 TYPE 2 DIABETES MELLITUS WITHOUT COMPLICATION, WITH LONG-TERM CURRENT USE OF INSULIN (HCC): ICD-10-CM

## 2021-11-17 DIAGNOSIS — Z79.4 TYPE 2 DIABETES MELLITUS WITHOUT COMPLICATION, WITH LONG-TERM CURRENT USE OF INSULIN (HCC): ICD-10-CM

## 2021-11-17 DIAGNOSIS — E03.9 HYPOTHYROIDISM, UNSPECIFIED TYPE: ICD-10-CM

## 2021-11-17 RX ORDER — FLASH GLUCOSE SENSOR
KIT MISCELLANEOUS
Qty: 5 KIT | Refills: 4 | Status: SHIPPED | OUTPATIENT
Start: 2021-11-17 | End: 2022-03-02 | Stop reason: SDUPTHER

## 2021-11-17 RX ORDER — LEVOTHYROXINE SODIUM 100 UG/1
TABLET ORAL
Qty: 90 TABLET | Refills: 1 | Status: SHIPPED | OUTPATIENT
Start: 2021-11-17 | End: 2022-03-02 | Stop reason: SDUPTHER

## 2021-12-01 ENCOUNTER — TELEPHONE (OUTPATIENT)
Dept: MAMMOGRAPHY | Age: 51
End: 2021-12-01

## 2021-12-01 NOTE — TELEPHONE ENCOUNTER
I contacted Donita Jimenez  This call was to assist  this patient  in scheduling a Mammogram . I left a message for this patient to return my call  at [phone].     Sidney Tejada LPN   Panel Manager  [email or web address]

## 2022-01-28 ENCOUNTER — TELEPHONE (OUTPATIENT)
Dept: FAMILY MEDICINE CLINIC | Age: 52
End: 2022-01-28

## 2022-02-02 NOTE — TELEPHONE ENCOUNTER
Patient has labs that was placed on 5/2021. Please verify with the lab that the orders are still active. Please notify the patient.

## 2022-02-04 ENCOUNTER — TELEPHONE (OUTPATIENT)
Dept: FAMILY MEDICINE CLINIC | Age: 52
End: 2022-02-04

## 2022-02-04 NOTE — TELEPHONE ENCOUNTER
----- Message from Nona Birch sent at 2/4/2022 11:58 AM EST -----  Subject: Message to Provider    QUESTIONS  Information for Provider? Pt called back returning a call regarding her   medication. please call her work number due to her not having access to   her mobile phone while at work. [phone]  ---------------------------------------------------------------------------  --------------  Barbara ARMAS  What is the best way for the office to contact you? OK to leave message on   voicemail  Preferred Call Back Phone Number?  [phone]  ---------------------------------------------------------------------------  --------------  SCRIPT ANSWERS  undefined

## 2022-02-08 DIAGNOSIS — I10 ESSENTIAL HYPERTENSION: ICD-10-CM

## 2022-02-10 ENCOUNTER — TELEPHONE (OUTPATIENT)
Dept: FAMILY MEDICINE CLINIC | Age: 52
End: 2022-02-10

## 2022-02-10 RX ORDER — LISINOPRIL 20 MG/1
20 TABLET ORAL DAILY
Qty: 30 TABLET | Refills: 2 | Status: SHIPPED | OUTPATIENT
Start: 2022-02-10 | End: 2022-03-08 | Stop reason: CLARIF

## 2022-02-10 NOTE — TELEPHONE ENCOUNTER
TC to patient, left VM that she needs to be evaluated in the office for her BP. Patient have missed several appointments. Provider stated she can take one pill in the morning and 1 pill in the afternoon until evaluated in the office on her schedule appointment.

## 2022-02-10 NOTE — TELEPHONE ENCOUNTER
Patient states her blood pressure 188/116 on Wednesday at oral surgeon. She also states they told her the blood pressure mediation she is on is too low. She has bee without her medication for a week.

## 2022-03-01 ENCOUNTER — APPOINTMENT (OUTPATIENT)
Dept: GENERAL RADIOLOGY | Age: 52
End: 2022-03-01
Attending: EMERGENCY MEDICINE
Payer: COMMERCIAL

## 2022-03-01 ENCOUNTER — HOSPITAL ENCOUNTER (EMERGENCY)
Age: 52
Discharge: HOME | End: 2022-03-01
Attending: EMERGENCY MEDICINE
Payer: COMMERCIAL

## 2022-03-01 VITALS
TEMPERATURE: 98.1 F | RESPIRATION RATE: 18 BRPM | BODY MASS INDEX: 28.32 KG/M2 | WEIGHT: 170 LBS | HEART RATE: 97 BPM | OXYGEN SATURATION: 99 % | DIASTOLIC BLOOD PRESSURE: 103 MMHG | SYSTOLIC BLOOD PRESSURE: 176 MMHG | HEIGHT: 65 IN

## 2022-03-01 DIAGNOSIS — M79.671 RIGHT FOOT PAIN: ICD-10-CM

## 2022-03-01 DIAGNOSIS — M25.571 ACUTE RIGHT ANKLE PAIN: Primary | ICD-10-CM

## 2022-03-01 PROCEDURE — 73610 X-RAY EXAM OF ANKLE: CPT

## 2022-03-01 PROCEDURE — 99283 EMERGENCY DEPT VISIT LOW MDM: CPT

## 2022-03-01 PROCEDURE — 73630 X-RAY EXAM OF FOOT: CPT

## 2022-03-01 PROCEDURE — 74011250637 HC RX REV CODE- 250/637: Performed by: EMERGENCY MEDICINE

## 2022-03-01 RX ORDER — NAPROXEN 250 MG/1
500 TABLET ORAL ONCE
Status: COMPLETED | OUTPATIENT
Start: 2022-03-01 | End: 2022-03-01

## 2022-03-01 RX ORDER — NAPROXEN 500 MG/1
500 TABLET ORAL 2 TIMES DAILY WITH MEALS
Qty: 6 TABLET | Refills: 0 | Status: SHIPPED | OUTPATIENT
Start: 2022-03-01 | End: 2022-03-04

## 2022-03-01 RX ADMIN — NAPROXEN 500 MG: 250 TABLET ORAL at 08:43

## 2022-03-01 NOTE — ED TRIAGE NOTES
Pt reports tripping over [employer] at work yesterday and was stuck under elaine. Pt reports catching herself and did not actual fall. She reports moving boxes on elaine. She has c/o right foot pain that \"hurts underneath and my ankle hurts on the outside\". Pt reports mild swelling and foot is noted to be tender to touch.

## 2022-03-01 NOTE — ED PROVIDER NOTES
EMERGENCY DEPARTMENT HISTORY AND PHYSICAL EXAM    8:40 AM  Date: 3/1/2022  Patient Name: Josué Adams    History of Presenting Illness       History Provided By:     HPI: Josué Adams is a 45 y.o. female with medical history as below presents with right foot and ankle pain after it got up under a elaine and twisted it, states that foot went outwards. Pain is moderate severity, constant, worse with movement, no modifying factors. Patient states that right foot is more swollen than yesterday. PCP: Julio Sue NP    Past History     Past Medical History:  Past Medical History:   Diagnosis Date    Asthma     Bronchitis     Cataract     Diabetes ([facility] [address]     Endocrine disease     hyperthyroidism    Endometriosis     Hypertension     Irregular bleeding     Migraine     Pelvic pain     Thyroid disease        Past Surgical History:  Past Surgical History:   Procedure Laterality Date    HX  SECTION      x 3    HX COLONOSCOPY      HX GYN      partial hysterectomy    HX GYN      cervical laparoscopy; endometriosis    HX HYSTERECTOMY         Family History:  Family History   Problem Relation Age of Onset    Cancer Mother     Diabetes Mother     Dementia Mother     Diabetes Father        Social History:  Social History     Tobacco Use    Smoking status: Never Smoker    Smokeless tobacco: Never Used   Vaping Use    Vaping Use: Never used   Substance Use Topics    Alcohol use: Yes     Alcohol/week: 0.0 standard drinks     Comment: occasional    Drug use: No       Allergies: Allergies   Allergen Reactions    Sumatriptan Nausea and Vomiting    Vicodin [Hydrocodone-Acetaminophen] Shortness of Breath       Review of Systems   Review of Systems   Constitutional: Negative for activity change, appetite change and chills. HENT: Negative for congestion, ear discharge, ear pain and sore throat. Eyes: Negative for photophobia and pain. Respiratory: Negative for cough and choking. Cardiovascular: Negative for palpitations and leg swelling. Gastrointestinal: Negative for anal bleeding and rectal pain. Endocrine: Negative for polydipsia and polyuria. Genitourinary: Negative for genital sores and urgency. Musculoskeletal: Negative for arthralgias and myalgias. Neurological: Negative for dizziness, seizures and speech difficulty. Psychiatric/Behavioral: Negative for hallucinations, self-injury and suicidal ideas. Physical Exam     Patient Vitals for the past 12 hrs:   Temp Pulse Resp BP SpO2   03/01/22 0923    (!) 176/103    03/01/22 0835 98.1 °F (36.7 °C) 97 18 (!) 185/124 99 %       Physical Exam  Vitals and nursing note reviewed. Constitutional:       Appearance: She is well-developed. HENT:      Head: Normocephalic and atraumatic. Eyes:      General:         Right eye: No discharge. Left eye: No discharge. Cardiovascular:      Rate and Rhythm: Normal rate and regular rhythm. Heart sounds: Normal heart sounds. No murmur heard. Pulmonary:      Effort: Pulmonary effort is normal. No respiratory distress. Breath sounds: Normal breath sounds. No stridor. No wheezing or rales. Chest:      Chest wall: No tenderness. Abdominal:      General: Bowel sounds are normal. There is no distension. Palpations: Abdomen is soft. Tenderness: There is no abdominal tenderness. There is no guarding or rebound. Musculoskeletal:         General: Normal range of motion. Cervical back: Normal range of motion and neck supple. Comments: R foot no significant swelling or deformity  R lateral malleolus tenderness  No skin changes   Skin:     General: Skin is warm and dry. Neurological:      Mental Status: She is alert and oriented to person, place, and time. Diagnostic Study Results     Labs -  No results found for this or any previous visit (from the past 12 hour(s)).     Radiologic Studies -   XR ANKLE RT MIN 3 V    Result Date: 3/1/2022  No radiographic finding for an acute osseous process of the right ankle or foot. There is mild dorsal soft tissue swelling of the forefoot. Small posterior plantar calcaneal heel spurs. No significant change. XR FOOT RT MIN 3 V    Result Date: 3/1/2022  No radiographic finding for an acute osseous process of the right ankle or foot. There is mild dorsal soft tissue swelling of the forefoot. Small posterior plantar calcaneal heel spurs. No significant change. Medical Decision Making     ED Course: Progress Notes, Reevaluation, and Consults:    8:40 AM Initial assessment performed. The patients presenting problems have been discussed, and they/their family are in agreement with the care plan formulated and outlined with them. I have encouraged them to ask questions as they arise throughout their visit. Provider Notes (Medical Decision Making):   Patient presents with R ankle pain  R lateral malleolus tenderness  Neurovascularly intact  No acute fracture right ankle and foot  Clinical impression ankle sprain  Advised given for RICE  Patient will be discharged with orthopedic follow-up if symptoms persist  Given advice to f/u with PMD for BP control          Vital Signs-Reviewed the patient's vital signs. Reviewed pt's pulse ox reading. Records Reviewed: old medical records  -I am the first provider for this patient.  -I reviewed the vital signs, available nursing notes, past medical history, past surgical history, family history and social history. Current Outpatient Medications   Medication Sig Dispense Refill    naproxen (NAPROSYN) 500 mg tablet Take 1 Tablet by mouth two (2) times daily (with meals) for 3 days.  6 Tablet 0    lisinopriL (PRINIVIL, ZESTRIL) 20 mg tablet TAKE 1 TABLET BY MOUTH DAILY 30 Tablet 2    albuterol (PROVENTIL HFA, VENTOLIN HFA, PROAIR HFA) 90 mcg/actuation inhaler INHALE 2 PUFFS BY MOUTH EVERY 4 HOURS AS NEEDED FOR WHEEZING 8.5 g 1    FreeStyle Sabrina 14 Day Sensor kit USE TO CHECK BLOOD SUGAR BEFORE BREAKFAST, LUNCH AND DINNER AND AT BEDTIME 5 Kit 4    levothyroxine (SYNTHROID) 100 mcg tablet TAKE 1 TABLET BY MOUTH DAILY BEFORE BREAKFAST FOR HYPOTHYROIDISM 90 Tablet 1    gabapentin (NEURONTIN) 300 mg capsule TAKE 1 CAPSULE BY MOUTH TID. AX DAILY AMOUNT: 90M0 MG 90 Capsule 2    insulin aspart U-100 (NOVOLOG) 100 unit/mL (3 mL) inpn <150 BS/0 units, 151-200/2 units, 201-250/4 units, 251-300/6 units, 301-350/8 units, >351-400/10 units and call provider 5 Adjustable Dose Pre-filled Pen Syringe 3    atorvastatin (LIPITOR) 80 mg tablet Take 1 Tab by mouth nightly. Indications: excessive fat in the blood 30 Tab 0    aspirin (ASPIRIN) 325 mg tablet Take 1 Tab by mouth daily. (Patient not taking: Reported on 11/23/2021) 30 Tab 0    Levemir U-100 Insulin 100 unit/mL injection ADMINISTER 40 UNITS UNDER THE SKIN TWICE DAILY 20 mL 11    flash glucose scanning reader ([employer] Sabrina 14 Day [place]) misc 1 Each by Does Not Apply route Before breakfast, lunch, dinner and at bedtime. 1 Each 5    metFORMIN (GLUCOPHAGE) 1,000 mg tablet Take 1 Tab by mouth two (2) times daily (with meals). Indications: type 2 diabetes mellitus 60 Tab 11    acetaminophen (TYLENOL) 325 mg tablet Take 2 Tabs by mouth every six (6) hours as needed for Pain. (Patient not taking: Reported on 5/21/2021) 20 Tab 0    polyethylene glycol (MIRALAX) 17 gram/dose powder 1 tablespoon with 8 oz of water every 2 hours for 3 doses. 1 tablespoon with 8 oz of water twice daily until stools are soft and regular. 1 tablespoon with 8 oz of water once daily for maintenance.  (Patient not taking: Reported on 11/23/2021) 600 g 0    Insulin Needles, Disposable, (PEN NEEDLE, DIABETIC) 31 gauge x 1/4\" ndle Use twice daily for insulin injections 90 Pen Needle 2    Blood-Glucose Meter monitoring kit Test twice daily 1 Kit 0    glucose blood VI test strips (BLOOD GLUCOSE TEST) strip Use twice daily as directed, to match her glucometer 100 Strip 11    cetirizine (ZYRTEC) 10 mg tablet Take 1 Tab by mouth daily. (Patient taking differently: Take 10 mg by mouth daily as needed.) 90 Tab 3        Clinical Impression     Clinical Impression:   1. Acute right ankle pain    2. Right foot pain        Disposition: This note was dictated utilizing voice recognition software which may lead to typographical errors. I apologize in advance if the situation occurs. If questions arise please do not hesitate to contact me or call our department.     Toby Williamson MD  8:40 AM

## 2022-03-01 NOTE — Clinical Note
[address] [facility] EMERGENCY DEPT  [address] [id number] [phone]    Work/School Note    Date: 3/1/2022    To Whom It May concern:    Tom Rai was seen and treated today in the emergency room by the following provider(s):  Attending Provider: Tereza Pradhan MD.      Tom Rai is excused from work/school on 03/01/22 and 03/02/22. She is medically clear to return to work/school on 3/3/2022.        Sincerely,          Jose Miguel Tate MD

## 2022-03-01 NOTE — Clinical Note
[address] [facility] EMERGENCY DEPT  [address] [id number] [phone]    Work/School Note    Date: 3/1/2022    To Whom It May concern:    Harsh Pacheco was seen and treated today in the emergency room by the following provider(s):  Attending Provider: Scott Brooks MD.      Harsh Pacheco is excused from work/school on 03/01/22 and 03/02/22. She is medically clear to return to work/school on 3/3/2022.        Sincerely,          Vincent Best MD

## 2022-03-02 ENCOUNTER — OFFICE VISIT (OUTPATIENT)
Dept: FAMILY MEDICINE CLINIC | Age: 52
End: 2022-03-02
Payer: COMMERCIAL

## 2022-03-02 VITALS
HEART RATE: 88 BPM | DIASTOLIC BLOOD PRESSURE: 100 MMHG | RESPIRATION RATE: 16 BRPM | WEIGHT: 180 LBS | HEIGHT: 65 IN | TEMPERATURE: 97 F | OXYGEN SATURATION: 100 % | BODY MASS INDEX: 29.99 KG/M2 | SYSTOLIC BLOOD PRESSURE: 152 MMHG

## 2022-03-02 DIAGNOSIS — E03.9 HYPOTHYROIDISM, UNSPECIFIED TYPE: ICD-10-CM

## 2022-03-02 DIAGNOSIS — S99.911D INJURY OF RIGHT ANKLE, SUBSEQUENT ENCOUNTER: ICD-10-CM

## 2022-03-02 DIAGNOSIS — Z79.4 TYPE 2 DIABETES MELLITUS WITHOUT COMPLICATION, WITH LONG-TERM CURRENT USE OF INSULIN (HCC): Primary | ICD-10-CM

## 2022-03-02 DIAGNOSIS — E11.9 CONTROLLED TYPE 2 DIABETES MELLITUS WITHOUT COMPLICATION, WITH LONG-TERM CURRENT USE OF INSULIN (HCC): ICD-10-CM

## 2022-03-02 DIAGNOSIS — E11.40 TYPE 2 DIABETES, CONTROLLED, WITH NEUROPATHY (HCC): ICD-10-CM

## 2022-03-02 DIAGNOSIS — G62.9 NEUROPATHY: ICD-10-CM

## 2022-03-02 DIAGNOSIS — J45.20 MILD INTERMITTENT ASTHMA WITHOUT COMPLICATION: ICD-10-CM

## 2022-03-02 DIAGNOSIS — Z79.4 CONTROLLED TYPE 2 DIABETES MELLITUS WITHOUT COMPLICATION, WITH LONG-TERM CURRENT USE OF INSULIN (HCC): ICD-10-CM

## 2022-03-02 DIAGNOSIS — E11.9 TYPE 2 DIABETES MELLITUS WITHOUT COMPLICATION, WITH LONG-TERM CURRENT USE OF INSULIN (HCC): Primary | ICD-10-CM

## 2022-03-02 DIAGNOSIS — I10 ESSENTIAL HYPERTENSION: ICD-10-CM

## 2022-03-02 LAB — HBA1C MFR BLD HPLC: 10.4 %

## 2022-03-02 PROCEDURE — 3046F HEMOGLOBIN A1C LEVEL >9.0%: CPT | Performed by: NURSE PRACTITIONER

## 2022-03-02 PROCEDURE — 99214 OFFICE O/P EST MOD 30 MIN: CPT | Performed by: NURSE PRACTITIONER

## 2022-03-02 PROCEDURE — 83036 HEMOGLOBIN GLYCOSYLATED A1C: CPT | Performed by: NURSE PRACTITIONER

## 2022-03-02 RX ORDER — LISINOPRIL 20 MG/1
20 TABLET ORAL DAILY
Qty: 30 TABLET | Refills: 2 | Status: CANCELLED | OUTPATIENT
Start: 2022-03-02

## 2022-03-02 NOTE — PROGRESS NOTES
Florencio Ibrahim presents today for   Chief Complaint   Patient presents with    Hypertension     follow-up    Diabetes    ED Follow-up     sprained ankle       Is someone accompanying this pt? no    Is the patient using any DME equipment during OV? no    Depression Screening:  3 most recent PHQ Screens 3/2/2022   Little interest or pleasure in doing things Not at all   Feeling down, depressed, irritable, or hopeless Not at all   Total Score PHQ 2 0   Trouble falling or staying asleep, or sleeping too much Not at all   Feeling tired or having little energy Not at all   Poor appetite, weight loss, or overeating Not at all   Feeling bad about yourself - or that you are a failure or have let yourself or your family down Not at all   Trouble concentrating on things such as school, work, reading, or watching TV Not at all   Moving or speaking so slowly that other people could have noticed; or the opposite being so fidgety that others notice Not at all   Thoughts of being better off dead, or hurting yourself in some way Not at all   PHQ 9 Score 0   How difficult have these problems made it for you to do your work, take care of your home and get along with others Not difficult at all       Learning Assessment:  Learning Assessment 3/5/2015   PRIMARY LEARNER Patient   HIGHEST LEVEL OF EDUCATION - PRIMARY LEARNER  GRADUATED HIGH SCHOOL OR GED   BARRIERS PRIMARY LEARNER NONE   CO-LEARNER CAREGIVER -   PRIMARY LANGUAGE ENGLISH   LEARNER PREFERENCE PRIMARY READING   ANSWERED BY Patient   RELATIONSHIP SELF       Health Maintenance reviewed and discussed and ordered per Provider.     Health Maintenance Due   Topic Date Due    Hepatitis C Screening  Never done    Pneumococcal 0-64 years (1 of 2 - PPSV23) Never done    Eye Exam Retinal or Dilated  Never done    Colorectal Cancer Screening Combo  Never done    Foot Exam Q1  11/21/2017    MICROALBUMIN Q1  11/21/2017    Shingrix Vaccine Age 50> (1 of 2) Never done    Breast Cancer Screen Mammogram  02/16/2018    A1C test (Diabetic or Prediabetic)  08/21/2021    Lipid Screen  01/26/2022   . Coordination of Care:  1. Have you been to the ER, urgent care clinic since your last visit? Hospitalized since your last visit? no    2. Have you seen or consulted any other health care providers outside of the [address] [place] [address] since your last visit? Include any pap smears or colon screening.  no

## 2022-03-02 NOTE — PROGRESS NOTES
Ronal Chacko is a 45 y.o. female presenting today for Hypertension (follow-up), Diabetes, and ED Follow-up (sprained ankle)  . Chief Complaint   Patient presents with    Hypertension     follow-up    Diabetes    ED Follow-up     sprained ankle       HPI:  Ronal Chacko presents to the office today for routine follow-up care. She carries a medical diagnosis for diabetes, hypertension, asthma, hyperlipidemia and neuropathy. She presents today requesting medication refills. She is also complaining of left arm, feet and ankle pain secondary to the neuropathy. Her pain is a level ten on a scale of 0-10. Hypertension-patient BP is elevated today (188/106 [id number]/100). She is compliant with the medication treatment plan and notes that \"my blood pressure is elevated secondary to the pain\". Negative for chest pain or palpitation. Diabetes mellitus-patient was a previous patient of endocrinology. Her HBA1C decreased from 12.5% to 10.6%. She denies any polyuria or polydipsia.       Allergies   Allergen Reactions    Sumatriptan Nausea and Vomiting    Vicodin [Hydrocodone-Acetaminophen] Shortness of Breath       PHQ Screening   3 most recent PHQ Screens 3/2/2022   Little interest or pleasure in doing things Not at all   Feeling down, depressed, irritable, or hopeless Not at all   Total Score PHQ 2 0   Trouble falling or staying asleep, or sleeping too much Not at all   Feeling tired or having little energy Not at all   Poor appetite, weight loss, or overeating Not at all   Feeling bad about yourself - or that you are a failure or have let yourself or your family down Not at all   Trouble concentrating on things such as school, work, reading, or watching TV Not at all   Moving or speaking so slowly that other people could have noticed; or the opposite being so fidgety that others notice Not at all   Thoughts of being better off dead, or hurting yourself in some way Not at all   PHQ 9 Score 0   How difficult have these problems made it for you to do your work, take care of your home and get along with others Not difficult at all       History  Past Medical History:   Diagnosis Date    Asthma     Bronchitis     Cataract     Diabetes ([facility] [address]     Endocrine disease     hyperthyroidism    Endometriosis     Hypertension     Irregular bleeding     Migraine     Pelvic pain     Thyroid disease        Past Surgical History:   Procedure Laterality Date    HX  SECTION      x 3    HX COLONOSCOPY      HX GYN      partial hysterectomy    HX GYN      cervical laparoscopy; endometriosis    HX HYSTERECTOMY         Social History     Socioeconomic History    Marital status:      Spouse name: Not on file    Number of children: Not on file    Years of education: Not on file    Highest education level: Not on file   Occupational History    Not on file   Tobacco Use    Smoking status: Never Smoker    Smokeless tobacco: Never Used   Vaping Use    Vaping Use: Never used   Substance and Sexual Activity    Alcohol use: Yes     Alcohol/week: 0.0 standard drinks     Comment: occasional    Drug use: No    Sexual activity: Yes     Birth control/protection: None   Other Topics Concern    Not on file   Social History Narrative    Not on file     Social Determinants of Health     Financial Resource Strain:     Difficulty of Paying Living Expenses: Not on file   Food Insecurity:     Worried About Running Out of Food in the Last Year: Not on file    Justin of Food in the Last Year: Not on file   Transportation Needs:     Lack of Transportation (Medical): Not on file    Lack of Transportation (Non-Medical):  Not on file   Physical Activity:     Days of Exercise per Week: Not on file    Minutes of Exercise per Session: Not on file   Stress:     Feeling of Stress : Not on file   Social Connections:     Frequency of Communication with Friends and Family: Not on file    Frequency of Social Gatherings with Friends and Family: Not on file    Attends Confucianist Services: Not on file    Active Member of Clubs or Organizations: Not on file    Attends Club or Organization Meetings: Not on file    Marital Status: Not on file   Intimate Partner Violence:     Fear of Current or Ex-Partner: Not on file    Emotionally Abused: Not on file    Physically Abused: Not on file    Sexually Abused: Not on file   Housing Stability:     Unable to Pay for Housing in the Last Year: Not on file    Number of [place] in the Last Year: Not on file    Unstable Housing in the Last Year: Not on file       Current Outpatient Medications   Medication Sig Dispense Refill    albuterol (PROVENTIL HFA, VENTOLIN HFA, PROAIR HFA) 90 mcg/actuation inhaler Take 2 Puffs by inhalation every four (4) hours as needed for Wheezing. 8.5 g 1    atorvastatin (LIPITOR) 80 mg tablet Take 1 Tablet by mouth nightly. Indications: excessive fat in the blood 30 Tablet 0    metFORMIN (GLUCOPHAGE) 1,000 mg tablet Take 1 Tablet by mouth two (2) times daily (with meals). Indications: type 2 diabetes mellitus 60 Tablet 11    levothyroxine (SYNTHROID) 100 mcg tablet TAKE 1 TABLET BY MOUTH DAILY BEFORE BREAKFAST FOR HYPOTHYROIDISM 90 Tablet 1    insulin detemir U-100 (Levemir U-100 Insulin) 100 unit/mL injection ADMINISTER 40 UNITS UNDER THE SKIN TWICE DAILY 20 mL 4    flash glucose sensor (FreeStyle Sabrina 14 Day Sensor) kit USE TO CHECK BLOOD SUGAR BEFORE BREAKFAST, LUNCH AND DINNER AND AT BEDTIME 5 Kit 4    gabapentin (NEURONTIN) 300 mg capsule TAKE 1 CAPSULE BY MOUTH TID.  AX DAILY AMOUNT: 90M0 MG 90 Capsule 2    glucose blood VI test strips (blood glucose test) strip Use twice daily as directed, to match her glucometer 100 Strip 11    insulin aspart U-100 (NOVOLOG) 100 unit/mL (3 mL) inpn <150 BS/0 units, 151-200/2 units, 201-250/4 units, 251-300/6 units, 301-350/8 units, >351-400/10 units and call provider 5 Adjustable Dose Pre-filled Pen Syringe 3    Insulin Needles, Disposable, (pen needle, diabetic) 31 gauge x 1/4\" ndle Use twice daily for insulin injections 90 Pen Needle 2    flash glucose scanning reader (FreeStyle Sabrina 14 Day [place]) misc 1 Each by Does Not Apply route Before breakfast, lunch, dinner and at bedtime. 1 Each 5    lisinopril-hydroCHLOROthiazide (PRINZIDE, ZESTORETIC) 20-12.5 mg per tablet Take 1 Tablet by mouth two (2) times a day. 60 Tablet 2    amLODIPine (NORVASC) 2.5 mg tablet Take 1 Tablet by mouth daily. 30 Tablet 1    Blood-Glucose Meter monitoring kit Test twice daily 1 Kit 0    cetirizine (ZYRTEC) 10 mg tablet Take 1 Tab by mouth daily. (Patient taking differently: Take 10 mg by mouth daily as needed.) 90 Tab 3    aspirin (ASPIRIN) 325 mg tablet Take 1 Tab by mouth daily. (Patient not taking: Reported on 11/23/2021) 30 Tab 0    acetaminophen (TYLENOL) 325 mg tablet Take 2 Tabs by mouth every six (6) hours as needed for Pain. (Patient not taking: Reported on 5/21/2021) 20 Tab 0    polyethylene glycol (MIRALAX) 17 gram/dose powder 1 tablespoon with 8 oz of water every 2 hours for 3 doses. 1 tablespoon with 8 oz of water twice daily until stools are soft and regular. 1 tablespoon with 8 oz of water once daily for maintenance. (Patient not taking: Reported on 11/23/2021) 600 g 0         Vitals:    03/02/22 1134 03/02/22 1214   BP: (!) 188/106 (!) 152/100   Pulse: 88    Resp: 16    Temp: 97 °F (36.1 °C)    TempSrc: Oral    SpO2: 100%    Weight: 180 lb (81.6 kg)    Height: 5' 5\" (1.651 m)    PainSc:  10 - Worst pain ever        Physical Exam  Vitals and nursing note reviewed. Constitutional:       Appearance: Normal appearance. Cardiovascular:      Rate and Rhythm: Normal rate and regular rhythm. Pulses: Normal pulses. Heart sounds: Normal heart sounds. No murmur heard. Pulmonary:      Effort: Pulmonary effort is normal.      Breath sounds: Normal breath sounds.    Abdominal:      General: Bowel sounds are normal.      Palpations: Abdomen is soft. Skin:     General: Skin is warm and dry. Neurological:      Mental Status: She is alert. Office Visit on 03/02/2022   Component Date Value Ref Range Status    Hemoglobin A1c (POC) 03/02/2022 10.4  % Final       Results for orders placed or performed in visit on 03/02/22   AMB POC HEMOGLOBIN A1C   Result Value Ref Range    Hemoglobin A1c (POC) 10.4 %       Patient Care Team:  Patient Care Team:  Brenda Jones NP as PCP - General (Nurse Practitioner)  Brenda Jones NP as PCP - [facility] Empaneled Provider  Digna Wells MD as Consulting Provider (Neurology)  Yifan Suresh MD (Neurology)      Assessment / Plan:      ICD-10-CM ICD-9-CM    1. Type 2 diabetes mellitus without complication, with long-term current use of insulin (HCC)  E11.9 250.00 AMB POC HEMOGLOBIN A1C    Z79.4 V58.67    2. Mild intermittent asthma without complication  P78.80 096.97 albuterol (PROVENTIL HFA, VENTOLIN HFA, PROAIR HFA) 90 mcg/actuation inhaler   3. Essential hypertension  I10 401.9 lisinopril-hydroCHLOROthiazide (PRINZIDE, ZESTORETIC) 20-12.5 mg per tablet      amLODIPine (NORVASC) 2.5 mg tablet   4. Hypothyroidism, unspecified type  E03.9 244.9 levothyroxine (SYNTHROID) 100 mcg tablet   5. Neuropathy  G62.9 355.9 gabapentin (NEURONTIN) 300 mg capsule   6. Type 2 diabetes, controlled, with neuropathy (HCC)  E11.40 250.60 atorvastatin (LIPITOR) 80 mg tablet     357.2 metFORMIN (GLUCOPHAGE) 1,000 mg tablet      insulin detemir U-100 (Levemir U-100 Insulin) 100 unit/mL injection      flash glucose sensor (FreeStyle Sabrina 14 Day Sensor) kit      gabapentin (NEURONTIN) 300 mg capsule      glucose blood VI test strips (blood glucose test) strip      insulin aspart U-100 (NOVOLOG) 100 unit/mL (3 mL) inpn      flash glucose scanning reader (FreeStyle Sabrina 14 Day [place]) misc   7.  Controlled type 2 diabetes mellitus without complication, with long-term current use of insulin ([facility])  E11.9 250.00 Insulin Needles, Disposable, (pen needle, diabetic) 31 gauge x 1/4\" ndle    Z79.4 V58.67    8. Injury of right ankle, subsequent encounter  S99.911D V58.89 REFERRAL TO ORTHOPEDICS     959.7      Follow-up and Dispositions    · Return in about 8 weeks (around 4/27/2022) for diabetes, hypertension. Diabetes-medications adjusted  Asthma-refilled albuterol inhaler  Hypertension-we will reevaluate in 8 to 10 weeks  Referral to Ortho  Medication refills    I asked the patient if she  had any questions and answered her  questions. The patient stated that she understands the treatment plan and agrees with the treatment plan    This document was created with a voice activated dictation system and may contain transcription errors.

## 2022-03-03 ENCOUNTER — TRANSCRIBE ORDER (OUTPATIENT)
Dept: SCHEDULING | Age: 52
End: 2022-03-03

## 2022-03-03 DIAGNOSIS — S43.402D SPRAIN OF LEFT SHOULDER, SUBSEQUENT ENCOUNTER: Primary | ICD-10-CM

## 2022-03-08 RX ORDER — LISINOPRIL AND HYDROCHLOROTHIAZIDE 12.5; 2 MG/1; MG/1
1 TABLET ORAL 2 TIMES DAILY
Qty: 60 TABLET | Refills: 2 | Status: SHIPPED | OUTPATIENT
Start: 2022-03-08 | End: 2022-06-14 | Stop reason: SDUPTHER

## 2022-03-08 RX ORDER — FLASH GLUCOSE SENSOR
KIT MISCELLANEOUS
Qty: 5 KIT | Refills: 4 | Status: SHIPPED | OUTPATIENT
Start: 2022-03-08

## 2022-03-08 RX ORDER — AMLODIPINE BESYLATE 2.5 MG/1
2.5 TABLET ORAL DAILY
Qty: 30 TABLET | Refills: 1 | Status: SHIPPED | OUTPATIENT
Start: 2022-03-08 | End: 2022-05-16

## 2022-03-08 RX ORDER — ALBUTEROL SULFATE 90 UG/1
2 AEROSOL, METERED RESPIRATORY (INHALATION)
Qty: 8.5 G | Refills: 1 | Status: SHIPPED | OUTPATIENT
Start: 2022-03-08

## 2022-03-08 RX ORDER — PEN NEEDLE, DIABETIC 29 G X1/2"
NEEDLE, DISPOSABLE MISCELLANEOUS
Qty: 90 PEN NEEDLE | Refills: 2 | Status: SHIPPED | OUTPATIENT
Start: 2022-03-08

## 2022-03-08 RX ORDER — METFORMIN HYDROCHLORIDE 1000 MG/1
1000 TABLET ORAL 2 TIMES DAILY WITH MEALS
Qty: 60 TABLET | Refills: 11 | Status: SHIPPED | OUTPATIENT
Start: 2022-03-08

## 2022-03-08 RX ORDER — LEVOTHYROXINE SODIUM 100 UG/1
TABLET ORAL
Qty: 90 TABLET | Refills: 1 | Status: SHIPPED | OUTPATIENT
Start: 2022-03-08

## 2022-03-08 RX ORDER — ATORVASTATIN CALCIUM 80 MG/1
80 TABLET, FILM COATED ORAL
Qty: 30 TABLET | Refills: 0 | Status: SHIPPED | OUTPATIENT
Start: 2022-03-08

## 2022-03-08 RX ORDER — FLASH GLUCOSE SCANNING READER
1 EACH MISCELLANEOUS
Qty: 1 EACH | Refills: 5 | Status: SHIPPED | OUTPATIENT
Start: 2022-03-08

## 2022-03-08 RX ORDER — INSULIN ASPART 100 [IU]/ML
INJECTION, SOLUTION INTRAVENOUS; SUBCUTANEOUS
Qty: 5 ADJUSTABLE DOSE PRE-FILLED PEN SYRINGE | Refills: 3 | Status: SHIPPED | OUTPATIENT
Start: 2022-03-08

## 2022-03-08 RX ORDER — IBUPROFEN 200 MG
CAPSULE ORAL
Qty: 100 STRIP | Refills: 11 | Status: SHIPPED | OUTPATIENT
Start: 2022-03-08

## 2022-03-08 RX ORDER — GABAPENTIN 300 MG/1
CAPSULE ORAL
Qty: 90 CAPSULE | Refills: 2 | Status: SHIPPED | OUTPATIENT
Start: 2022-03-08

## 2022-03-19 PROBLEM — R20.0 LEFT SIDED NUMBNESS: Status: ACTIVE | Noted: 2020-11-02

## 2022-03-19 PROBLEM — J45.20 MILD INTERMITTENT ASTHMA WITHOUT COMPLICATION: Status: ACTIVE | Noted: 2018-01-15

## 2022-03-20 PROBLEM — E78.5 HYPERLIPIDEMIA ASSOCIATED WITH TYPE 2 DIABETES MELLITUS (HCC): Status: ACTIVE | Noted: 2018-01-15

## 2022-03-20 PROBLEM — E11.69 HYPERLIPIDEMIA ASSOCIATED WITH TYPE 2 DIABETES MELLITUS (HCC): Status: ACTIVE | Noted: 2018-01-15

## 2022-03-21 ENCOUNTER — TRANSCRIBE ORDER (OUTPATIENT)
Dept: SCHEDULING | Age: 52
End: 2022-03-21

## 2022-03-27 ENCOUNTER — HOSPITAL ENCOUNTER (OUTPATIENT)
Age: 52
Discharge: HOME | End: 2022-03-27
Attending: ORTHOPAEDIC SURGERY
Payer: COMMERCIAL

## 2022-03-27 DIAGNOSIS — S43.402D SPRAIN OF LEFT SHOULDER, SUBSEQUENT ENCOUNTER: ICD-10-CM

## 2022-03-27 PROCEDURE — 73221 MRI JOINT UPR EXTREM W/O DYE: CPT

## 2022-04-19 NOTE — PROGRESS NOTES
AMBULATORY PROGRESS NOTE      Patient: Gomez Alexander             MRN: [id number]     SSN: [id number] Body mass index is 29.34 kg/m². YOB: 1970     AGE: 45 y.o. EX: female    PCP: Vasyl Monreal NP       IMPRESSION //  DIAGNOSIS AND TREATMENT PLAN        Gomez Alexander has a diagnosis of:      She has point tenderness to the ATFL capsule complex, and posterior lateral portion of her ankle and the peroneal tendons. She is dissing swelling, to the ATFL Of the right ankle and the peroneal tendons, she also has a tenderness her right midfoot. She had injury that occurred on 3/27/2022. I did review her x-rays, from March 1 of each the right ankle and right foot: These studies, are listed in diagnostic section of this report. In this individual, has distinct tenderness to the, to the right peroneal tendon sheath, anterolateral right ankle and to the dorsal part of her midfoot, I am concerned about a peroneal tendon brevis tendon tear,, and a occult midfoot injury in addition to her ankle sprain. Is been 6 weeks, since her injury, she still having pain and does walk with a limp. I am recommending, MRI each of the right ankle and right foot, second assess the peroneal brevis tendon, ATFL capsule complex, and the midfoot. The studies have prognostic value. Titushellen Pablo DIAGNOSES    1. Sprain of right ankle, unspecified ligament, initial encounter    2.  Foot sprain, right, sequela        Orders Placed This Encounter    Generic Supply Order     R Short CAM walker boot will be given and placed on patient    MRI FOOT RT WO CONT     Standing Status:   Future     Standing Expiration Date:   5/20/2022     Order Specific Question:   Region of foot     Answer:   Whole     Order Specific Question:   Reason for Exam     Answer:   right foot injury    MRI ANKLE RT WO CONT     Standing Status:   Future     Standing Expiration Date:   5/20/2022     Order Specific Question: Arthrogram study     Answer:   No            PLAN:    1. I will order an Mri of right ankle and foot  2. DME: Right Short CAM walker boot will be given and placed on patient    RTO: after MRI    There are no Patient Instructions on file for this visit. Please follow up with your PCP for any health maintenance as recommended         Radha Hartley  expresses understanding of the diagnosis, treatment plan, and all of their proposed questions were answered to their satisfaction. Patient education has been provided re the diagnoses. HPI //  OBJECTIVE EXAMINATION        Radha Hartley IS A 45 y.o. female who is a/an  new patient, presenting to my outpatient office for evaluation of  the following chief complaint(s):     Chief Complaint   Patient presents with    Foot Pain     Right (also complaining of numbness)    Ankle Pain     Right       Radha Hartley presents today w/ right foot injury that occurred 2//2022 . She recalls tripped over a elaine while moving frieght around. She states she fell pretty hard and her foot was still under the elaine in a externally rotated position. She took some Motrin after the incident. She went to the ED 3/01/2022 and they took XRs of her  Right ankel and foot. Denies any heart or kidney problems. H/O DM and hypertension. She had a mild heart attack in November 2021 and she stayed in the hospital for 3 days. Visit Vitals  Temp 97.5 °F (36.4 °C)   Ht 5' 6\" (1.676 m)   Wt 181 lb 12.8 oz (82.5 kg)   BMI 29.34 kg/m²       Appearance: Alert, well appearing and pleasant patient who is in no distress, oriented to person, place/time, and who follows commands. Normal dress/motor activity/thought processes/memory. This patient is accompanied in the examination room by her  self. Patient arrives to office via: without assistive device:     Psychiatric:  Normal Affect/mood. Judgement, behavior, and conduct are appropriate.  Speech normal in context and clarity, memory intact grossly, no involuntary movements - tremors. H EENT (2): Head normocephalic & atraumatic. Eye: pupils are round// EOM are intact // Neck: ROM WNL  // Hearings Intact   Respiratory: Breathing non labored     ANKLE/FOOT right    Gait: normal  Tenderness: Moderate over  anterior aspect and peroneal tendons. There is tenderness to the midfoot dorsal wart. Mild swelling is mild. No plantar ecchymosis  Cutaneous: There is distinct swelling, to the posterior lateral portion of the right ankle and the peroneal tendons, and along the ATFL capsule ligamentous complex of his right ankle. Joint Motion: She is painful range of motion, when I plantarflex and invert her ankle,  Joint / Tendon Stability:  No Ankle or Subtalar instability or joint laxity. No peroneal sublux ability or dislocation  Alignment: neutral Hindfoot,    Neuro Motor/Sensory: NL/NL  Vascular: NL foot/ankle pulses,   Lymphatics: No extremity lymphedema, No calf to the posterior lateral portion of ankle along the peroneal tendons, and to the anterior portion of her ankle, no tenderness to calf muscles. CHART REVIEW     Osiris Lacey has been experiencing pain and discomfort confirmed as outlined in the pain assessment outlined below.  was reviewed by  Jus Amaya MD on 4/20/2022. Pain Assessment  4/20/2022   Location of Pain Foot; Ankle   Location Modifiers Right   Severity of Pain 7   Quality of Pain Sharp;Dull;Aching; Throbbing   Duration of Pain Persistent   Duration of Pain Comment x 2 weeks   Frequency of Pain Constant   Aggravating Factors Walking;Standing;Squatting;Stairs   Relieving Factors Elevation   Result of Injury Yes   Type of Injury Fall        Osiris Lacey  has a past medical history of Asthma, Bronchitis, Cataract, Diabetes ([address] Endocrine disease, Endometriosis, Hypertension, Irregular bleeding, Migraine, Pelvic pain, and Thyroid disease.      Patients is employed at:          has a past medical history of Asthma, Bronchitis, Cataract, Diabetes ([facility] [address] Endocrine disease, Endometriosis, Hypertension, Irregular bleeding, Migraine, Pelvic pain, and Thyroid disease. has a past surgical history that includes hx gyn; hx gyn; hx hysterectomy; hx  section; and hx colonoscopy. family history includes Cancer in her mother; Dementia in her mother; Diabetes in her father and mother. Current Outpatient Medications   Medication Sig    albuterol (PROVENTIL HFA, VENTOLIN HFA, PROAIR HFA) 90 mcg/actuation inhaler Take 2 Puffs by inhalation every four (4) hours as needed for Wheezing.  atorvastatin (LIPITOR) 80 mg tablet Take 1 Tablet by mouth nightly. Indications: excessive fat in the blood    metFORMIN (GLUCOPHAGE) 1,000 mg tablet Take 1 Tablet by mouth two (2) times daily (with meals). Indications: type 2 diabetes mellitus    levothyroxine (SYNTHROID) 100 mcg tablet TAKE 1 TABLET BY MOUTH DAILY BEFORE BREAKFAST FOR HYPOTHYROIDISM    insulin detemir U-100 (Levemir U-100 Insulin) 100 unit/mL injection ADMINISTER 40 UNITS UNDER THE SKIN TWICE DAILY    flash glucose sensor (IntooStyle Sabrina 14 Day Sensor) kit USE TO CHECK BLOOD SUGAR BEFORE BREAKFAST, LUNCH AND DINNER AND AT BEDTIME    gabapentin (NEURONTIN) 300 mg capsule TAKE 1 CAPSULE BY MOUTH TID. AX DAILY AMOUNT: 90M0 MG    glucose blood VI test strips (blood glucose test) strip Use twice daily as directed, to match her glucometer    insulin aspart U-100 (NOVOLOG) 100 unit/mL (3 mL) inpn <150 BS/0 units, 151-200/2 units, 201-250/4 units, 251-300/6 units, 301-350/8 units, >351-400/10 units and call provider    Insulin Needles, Disposable, (pen needle, diabetic) 31 gauge x 1/4\" ndle Use twice daily for insulin injections    flash glucose scanning reader (FreeStyle Sabrina 14 Day [place]) misc 1 Each by Does Not Apply route Before breakfast, lunch, dinner and at bedtime.     lisinopril-hydroCHLOROthiazide (PRINZIDE, ZESTORETIC) 20-12.5 mg per tablet Take 1 Tablet by mouth two (2) times a day.  amLODIPine (NORVASC) 2.5 mg tablet Take 1 Tablet by mouth daily.  aspirin (ASPIRIN) 325 mg tablet Take 1 Tab by mouth daily.  Blood-Glucose Meter monitoring kit Test twice daily    cetirizine (ZYRTEC) 10 mg tablet Take 1 Tab by mouth daily. (Patient taking differently: Take 10 mg by mouth daily as needed.)    acetaminophen (TYLENOL) 325 mg tablet Take 2 Tabs by mouth every six (6) hours as needed for Pain. (Patient not taking: Reported on 5/21/2021)    polyethylene glycol (MIRALAX) 17 gram/dose powder 1 tablespoon with 8 oz of water every 2 hours for 3 doses. 1 tablespoon with 8 oz of water twice daily until stools are soft and regular. 1 tablespoon with 8 oz of water once daily for maintenance. (Patient not taking: Reported on 11/23/2021)     No current facility-administered medications for this visit. Allergies   Allergen Reactions    Sumatriptan Nausea and Vomiting    Vicodin [Hydrocodone-Acetaminophen] Shortness of Breath     Social History     Occupational History    Not on file   Tobacco Use    Smoking status: Never Smoker    Smokeless tobacco: Never Used   Vaping Use    Vaping Use: Never used   Substance and Sexual Activity    Alcohol use: Yes     Alcohol/week: 0.0 standard drinks     Comment: occasional    Drug use: No    Sexual activity: Yes     Birth control/protection: None       reports that she has never smoked. She has never used smokeless tobacco. She reports current alcohol use. She reports that she does not use drugs.       DIAGNOSTIC LAB DATA      Lab Results   Component Value Date/Time    Hemoglobin A1c 10.0 (H) 11/01/2020 11:31 PM    Hemoglobin A1c 13.4 (H) 03/28/2019 05:21 PM    Hemoglobin A1c 11.3 (H) 04/06/2016 10:18 AM    //   Lab Results   Component Value Date/Time    Glucose 244 (H) 11/01/2020 11:31 PM    Glucose (POC) 236 (H) 11/02/2020 11:32 AM    Glucose (POC) 141 (H) 06/04/2019 08:41 PM        Lab Results   Component Value Date/Time    Hemoglobin A1c (POC) 10.4 03/02/2022 11:40 AM         No results found for: VITD3, XQVID2, XQVID3, XQVID, VD3RIA, [id number]     Drug Screen Most Recent Result Date    No resulted procedures found. REVIEW OF SYSTEMS : 4/20/2022  ALL BELOW ARE Negative except : SEE HPI     All other systems reviewed and are negative. 12 point review of systems otherwise negative unless noted in HPI. RADIOGRAPHS// IMAGING//DIAGNOSTIC DATA     Orders Placed This Encounter    Generic Supply Order    MRI FOOT RT WO CONT    MRI ANKLE RT WO CONT         I have personally reviewed the images of the above study. The interpretation of this study is my professional opinion     XR Results (most recent):  Results from Hospital Encounter encounter on 03/01/22    XR FOOT RT MIN 3 V    Narrative  Right ankle and right foot    HISTORY: Right foot and ankle pain after working. Swelling of the toes. Lateral  ankle pain. FINDINGS: Frontal, oblique and lateral imaging of the right foot and right  ankle. There are 6 images. Compared with right ankle radiographs 10/15/2019 the  right foot is associated with a moderate hallux valgus. No acute fracture. Joint  spaces are otherwise preserved. There is mild dorsal soft tissue swelling of the  forefoot. No periosteal reaction or erosion. Chronic ossific fragment at the  dorsum of the navicular. Small calcaneal heel spur. No appreciable soft tissue swelling. Ankle mortise intact. No acute fracture. No  malalignment. Impression  No radiographic finding for an acute osseous process of the right ankle or foot. There is mild dorsal soft tissue swelling of the forefoot. Small posterior plantar calcaneal heel spurs. No significant change. XR ANKLE RT MIN 3 V: Patient Communication    Released Not seen       Study Result    Narrative & Impression   Right ankle and right foot     HISTORY: Right foot and ankle pain after working. Swelling of the toes.  Lateral  ankle pain.     FINDINGS: Frontal, oblique and lateral imaging of the right foot and right  ankle. There are 6 images. Compared with right ankle radiographs 10/15/2019 the  right foot is associated with a moderate hallux valgus. No acute fracture. Joint  spaces are otherwise preserved. There is mild dorsal soft tissue swelling of the  forefoot. No periosteal reaction or erosion. Chronic ossific fragment at the  dorsum of the navicular. Small calcaneal heel spur.     No appreciable soft tissue swelling. Ankle mortise intact. No acute fracture. No  malalignment.     IMPRESSION     No radiographic finding for an acute osseous process of the right ankle or foot.     There is mild dorsal soft tissue swelling of the forefoot.     Small posterior plantar calcaneal heel spurs. No significant change. I have reviewed the radiology transcribed results and I have reviewed the images of the above study. I have spent 35 minutes reviewing the previous notes, reviewing diagnostic studies [Advanced  Imaging, Diagnostic test results (x-rays)] and had a direct face to face with the patient discussing the diagnosis and importance of compliance with the treatment and plan. There is  discussion for the potential for surgery, answering all questions, as well as documenting patient care coordination for this individual on the day of the visit. Disclaimer:     Sections of this note are dictated using utilizing voice recognition software, which may have resulted in some phonetic based errors in grammar and contents. Even though attempts were made to correct all the mistakes, some may have been missed, and remained in the body of the document. If questions arise, please contact our department. An electronic signature was used to authenticate this note. Rhea Perez may have a reminder for a \"due or due soon\" health maintenance.  I have asked that she contact her primary care provider for follow-up on this health maintenance. There are no Patient Instructions on file for this visit. Please follow up with your PCP for any health maintenance as recommended.               Liam Bradley, as dictated by , Luz Puga  4/20/2022  7:57 AM

## 2022-04-20 ENCOUNTER — OFFICE VISIT (OUTPATIENT)
Dept: ORTHOPEDIC SURGERY | Age: 52
End: 2022-04-20
Payer: COMMERCIAL

## 2022-04-20 VITALS — HEIGHT: 66 IN | WEIGHT: 181.8 LBS | TEMPERATURE: 97.5 F | BODY MASS INDEX: 29.22 KG/M2

## 2022-04-20 DIAGNOSIS — S93.401A SPRAIN OF RIGHT ANKLE, UNSPECIFIED LIGAMENT, INITIAL ENCOUNTER: Primary | ICD-10-CM

## 2022-04-20 DIAGNOSIS — S93.601S FOOT SPRAIN, RIGHT, SEQUELA: ICD-10-CM

## 2022-04-20 PROCEDURE — 99203 OFFICE O/P NEW LOW 30 MIN: CPT | Performed by: ORTHOPAEDIC SURGERY

## 2022-05-09 ENCOUNTER — PATIENT MESSAGE (OUTPATIENT)
Dept: FAMILY MEDICINE CLINIC | Age: 52
End: 2022-05-09

## 2022-05-18 ENCOUNTER — TELEPHONE (OUTPATIENT)
Dept: MAMMOGRAPHY | Age: 52
End: 2022-05-18

## 2022-05-24 ENCOUNTER — TRANSCRIBE ORDER (OUTPATIENT)
Dept: SCHEDULING | Age: 52
End: 2022-05-24

## 2022-05-24 DIAGNOSIS — S13.9XXD ACUTE CERVICAL SPRAIN, SUBSEQUENT ENCOUNTER: Primary | ICD-10-CM

## 2022-06-12 ENCOUNTER — HOSPITAL ENCOUNTER (OUTPATIENT)
Age: 52
Discharge: HOME | End: 2022-06-12
Attending: ORTHOPAEDIC SURGERY
Payer: COMMERCIAL

## 2022-06-12 DIAGNOSIS — S13.9XXD ACUTE CERVICAL SPRAIN, SUBSEQUENT ENCOUNTER: ICD-10-CM

## 2022-06-12 PROCEDURE — 72141 MRI NECK SPINE W/O DYE: CPT

## 2022-06-14 DIAGNOSIS — I10 ESSENTIAL HYPERTENSION: ICD-10-CM

## 2022-06-15 RX ORDER — LISINOPRIL AND HYDROCHLOROTHIAZIDE 12.5; 2 MG/1; MG/1
1 TABLET ORAL 2 TIMES DAILY
Qty: 60 TABLET | Refills: 2 | Status: SHIPPED | OUTPATIENT
Start: 2022-06-15

## 2022-07-07 PROCEDURE — 99284 EMERGENCY DEPT VISIT MOD MDM: CPT

## 2022-07-08 ENCOUNTER — APPOINTMENT (OUTPATIENT)
Dept: CT IMAGING | Age: 52
End: 2022-07-08
Attending: EMERGENCY MEDICINE
Payer: COMMERCIAL

## 2022-07-08 ENCOUNTER — HOSPITAL ENCOUNTER (EMERGENCY)
Age: 52
Discharge: HOME | End: 2022-07-08
Attending: EMERGENCY MEDICINE
Payer: COMMERCIAL

## 2022-07-08 VITALS
WEIGHT: 187 LBS | BODY MASS INDEX: 30.18 KG/M2 | HEART RATE: 85 BPM | OXYGEN SATURATION: 99 % | DIASTOLIC BLOOD PRESSURE: 82 MMHG | TEMPERATURE: 98.2 F | SYSTOLIC BLOOD PRESSURE: 140 MMHG | RESPIRATION RATE: 16 BRPM

## 2022-07-08 DIAGNOSIS — E87.6 HYPOKALEMIA: ICD-10-CM

## 2022-07-08 DIAGNOSIS — K22.2 ESOPHAGEAL STRICTURE: Primary | ICD-10-CM

## 2022-07-08 LAB
ALBUMIN SERPL-MCNC: 3.7 G/DL (ref 3.4–5)
ALBUMIN/GLOB SERPL: 0.9 {RATIO} (ref 0.8–1.7)
ALP SERPL-CCNC: 99 U/L (ref 45–117)
ALT SERPL-CCNC: 23 U/L (ref 13–56)
ANION GAP SERPL CALC-SCNC: 2 MMOL/L (ref 3–18)
AST SERPL-CCNC: 32 U/L (ref 10–38)
BASOPHILS # BLD: 0 K/UL (ref 0–0.1)
BASOPHILS NFR BLD: 0 % (ref 0–2)
BILIRUB SERPL-MCNC: 0.5 MG/DL (ref 0.2–1)
BUN SERPL-MCNC: 20 MG/DL (ref 7–18)
BUN/CREAT SERPL: 19 (ref 12–20)
CALCIUM SERPL-MCNC: 9.8 MG/DL (ref 8.5–10.1)
CHLORIDE SERPL-SCNC: 102 MMOL/L (ref 100–111)
CO2 SERPL-SCNC: 32 MMOL/L (ref 21–32)
CREAT SERPL-MCNC: 1.07 MG/DL (ref 0.6–1.3)
DIFFERENTIAL METHOD BLD: ABNORMAL
EOSINOPHIL # BLD: 0.3 K/UL (ref 0–0.4)
EOSINOPHIL NFR BLD: 3 % (ref 0–5)
ERYTHROCYTE [DISTWIDTH] IN BLOOD BY AUTOMATED COUNT: 12.6 % (ref 11.6–14.5)
GLOBULIN SER CALC-MCNC: 4.3 G/DL (ref 2–4)
GLUCOSE SERPL-MCNC: 303 MG/DL (ref 74–99)
HCT VFR BLD AUTO: 38.6 % (ref 35–45)
HGB BLD-MCNC: 12.4 G/DL (ref 12–16)
IMM GRANULOCYTES # BLD AUTO: 0 K/UL (ref 0–0.04)
IMM GRANULOCYTES NFR BLD AUTO: 0 % (ref 0–0.5)
LYMPHOCYTES # BLD: 3.9 K/UL (ref 0.9–3.6)
LYMPHOCYTES NFR BLD: 44 % (ref 21–52)
MCH RBC QN AUTO: 25.8 PG (ref 24–34)
MCHC RBC AUTO-ENTMCNC: 32.1 G/DL (ref 31–37)
MCV RBC AUTO: 80.4 FL (ref 78–100)
MONOCYTES # BLD: 0.5 K/UL (ref 0.05–1.2)
MONOCYTES NFR BLD: 6 % (ref 3–10)
NEUTS SEG # BLD: 4.2 K/UL (ref 1.8–8)
NEUTS SEG NFR BLD: 47 % (ref 40–73)
NRBC # BLD: 0 K/UL (ref 0–0.01)
NRBC BLD-RTO: 0 PER 100 WBC
PLATELET # BLD AUTO: 309 K/UL (ref 135–420)
PMV BLD AUTO: 10.4 FL (ref 9.2–11.8)
POTASSIUM SERPL-SCNC: 3.1 MMOL/L (ref 3.5–5.5)
PROT SERPL-MCNC: 8 G/DL (ref 6.4–8.2)
RBC # BLD AUTO: 4.8 M/UL (ref 4.2–5.3)
SODIUM SERPL-SCNC: 136 MMOL/L (ref 136–145)
WBC # BLD AUTO: 8.9 K/UL (ref 4.6–13.2)

## 2022-07-08 PROCEDURE — 80053 COMPREHEN METABOLIC PANEL: CPT

## 2022-07-08 PROCEDURE — 70490 CT SOFT TISSUE NECK W/O DYE: CPT

## 2022-07-08 PROCEDURE — 85025 COMPLETE CBC W/AUTO DIFF WBC: CPT

## 2022-07-08 RX ORDER — POTASSIUM CHLORIDE 20 MEQ/1
20 TABLET, EXTENDED RELEASE ORAL 3 TIMES DAILY
Qty: 9 TABLET | Refills: 0 | Status: SHIPPED | OUTPATIENT
Start: 2022-07-08 | End: 2022-07-11

## 2022-07-08 NOTE — ED NOTES
Patient made aware of potential extended wait for room placement related to high volume in this ED. Patient advised to report any new or worsening symptoms immediately. Patient voices understanding. Will place in room once room available.

## 2022-07-08 NOTE — ED TRIAGE NOTES
Patient c/o trouble with swallowing (Food gets stuck) x 3 weeks and her asthma has been flaring up also. She states she vomits when she eats starches/breads.

## 2022-07-08 NOTE — ED PROVIDER NOTES
HPI 1year-old female who presents to ER with complaint having a problem swallowing x3 weeks. Patient states when she eats dialysis of breath her swallowing gets worse. No have history of sickle diverticulum. Patient has history of enlarged thyroid    Past Medical History:   Diagnosis Date    Asthma     Bronchitis     Cataract     Diabetes ([address]     Endocrine disease     hyperthyroidism    Endometriosis     Hypertension     Irregular bleeding     Migraine     Pelvic pain     Thyroid disease        Past Surgical History:   Procedure Laterality Date    HX  SECTION      x 3    HX COLONOSCOPY      HX GYN      partial hysterectomy    HX GYN      cervical laparoscopy; endometriosis    HX HYSTERECTOMY           Family History:   Problem Relation Age of Onset    Cancer Mother     Diabetes Mother     Dementia Mother     Diabetes Father        Social History     Socioeconomic History    Marital status:      Spouse name: Not on file    Number of children: Not on file    Years of education: Not on file    Highest education level: Not on file   Occupational History    Not on file   Tobacco Use    Smoking status: Never Smoker    Smokeless tobacco: Never Used   Vaping Use    Vaping Use: Never used   Substance and Sexual Activity    Alcohol use: Yes     Alcohol/week: 0.0 standard drinks     Comment: occasional    Drug use: No    Sexual activity: Yes     Birth control/protection: None   Other Topics Concern    Not on file   Social History Narrative    Not on file     Social Determinants of Health     Financial Resource Strain:     Difficulty of Paying Living Expenses: Not on file   Food Insecurity:     Worried About Running Out of Food in the Last Year: Not on file    Justin of Food in the Last Year: Not on file   Transportation Needs:     Lack of Transportation (Medical): Not on file    Lack of Transportation (Non-Medical):  Not on file   Physical Activity:     Days of Exercise per Week: Not on file    Minutes of Exercise per Session: Not on file   Stress:     Feeling of Stress : Not on file   Social Connections:     Frequency of Communication with Friends and Family: Not on file    Frequency of Social Gatherings with Friends and Family: Not on file    Attends Anabaptism Services: Not on file    Active Member of [address] [place] [address] [employer] or Organizations: Not on file    Attends Club or Organization Meetings: Not on file    Marital Status: Not on file   Intimate Partner Violence:     Fear of Current or Ex-Partner: Not on file    Emotionally Abused: Not on file    Physically Abused: Not on file    Sexually Abused: Not on file   Housing Stability:     Unable to Pay for Housing in the Last Year: Not on file    Number of [place] in the Last Year: Not on file    Unstable Housing in the Last Year: Not on file         ALLERGIES: Sumatriptan and Vicodin [hydrocodone-acetaminophen]    Review of Systems   Constitutional: Negative. HENT: Negative. Eyes: Negative. Respiratory: Negative. Cardiovascular: Negative. Gastrointestinal: Negative. Endocrine: Negative. Genitourinary: Negative. Musculoskeletal: Negative. Skin: Negative. Allergic/Immunologic: Negative. Neurological: Negative. Hematological: Negative. Psychiatric/Behavioral: Negative. All other systems reviewed and are negative. Vitals:    07/07/22 2219   BP: (!) 144/85   Pulse: 86   Resp: 16   Temp: 98.2 °F (36.8 °C)   SpO2: 99%   Weight: 84.8 kg (187 lb)            Physical Exam  Vitals and nursing note reviewed. Constitutional:       General: She is not in acute distress. Appearance: She is well-developed. She is not diaphoretic. HENT:      Head: Normocephalic. Right Ear: External ear normal.      Left Ear: External ear normal.      Mouth/Throat:      Pharynx: No oropharyngeal exudate. Eyes:      General: No scleral icterus. Right eye: No discharge.          Left eye: No discharge. Conjunctiva/sclera: Conjunctivae normal.      Pupils: Pupils are equal, round, and reactive to light. Neck:      Thyroid: No thyromegaly. Vascular: No JVD. Trachea: No tracheal deviation. Cardiovascular:      Rate and Rhythm: Normal rate and regular rhythm. Heart sounds: Normal heart sounds. No murmur heard. No friction rub. No gallop. Pulmonary:      Effort: Pulmonary effort is normal. No respiratory distress. Breath sounds: Normal breath sounds. No stridor. No wheezing or rales. Chest:      Chest wall: No tenderness. Abdominal:      General: Bowel sounds are normal. There is no distension. Palpations: Abdomen is soft. There is no mass. Tenderness: There is no abdominal tenderness. There is no guarding or rebound. Musculoskeletal:         General: No tenderness. Normal range of motion. Cervical back: Normal range of motion and neck supple. Lymphadenopathy:      Cervical: No cervical adenopathy. Skin:     General: Skin is warm and dry. Coloration: Skin is not pale. Findings: No erythema or rash. Neurological:      Mental Status: She is alert and oriented to person, place, and time. Cranial Nerves: No cranial nerve deficit. Motor: No abnormal muscle tone. Coordination: Coordination normal.      Deep Tendon Reflexes: Reflexes normal.          MDM       Procedures    Dif Diagnosis: Single diverticulum enlarged gallbladder, esophageal stricture, achalasia    CT of neck: interpreted by me    Dx: Esophageal stricture    DC home. Follow-up GI and 1 week. Clear liquids and soft foods a small portion is a seen by GI. Return ER needed    Dictation disclaimer:  Please note that this dictation was completed with [employer], the [employer] voice recognition software. Quite often unanticipated grammatical, syntax, homophones, and other interpretive errors are inadvertently transcribed by the computer software.   Please disregard these errors. Please excuse any errors that have escaped final proofreading.

## 2022-07-12 RX ORDER — CYCLOBENZAPRINE HCL 10 MG
10 TABLET ORAL
COMMUNITY
Start: 2022-06-25

## 2022-07-12 RX ORDER — IBUPROFEN 800 MG/1
800 TABLET ORAL
COMMUNITY
Start: 2022-06-25

## 2022-07-12 NOTE — PERIOP NOTES
PRE-SURGICAL INSTRUCTIONS        Patient's Name:  Brenda Dow      FFVLY'N Date:  7/12/2022            Covid Testing Date and Time:    Surgery Date:  7/14/2022                1. Do NOT eat or drink anything, including candy, gum, or ice chips after midnight on 07/13, unless you have specific instructions from your surgeon or anesthesia provider to do so.  2. You may brush your teeth before coming to the hospital.  3. No smoking 24 hours prior to the day of surgery. 4. No alcohol 24 hours prior to the day of surgery. 5. No recreational drugs for one week prior to the day of surgery. 6. Leave all valuables, including money/purse, at home. 7. Remove all jewelry, nail polish, acrylic nails, and makeup (including mascara); no lotions powders, deodorant, or perfume/cologne/after shave on the skin. 8. Follow instruction for Hibiclens washes and CHG wipes from surgeon's office. 9. Glasses/contact lenses and dentures may be worn to the hospital.  They will be removed prior to surgery. 10. Call your doctor if symptoms of a cold or illness develop within 24-48 hours prior to your surgery. 11.  If you are having an outpatient procedure, please make arrangements for a responsible ADULT TO [address] [place] [address] and stay with you for 24 hours after your surgery. 12. ONE VISITOR in the hospital at this time for outpatient procedures. Exceptions may be made for surgical admissions, per nursing unit guidelines      Special Instructions:      Bring COVID card. Bring list of CURRENT medications. Bring inhaler. Bring any pertinent legal medical records. Take these medications the morning of surgery with a sip of water:  BP pills ,Thyroid and Levemir Insulin . Follow physician instructions about insulin. Follow physician instructions about stopping anticoagulants. On the day of surgery, come in the main entrance of [facility]. Let the  at the desk know you are there for surgery. A staff member will come escort you to the surgical area on the second floor. If you have any questions or concerns, please do not hesitate to call:     (Prior to the day of surgery) [facility] department:  [phone]   (Day of surgery) Pre-Op department:  [phone]    These surgical instructions were reviewed with Trevor Park during the [facility] phone call.

## 2022-07-13 ENCOUNTER — ANESTHESIA EVENT (OUTPATIENT)
Dept: ENDOSCOPY | Age: 52
End: 2022-07-13
Payer: COMMERCIAL

## 2022-07-14 ENCOUNTER — ANESTHESIA (OUTPATIENT)
Dept: ENDOSCOPY | Age: 52
End: 2022-07-14
Payer: COMMERCIAL

## 2022-07-14 ENCOUNTER — HOSPITAL ENCOUNTER (OUTPATIENT)
Age: 52
Setting detail: OUTPATIENT SURGERY
Discharge: HOME | End: 2022-07-14
Attending: INTERNAL MEDICINE | Admitting: INTERNAL MEDICINE
Payer: COMMERCIAL

## 2022-07-14 VITALS
WEIGHT: 178 LBS | HEART RATE: 2 BPM | TEMPERATURE: 97.9 F | RESPIRATION RATE: 18 BRPM | OXYGEN SATURATION: 98 % | SYSTOLIC BLOOD PRESSURE: 137 MMHG | BODY MASS INDEX: 28.61 KG/M2 | DIASTOLIC BLOOD PRESSURE: 86 MMHG | HEIGHT: 66 IN

## 2022-07-14 LAB
GLUCOSE BLD STRIP.AUTO-MCNC: 162 MG/DL (ref 70–110)
GLUCOSE BLD STRIP.AUTO-MCNC: 245 MG/DL (ref 70–110)
GLUCOSE BLD STRIP.AUTO-MCNC: 301 MG/DL (ref 70–110)
POTASSIUM SERPL-SCNC: 3.5 MMOL/L (ref 3.5–5.5)

## 2022-07-14 PROCEDURE — 74011636637 HC RX REV CODE- 636/637: Performed by: NURSE ANESTHETIST, CERTIFIED REGISTERED

## 2022-07-14 PROCEDURE — 84132 ASSAY OF SERUM POTASSIUM: CPT

## 2022-07-14 PROCEDURE — 82962 GLUCOSE BLOOD TEST: CPT

## 2022-07-14 PROCEDURE — 74011000250 HC RX REV CODE- 250: Performed by: INTERNAL MEDICINE

## 2022-07-14 PROCEDURE — 88305 TISSUE EXAM BY PATHOLOGIST: CPT

## 2022-07-14 PROCEDURE — 76040000019: Performed by: INTERNAL MEDICINE

## 2022-07-14 PROCEDURE — 77030019988 HC FCPS ENDOSC DISP BSC -B: Performed by: INTERNAL MEDICINE

## 2022-07-14 PROCEDURE — 77030008565 HC TBNG SUC IRR ERBE -B: Performed by: INTERNAL MEDICINE

## 2022-07-14 PROCEDURE — 88342 IMHCHEM/IMCYTCHM 1ST ANTB: CPT

## 2022-07-14 PROCEDURE — 2709999900 HC NON-CHARGEABLE SUPPLY: Performed by: INTERNAL MEDICINE

## 2022-07-14 PROCEDURE — 74011250636 HC RX REV CODE- 250/636: Performed by: NURSE ANESTHETIST, CERTIFIED REGISTERED

## 2022-07-14 PROCEDURE — 74011250636 HC RX REV CODE- 250/636: Performed by: INTERNAL MEDICINE

## 2022-07-14 PROCEDURE — 76060000031 HC ANESTHESIA FIRST 0.5 HR: Performed by: INTERNAL MEDICINE

## 2022-07-14 PROCEDURE — 00731 ANES UPR GI NDSC PX NOS: CPT | Performed by: ANESTHESIOLOGY

## 2022-07-14 RX ORDER — SODIUM CHLORIDE 0.9 % (FLUSH) 0.9 %
5-40 SYRINGE (ML) INJECTION EVERY 8 HOURS
Status: DISCONTINUED | OUTPATIENT
Start: 2022-07-14 | End: 2022-07-14 | Stop reason: HOSPADM

## 2022-07-14 RX ORDER — LIDOCAINE HYDROCHLORIDE 10 MG/ML
0.1 INJECTION, SOLUTION EPIDURAL; INFILTRATION; INTRACAUDAL; PERINEURAL AS NEEDED
Status: DISCONTINUED | OUTPATIENT
Start: 2022-07-14 | End: 2022-07-14 | Stop reason: HOSPADM

## 2022-07-14 RX ORDER — INSULIN LISPRO 100 [IU]/ML
INJECTION, SOLUTION INTRAVENOUS; SUBCUTANEOUS ONCE
Status: COMPLETED | OUTPATIENT
Start: 2022-07-14 | End: 2022-07-14

## 2022-07-14 RX ORDER — SODIUM CHLORIDE 0.9 % (FLUSH) 0.9 %
5-40 SYRINGE (ML) INJECTION AS NEEDED
Status: DISCONTINUED | OUTPATIENT
Start: 2022-07-14 | End: 2022-07-14 | Stop reason: HOSPADM

## 2022-07-14 RX ORDER — PROPOFOL 10 MG/ML
INJECTION, EMULSION INTRAVENOUS AS NEEDED
Status: DISCONTINUED | OUTPATIENT
Start: 2022-07-14 | End: 2022-07-14 | Stop reason: HOSPADM

## 2022-07-14 RX ORDER — SODIUM CHLORIDE, SODIUM LACTATE, POTASSIUM CHLORIDE, CALCIUM CHLORIDE 600; 310; 30; 20 MG/100ML; MG/100ML; MG/100ML; MG/100ML
25 INJECTION, SOLUTION INTRAVENOUS CONTINUOUS
Status: DISCONTINUED | OUTPATIENT
Start: 2022-07-14 | End: 2022-07-14 | Stop reason: HOSPADM

## 2022-07-14 RX ORDER — FAMOTIDINE 20 MG/1
20 TABLET, FILM COATED ORAL ONCE
Status: DISCONTINUED | OUTPATIENT
Start: 2022-07-14 | End: 2022-07-14

## 2022-07-14 RX ADMIN — SODIUM CHLORIDE, POTASSIUM CHLORIDE, SODIUM LACTATE AND CALCIUM CHLORIDE 25 ML/HR: 600; 310; 30; 20 INJECTION, SOLUTION INTRAVENOUS at 08:54

## 2022-07-14 RX ADMIN — PROPOFOL 100 MG: 10 INJECTION, EMULSION INTRAVENOUS at 10:45

## 2022-07-14 RX ADMIN — INSULIN LISPRO 12 UNITS: 100 INJECTION, SOLUTION INTRAVENOUS; SUBCUTANEOUS at 09:01

## 2022-07-14 RX ADMIN — PROPOFOL 50 MG: 10 INJECTION, EMULSION INTRAVENOUS at 10:47

## 2022-07-14 RX ADMIN — FAMOTIDINE 20 MG: 10 INJECTION, SOLUTION INTRAVENOUS at 08:54

## 2022-07-14 NOTE — ANESTHESIA PREPROCEDURE EVALUATION
Relevant Problems   No relevant active problems       Anesthetic History   No history of anesthetic complications            Review of Systems / Medical History  Patient summary reviewed, nursing notes reviewed and pertinent labs reviewed    Pulmonary            Asthma : well controlled       Neuro/Psych       CVA  TIA and headaches    Comments: Chronic Migraine  2/week Cardiovascular    Hypertension: well controlled              Exercise tolerance: >4 METS     GI/Hepatic/Renal  Within defined limits              Endo/Other    Diabetes: poorly controlled, type 2  Hypothyroidism: well controlled       Other Findings   Comments: A1C 13.4           Physical Exam    Airway  Mallampati: II  TM Distance: 4 - 6 cm  Neck ROM: normal range of motion   Mouth opening: Normal     Cardiovascular  Regular rate and rhythm,  S1 and S2 normal,  no murmur, click, rub, or gallop  Rhythm: regular  Rate: normal         Dental  No notable dental hx       Pulmonary  Breath sounds clear to auscultation               Abdominal  GI exam deferred       Other Findings            Anesthetic Plan    ASA: 3  Anesthesia type: MAC          Induction: Intravenous  Anesthetic plan and risks discussed with: Patient

## 2022-07-14 NOTE — ANESTHESIA POSTPROCEDURE EVALUATION
Procedure(s):  UPPER ENDOSCOPY / dilation 52Fr, Bx's. MAC    Anesthesia Post Evaluation      Multimodal analgesia: multimodal analgesia used between 6 hours prior to anesthesia start to PACU discharge  Patient location during evaluation: bedside  Patient participation: complete - patient participated  Level of consciousness: awake  Pain management: adequate  Airway patency: patent  Anesthetic complications: no  Cardiovascular status: stable  Respiratory status: acceptable  Hydration status: acceptable  Post anesthesia nausea and vomiting:  controlled  Final Post Anesthesia Temperature Assessment:  Normothermia (36.0-37.5 degrees C)      INITIAL Post-op Vital signs:   Vitals Value Taken Time   /74 07/14/22 1116   Temp 36.7 °C (98 °F) 07/14/22 1053   Pulse 76 07/14/22 1120   Resp 14 07/14/22 1120   SpO2 100 % 07/14/22 1120   Vitals shown include unvalidated device data.

## 2022-07-14 NOTE — DISCHARGE INSTRUCTIONS
Upper GI Endoscopy: What to Expect at [address] [place] [address]  After you have an endoscopy, you will stay at the hospital or clinic for 1 to 2 hours. This will allow the medicine to wear off. You will be able to go home after your doctor or nurse checks to make sure you are not having any problems. You may have to stay overnight if you had treatment during the test. You may have a sore throat for a day or two after the test.  This care sheet gives you a general idea about what to expect after the test.  How can you care for yourself at home? Activity  · Rest as much as you need to after you go home. · You should be able to go back to your usual activities the day after the test.  Diet  · Follow your doctor's directions for eating after the test.  · Drink plenty of fluids (unless your doctor has told you not to). Medications  · If you have a sore throat the day after the test, use an over-the-counter spray to numb your throat. Follow-up care is a key part of your treatment and safety. Be sure to make and go to all appointments, and call your doctor if you are having problems. It's also a good idea to know your test results and keep a list of the medicines you take. When should you call for help? Call 911 anytime you think you may need emergency care. For example, call if:  · You passed out (lost consciousness). · You cough up blood. · You vomit blood or what looks like coffee grounds. · You pass maroon or very bloody stools. Call your doctor now or seek immediate medical care if:  · You have trouble swallowing. · You have belly pain. · Your stools are black and tarlike or have streaks of blood. · You are sick to your stomach or cannot keep fluids down. Watch closely for changes in your health, and be sure to contact your doctor if:  · Your throat still hurts after a day or two. · You do not get better as expected. Where can you learn more?    Go to DealExpl[employer]be  Enter J454 in the search box to learn more about \"Upper GI Endoscopy: What to Expect at Home. \"   © 0575-0419 Healthwise[employer]. Care instructions adapted under license by [employer] (which disclaims liability or warranty for this information). This care instruction is for use with your licensed healthcare professional. If you have questions about a medical condition or this instruction, always ask your healthcare professional. [address]  any warranty or liability for your use of this information. Content Version: 56.1.717007; Current as of: November 14, 2014    DISCHARGE SUMMARY from Nurse     POST-PROCEDURE INSTRUCTIONS:    Call your Physician if you:  ? Observe any excess bleeding. ? Develop a temperature over 100.5o F.  ? Experience abdominal, shoulder or chest pain. ? Notice any signs of decreased circulation or nerve impairment to an extremity such as a change in color, persistent numbness, tingling, coldness or increase in pain. ? Vomit blood or you have nausea and vomiting lasting longer than 4 hours. ? Are unable to take medications. ? Are unable to urinate within 8 hours after discharge following general anesthesia or intravenous sedation. For the next 24 hours after receiving general anesthesia or intravenous sedation, or while taking prescription Narcotics, limit your activities:  ? Do NOT drive a motor vehicle, operate hazard machinery or power tools, or perform tasks that require coordination. The medication you received during your procedure may have some effect on your mental awareness. ? Do NOT make important personal or business decisions. The medication you received during your procedure may have some effect on your mental awareness. ? Do NOT drink alcoholic beverages. These drinks do not mix well with the medications that have been given to you. ? Upon discharge from the hospital, you must be accompanied by a responsible adult. ?  Resume your diet as directed by your physician. ? Resume medications as your physician has prescribed. ? Please give a list of your current medications to your Primary Care Provider. ? Please update this list whenever your medications are discontinued, doses are changed, or new medications (including over-the-counter products) are added. ? Please carry medication information at all times in case of emergency situations. These are general instructions for a healthy lifestyle:    No smoking/ No tobacco products/ Avoid exposure to second hand smoke.  Surgeon General's Warning:  Quitting smoking now greatly reduces serious risk to your health. Obesity, smoking, and a sedentary lifestyle greatly increase your risk for illness.  A healthy diet, regular physical exercise & weight monitoring are important for maintaining a healthy lifestyle   You may be retaining fluid if you have a history of heart failure or if you experience any of the following symptoms:  Weight gain of 3 pounds or more overnight or 5 pounds in a week, increased swelling in our hands or feet or shortness of breath while lying flat in bed. Please call your doctor as soon as you notice any of these symptoms; do not wait until your next office visit. Colorectal Screening   Colorectal cancer almost always develops from precancerous polyps (abnormal growths) in the colon or rectum. Screening tests can find precancerous polyps, so that they can be removed before they turn into cancer. Screening tests can also find colorectal cancer early, when treatment works best.  Caitlyn Wood Speak with your physician about when you should begin screening and how often you should be tested  Caitlyn Wood   Additional Information    Educational references and/or instructions provided during this visit included:    See attached. APPOINTMENTS:    Per MD Instruction. Discharge information has been reviewed with the patient. The patient verbalized understanding.     Patient Education   Patient Education        Gastritis: Care Instructions  Your Care Instructions     Gastritis is a sore and upset stomach. It happens when something irritates the stomach lining. Many things can cause it. These include an infection such as the flu or something you ate or drank. Medicines or a sore on the lining of the stomach (ulcer) also can cause it. Your belly may bloat and ache. You may belch, vomit, and feel sick to your stomach. You should be able to relieve the problem by taking medicine. And it may help to change your diet. If gastritis lasts, your doctor may prescribe medicine. Follow-up care is a key part of your treatment and safety. Be sure to make and go to all appointments, and call your doctor if you are having problems. It's also a good idea to know your test results and keep a list of the medicines you take. How can you care for yourself at home? · If your doctor prescribed antibiotics, take them as directed. Do not stop taking them just because you feel better. You need to take the full course of antibiotics. · Be safe with medicines. If your doctor prescribed medicine to decrease stomach acid, take it as directed. Call your doctor if you think you are having a problem with your medicine. · Do not take any other medicine, including over-the-counter pain relievers, without talking to your doctor first.  · If your doctor recommends over-the-counter medicine to reduce stomach acid, such as Pepcid AC (famotidine), Prilosec (omeprazole), or Tagamet HB (cimetidine) follow the directions on the label. · Drink plenty of fluids to prevent dehydration. Choose water and other clear liquids. If you have kidney, heart, or liver disease and have to limit fluids, talk with your doctor before you increase the amount of fluids you drink. · Avoid foods that make your symptoms worse.  These may include chocolate, mint, alcohol, pepper, spicy foods, high-fat foods, or drinks with caffeine in them, such as tea, coffee, georgina, or energy drinks. If your symptoms are worse after you eat a certain food, you may want to stop eating it to see if your symptoms get better. When should you call for help? Call 911 anytime you think you may need emergency care. For example, call if:    · You vomit blood or what looks like coffee grounds.     · You pass maroon or very bloody stools. Call your doctor now or seek immediate medical care if:    · You start breathing fast and have not produced urine in the last 8 hours.     · You cannot keep fluids down. Watch closely for changes in your health, and be sure to contact your doctor if:    · You do not get better as expected. Where can you learn more? Go to http://www.lim.com/  Enter Z536 in the search box to learn more about \"Gastritis: Care Instructions. \"  Current as of: September 8, 2021               Content Version: 13.2  © 2006-2022 [employer]. Care instructions adapted under license by [employer] (which disclaims liability or warranty for this information). If you have questions about a medical condition or this instruction, always ask your healthcare professional. [place] [address] any warranty or liability for your use of this information. Hiatal Hernia: Care Instructions  Your Care Instructions  A hiatal hernia occurs when part of the stomach bulges into the chest cavity. A hiatal hernia may allow stomach acid and juices to back up into the esophagus (acid reflux). This can cause a feeling of burning, warmth, heat, or pain behind the breastbone. This feeling may often occur after you eat, soon after you lie down, or when you bend forward, and it may come and go. You also may have a sour taste in your mouth. These symptoms are commonly known as heartburn or reflux. But not all hiatal hernias cause symptoms. Follow-up care is a key part of your treatment and safety.  Be sure to make and go to all appointments, and call your doctor if you are having problems. It's also a good idea to know your test results and keep a list of the medicines you take. How can you care for yourself at home? · Take your medicines exactly as prescribed. Call your doctor if you think you are having a problem with your medicine. · Do not take aspirin or other nonsteroidal anti-inflammatory drugs (NSAIDs), such as ibuprofen (Advil, Motrin) or naproxen (Aleve), unless your doctor says it is okay. Ask your doctor what you can take for pain. · Your doctor may recommend over-the-counter medicine. For mild or occasional indigestion, antacids such as Tums, Gaviscon, Maalox, or Mylanta may help. Your doctor also may recommend over-the-counter acid reducers, such as famotidine (Pepcid AC), cimetidine (Tagamet HB), or omeprazole (Prilosec). Read and follow all instructions on the label. If you use these medicines often, talk with your doctor. · Change your eating habits. ? It's best to eat several small meals instead of two or three large meals. ? After you eat, wait 2 to 3 hours before you lie down. Late-night snacks aren't a good idea. ? Avoid foods that make your symptoms worse. These may include chocolate, mint, alcohol, pepper, spicy foods, high-fat foods, or drinks with caffeine in them, such as tea, coffee, georgina, or energy drinks. If your symptoms are worse after you eat a certain food, you may want to stop eating it to see if your symptoms get better. · Do not smoke or chew tobacco.  · If you get heartburn at night, raise the head of your bed 6 to 8 inches by putting the frame on blocks or placing a foam wedge under the head of your mattress. (Adding extra pillows does not work.)  · Do not wear tight clothing around your middle. · Lose weight if you need to. Losing just 5 to 10 pounds can help. When should you call for help?    Call your doctor now or seek immediate medical care if:    · You have new or worse belly pain.     · You are vomiting. Watch closely for changes in your health, and be sure to contact your doctor if:    · You have new or worse symptoms of indigestion.     · You have trouble or pain swallowing.     · You are losing weight.     · You do not get better as expected. Where can you learn more? Go to http://www.lim.com/  Enter T074 in the search box to learn more about \"Hiatal Hernia: Care Instructions. \"  Current as of: September 8, 2021               Content Version: 13.2  © 2006-2022 [employer]. Care instructions adapted under license by [employer] (which disclaims liability or warranty for this information). If you have questions about a medical condition or this instruction, always ask your healthcare professional. [address] any warranty or liability for your use of this information.

## 2022-07-14 NOTE — H&P
[email or web address] [address]  [place], [address]        Impression:   1.dysphagia       Plan:     1. egd dil bx mac all risks benefits and alt discussed       Chief Complaint: dysphagia       HPI:  Manny Ibanez is a 45 y.o. female who is being seen on consult for dysphagia . Negar Logan PMH:   Past Medical History:   Diagnosis Date    Adverse effect of anesthesia     Slow to awaken    Asthma     Bronchitis     Cataract     Diabetes ([facility] [address]     IDDM    Endocrine disease     hyperthyroidism    Endometriosis     Hypertension     Irregular bleeding     Migraine     Pelvic pain     Stroke ([facility] [address] 2019    No weakness    Thyroid disease     hypothyroid cc       PSH:   Past Surgical History:   Procedure Laterality Date    HX  SECTION      x 3    HX COLONOSCOPY      HX CORNEAL TRANSPLANT Bilateral 2021    Lens implants    HX HYSTERECTOMY      HX OOPHORECTOMY      HX PELVIC LAPAROSCOPY         Social HX:   Social History     Socioeconomic History    Marital status:      Spouse name: Not on file    Number of children: Not on file    Years of education: Not on file    Highest education level: Not on file   Occupational History    Not on file   Tobacco Use    Smoking status: Never Smoker    Smokeless tobacco: Never Used   Vaping Use    Vaping Use: Never used   Substance and Sexual Activity    Alcohol use:  Yes     Alcohol/week: 0.0 standard drinks     Comment: occasional    Drug use: Never    Sexual activity: Yes     Birth control/protection: None   Other Topics Concern    Not on file   Social History Narrative    Not on file     Social Determinants of Health     Financial Resource Strain:     Difficulty of Paying Living Expenses: Not on file   Food Insecurity:     Worried About Running Out of Food in the Last Year: Not on file    Justin of Food in the Last Year: Not on file   Transportation Needs:     Lack of Transportation (Medical): Not on file    Lack of Transportation (Non-Medical): Not on file   Physical Activity:     Days of Exercise per Week: Not on file    Minutes of Exercise per Session: Not on file   Stress:     Feeling of Stress : Not on file   Social Connections:     Frequency of Communication with Friends and Family: Not on file    Frequency of Social Gatherings with Friends and Family: Not on file    Attends [employer]ism Services: Not on file    Active Member of [address] [place] [address] or Organizations: Not on file    Attends Club or Organization Meetings: Not on file    Marital Status: Not on file   Intimate Partner Violence:     Fear of Current or Ex-Partner: Not on file    Emotionally Abused: Not on file    Physically Abused: Not on file    Sexually Abused: Not on file   Housing Stability:     Unable to Pay for Housing in the Last Year: Not on file    Number of [place] in the Last Year: Not on file    Unstable Housing in the Last Year: Not on file       FHX:   Family History   Problem Relation Age of Onset    Cancer Mother     Diabetes Mother     Dementia Mother     Diabetes Father        Allergy:   Allergies   Allergen Reactions    Sumatriptan Nausea and Vomiting    Vicodin [Hydrocodone-Acetaminophen] Shortness of Breath       Home Medications:     Medications Prior to Admission   Medication Sig    lisinopril-hydroCHLOROthiazide (PRINZIDE, ZESTORETIC) 20-12.5 mg per tablet Take 1 Tablet by mouth two (2) times a day.  amLODIPine (NORVASC) 2.5 mg tablet TAKE 1 TABLET BY MOUTH DAILY    albuterol (PROVENTIL HFA, VENTOLIN HFA, PROAIR HFA) 90 mcg/actuation inhaler Take 2 Puffs by inhalation every four (4) hours as needed for Wheezing.     levothyroxine (SYNTHROID) 100 mcg tablet TAKE 1 TABLET BY MOUTH DAILY BEFORE BREAKFAST FOR HYPOTHYROIDISM    insulin detemir U-100 (Levemir U-100 Insulin) 100 unit/mL injection ADMINISTER 40 UNITS UNDER THE SKIN TWICE DAILY    cyclobenzaprine (FLEXERIL) 10 mg tablet Take 10 mg by mouth nightly.  ibuprofen (MOTRIN) 800 mg tablet Take 800 mg by mouth every six (6) hours as needed.  atorvastatin (LIPITOR) 80 mg tablet Take 1 Tablet by mouth nightly. Indications: excessive fat in the blood    metFORMIN (GLUCOPHAGE) 1,000 mg tablet Take 1 Tablet by mouth two (2) times daily (with meals). Indications: type 2 diabetes mellitus    flash glucose sensor (FreeStyle Sabrina 14 Day Sensor) kit USE TO CHECK BLOOD SUGAR BEFORE BREAKFAST, LUNCH AND DINNER AND AT BEDTIME    gabapentin (NEURONTIN) 300 mg capsule TAKE 1 CAPSULE BY MOUTH TID. AX DAILY AMOUNT: 90M0 MG    glucose blood VI test strips (blood glucose test) strip Use twice daily as directed, to match her glucometer    insulin aspart U-100 (NOVOLOG) 100 unit/mL (3 mL) inpn <150 BS/0 units, 151-200/2 units, 201-250/4 units, 251-300/6 units, 301-350/8 units, >351-400/10 units and call provider    Insulin Needles, Disposable, (pen needle, diabetic) 31 gauge x 1/4\" ndle Use twice daily for insulin injections    flash glucose scanning reader (FreeStyle Sabrina 14 Day [place]) misc 1 Each by Does Not Apply route Before breakfast, lunch, dinner and at bedtime.  aspirin (ASPIRIN) 325 mg tablet Take 1 Tab by mouth daily.  Blood-Glucose Meter monitoring kit Test twice daily    cetirizine (ZYRTEC) 10 mg tablet Take 1 Tab by mouth daily. (Patient taking differently: Take 10 mg by mouth daily as needed.)       Review of Systems:     Constitutional: No fevers, chills, weight loss, fatigue. Skin: No rashes, pruritis, jaundice, ulcerations, erythema. HENT: No headaches, nosebleeds, sinus pressure, rhinorrhea, sore throat. Eyes: No visual changes, blurred vision, eye pain, photophobia, jaundice. Cardiovascular: No chest pain, heart palpitations. Respiratory: No cough, SOB, wheezing, chest discomfort, orthopnea. Gastrointestinal: Dysphagia    Genitourinary: No dysuria, bleeding, discharge, pyuria.    Musculoskeletal: No weakness, arthralgias, wasting. Endo: No sweats. Heme: No bruising, easy bleeding. Allergies: As noted. Neurological: Cranial nerves intact. Alert and oriented. Gait not assessed. Psychiatric:  No anxiety, depression, hallucinations. Visit Vitals  Ht 5' 6\" (1.676 m)   Wt 79.8 kg (176 lb)   BMI 28.41 kg/m²       Physical Assessment:     constitutional: appearance: well developed, well nourished, normal habitus, no deformities, in no acute distress. skin: inspection: no rashes, ulcers, icterus or other lesions; no clubbing or telangiectasias. palpation: no induration or subcutaneos nodules. eyes: inspection: normal conjunctivae and lids; no jaundice pupils: symmetrical, normoreactive to light, normal accommodation and size. ENMT: mouth: normal oral mucosa,lips and gums; good dentition. oropharynx: normal tongue, hard and soft palate; posterior pharynx without erythema, exudate or lesions. neck: no masses organomegaly or tenderness. respiratory: effort: normal chest excursion; no intercostal retraction or accessory muscle use. cardiovascular: abdominal aorta: normal size and position; no bruits. palpation: PMI of normal size and position; normal rhythm; no thrill or murmurs. abdominal: abdomen: normal consistency; no tenderness or masses. hernias: no hernias appreciated. liver: normal size and consistency. spleen: not palpable. rectal: hemoccult/guaiac: not performed. musculoskeletal: no deformities or muscle wasting   lymphatic: axilae: not palpable. groin: not palpable. neck: within normal limits. other: not palpable. neurologic: cranial nerves: II-XII normal.   psychiatric: judgement/insight: within normal limits. memory: within normal limits for recent and remote events. mood and affect: no evidence of depression, anxiety or agitation. orientation: oriented to time, space and person.         Basic Metabolic Profile   No results for input(s): NA, K, CL, CO2, BUN, GLU, CA, MG, PHOS in the last 72 hours. No lab exists for component: CREAT      CBC w/Diff    No results for input(s): WBC, RBC, HGB, HCT, MCV, MCH, MCHC, RDW, PLT, HGBEXT, HCTEXT, PLTEXT in the last 72 hours. No lab exists for component: MPV No results for input(s): GRANS, LYMPH, EOS, PRO, MYELO, METAS, BLAST in the last 72 hours. No lab exists for component: MONO, BASO     Hepatic Function   No results for input(s): ALB, TP, TBILI, AP, AML, LPSE in the last 72 hours. No lab exists for component: DBILI, GPT, SGOT       Julio Koenig MD, M.D. [facility], [address] [place] [address]  [email or web address]

## 2022-11-30 ENCOUNTER — TELEPHONE (OUTPATIENT)
Dept: FAMILY MEDICINE CLINIC | Age: 52
End: 2022-11-30

## 2022-11-30 NOTE — TELEPHONE ENCOUNTER
Left patient vm advising appointment has to be cancelled due to provider does not accept Gabriel Yanez and can scheduled with different provider.

## 2023-01-18 ENCOUNTER — HOSPITAL ENCOUNTER (OUTPATIENT)
Dept: LAB | Age: 53
Discharge: HOME | End: 2023-01-18
Payer: COMMERCIAL

## 2023-01-18 ENCOUNTER — OFFICE VISIT (OUTPATIENT)
Dept: FAMILY MEDICINE CLINIC | Age: 53
End: 2023-01-18
Payer: COMMERCIAL

## 2023-01-18 VITALS
HEIGHT: 66 IN | WEIGHT: 171.6 LBS | TEMPERATURE: 97.3 F | OXYGEN SATURATION: 20 % | RESPIRATION RATE: 20 BRPM | DIASTOLIC BLOOD PRESSURE: 102 MMHG | SYSTOLIC BLOOD PRESSURE: 180 MMHG | HEART RATE: 89 BPM | BODY MASS INDEX: 27.58 KG/M2

## 2023-01-18 DIAGNOSIS — E03.9 ACQUIRED HYPOTHYROIDISM: Primary | ICD-10-CM

## 2023-01-18 DIAGNOSIS — Z11.59 NEED FOR HEPATITIS C SCREENING TEST: ICD-10-CM

## 2023-01-18 DIAGNOSIS — E78.5 HYPERLIPIDEMIA ASSOCIATED WITH TYPE 2 DIABETES MELLITUS (HCC): ICD-10-CM

## 2023-01-18 DIAGNOSIS — G62.9 NEUROPATHY: ICD-10-CM

## 2023-01-18 DIAGNOSIS — I10 ESSENTIAL HYPERTENSION: ICD-10-CM

## 2023-01-18 DIAGNOSIS — E11.69 HYPERLIPIDEMIA ASSOCIATED WITH TYPE 2 DIABETES MELLITUS (HCC): ICD-10-CM

## 2023-01-18 DIAGNOSIS — Z79.899 HIGH RISK MEDICATION USE: ICD-10-CM

## 2023-01-18 DIAGNOSIS — E03.9 ACQUIRED HYPOTHYROIDISM: ICD-10-CM

## 2023-01-18 DIAGNOSIS — E11.40 TYPE 2 DIABETES, CONTROLLED, WITH NEUROPATHY (HCC): ICD-10-CM

## 2023-01-18 DIAGNOSIS — Z23 ENCOUNTER FOR IMMUNIZATION: ICD-10-CM

## 2023-01-18 DIAGNOSIS — Z12.11 COLON CANCER SCREENING: ICD-10-CM

## 2023-01-18 DIAGNOSIS — J45.20 MILD INTERMITTENT ASTHMA WITHOUT COMPLICATION: ICD-10-CM

## 2023-01-18 LAB
ALBUMIN SERPL-MCNC: 3.8 G/DL (ref 3.4–5)
ALBUMIN/GLOB SERPL: 1 (ref 0.8–1.7)
ALP SERPL-CCNC: 99 U/L (ref 45–117)
ALT SERPL-CCNC: 24 U/L (ref 13–56)
AMPHET UR QL SCN: NEGATIVE
ANION GAP SERPL CALC-SCNC: 4 MMOL/L (ref 3–18)
APPEARANCE UR: ABNORMAL
APPEARANCE UR: NORMAL
AST SERPL-CCNC: 40 U/L (ref 10–38)
BACTERIA URNS QL MICRO: NEGATIVE /HPF
BACTERIA URNS QL MICRO: NORMAL /HPF
BARBITURATES UR QL SCN: NEGATIVE
BASOPHILS # BLD: 0 K/UL (ref 0–0.1)
BASOPHILS NFR BLD: 0 % (ref 0–2)
BENZODIAZ UR QL: NEGATIVE
BILIRUB SERPL-MCNC: 0.7 MG/DL (ref 0.2–1)
BILIRUB UR QL: NEGATIVE
BILIRUB UR QL: NORMAL
BUN SERPL-MCNC: 11 MG/DL (ref 7–18)
BUN/CREAT SERPL: 14 (ref 12–20)
CALCIUM SERPL-MCNC: 9.5 MG/DL (ref 8.5–10.1)
CANNABINOIDS UR QL SCN: NEGATIVE
CHLORIDE SERPL-SCNC: 108 MMOL/L (ref 100–111)
CHOLEST SERPL-MCNC: 245 MG/DL
CO2 SERPL-SCNC: 31 MMOL/L (ref 21–32)
COCAINE UR QL SCN: NEGATIVE
COLOR UR: NORMAL
COLOR UR: YELLOW
CREAT SERPL-MCNC: 0.79 MG/DL (ref 0.6–1.3)
CREAT UR-MCNC: 81 MG/DL (ref 30–125)
DIFFERENTIAL METHOD BLD: NORMAL
EOSINOPHIL # BLD: 0.4 K/UL (ref 0–0.4)
EOSINOPHIL NFR BLD: 5 % (ref 0–5)
EPITH CASTS URNS QL MICRO: NORMAL /LPF
EPITH CASTS URNS QL MICRO: NORMAL /LPF (ref 0–5)
ERYTHROCYTE [DISTWIDTH] IN BLOOD BY AUTOMATED COUNT: 13.7 % (ref 11.6–14.5)
EST. AVERAGE GLUCOSE BLD GHB EST-MCNC: 272 MG/DL
GLOBULIN SER CALC-MCNC: 4 G/DL (ref 2–4)
GLUCOSE SERPL-MCNC: 132 MG/DL (ref 74–99)
GLUCOSE UR STRIP.AUTO-MCNC: NEGATIVE MG/DL
GLUCOSE UR STRIP.AUTO-MCNC: NORMAL MG/DL
HBA1C MFR BLD: 11.1 % (ref 4.2–5.6)
HCT VFR BLD AUTO: 39.4 % (ref 35–45)
HDLC SERPL-MCNC: 65 MG/DL (ref 40–60)
HDLC SERPL: 3.8 (ref 0–5)
HDSCOM,HDSCOM: NORMAL
HGB BLD-MCNC: 12.2 G/DL (ref 12–16)
HGB UR QL STRIP: NEGATIVE
HGB UR QL STRIP: NORMAL
IMM GRANULOCYTES # BLD AUTO: 0 K/UL (ref 0–0.04)
IMM GRANULOCYTES NFR BLD AUTO: 0 % (ref 0–0.5)
KETONES UR QL STRIP.AUTO: NEGATIVE MG/DL
KETONES UR QL STRIP.AUTO: NORMAL MG/DL
LDLC SERPL CALC-MCNC: 163.2 MG/DL (ref 0–100)
LEUKOCYTE ESTERASE UR QL STRIP.AUTO: ABNORMAL
LEUKOCYTE ESTERASE UR QL STRIP.AUTO: NORMAL
LIPID PROFILE,FLP: ABNORMAL
LYMPHOCYTES # BLD: 2.2 K/UL (ref 0.9–3.6)
LYMPHOCYTES NFR BLD: 30 % (ref 21–52)
MCH RBC QN AUTO: 25.5 PG (ref 24–34)
MCHC RBC AUTO-ENTMCNC: 31 G/DL (ref 31–37)
MCV RBC AUTO: 82.4 FL (ref 78–100)
METHADONE UR QL: NEGATIVE
MICROALBUMIN UR-MCNC: 2.2 MG/DL (ref 0–3)
MICROALBUMIN/CREAT UR-RTO: 27 MG/G (ref 0–30)
MONOCYTES # BLD: 0.5 K/UL (ref 0.05–1.2)
MONOCYTES NFR BLD: 7 % (ref 3–10)
NEUTS SEG # BLD: 4.2 K/UL (ref 1.8–8)
NEUTS SEG NFR BLD: 57 % (ref 40–73)
NITRITE UR QL STRIP.AUTO: NEGATIVE
NITRITE UR QL STRIP.AUTO: NORMAL
NRBC # BLD: 0 K/UL (ref 0–0.01)
NRBC BLD-RTO: 0 PER 100 WBC
OPIATES UR QL: NEGATIVE
PCP UR QL: NEGATIVE
PH UR STRIP: 6.5 (ref 5–8)
PH UR STRIP: NORMAL [PH]
PLATELET # BLD AUTO: 283 K/UL (ref 135–420)
PMV BLD AUTO: 10.6 FL (ref 9.2–11.8)
POTASSIUM SERPL-SCNC: 4 MMOL/L (ref 3.5–5.5)
PROT SERPL-MCNC: 7.8 G/DL (ref 6.4–8.2)
PROT UR STRIP-MCNC: NEGATIVE MG/DL
PROT UR STRIP-MCNC: NORMAL MG/DL
RBC # BLD AUTO: 4.78 M/UL (ref 4.2–5.3)
RBC #/AREA URNS HPF: NEGATIVE /HPF (ref 0–5)
RBC #/AREA URNS HPF: NORMAL /HPF
SODIUM SERPL-SCNC: 143 MMOL/L (ref 136–145)
SP GR UR REFRACTOMETRY: 1.01 (ref 1–1.03)
SP GR UR REFRACTOMETRY: NORMAL
SP GR UR REFRACTOMETRY: NORMAL
T4 FREE SERPL-MCNC: 1.1 NG/DL (ref 0.7–1.5)
TRIGL SERPL-MCNC: 84 MG/DL (ref ?–150)
TSH SERPL DL<=0.05 MIU/L-ACNC: 0.81 UIU/ML (ref 0.36–3.74)
UROBILINOGEN UR QL STRIP.AUTO: 0.2 EU/DL (ref 0.2–1)
UROBILINOGEN UR QL STRIP.AUTO: NORMAL EU/DL
VLDLC SERPL CALC-MCNC: 16.8 MG/DL
WBC # BLD AUTO: 7.3 K/UL (ref 4.6–13.2)
WBC URNS QL MICRO: NORMAL /HPF
WBC URNS QL MICRO: NORMAL /HPF (ref 0–4)

## 2023-01-18 PROCEDURE — 3078F DIAST BP <80 MM HG: CPT | Performed by: NURSE PRACTITIONER

## 2023-01-18 PROCEDURE — 90686 IIV4 VACC NO PRSV 0.5 ML IM: CPT | Performed by: NURSE PRACTITIONER

## 2023-01-18 PROCEDURE — 80053 COMPREHEN METABOLIC PANEL: CPT

## 2023-01-18 PROCEDURE — 3046F HEMOGLOBIN A1C LEVEL >9.0%: CPT | Performed by: NURSE PRACTITIONER

## 2023-01-18 PROCEDURE — 85025 COMPLETE CBC W/AUTO DIFF WBC: CPT

## 2023-01-18 PROCEDURE — 83036 HEMOGLOBIN GLYCOSYLATED A1C: CPT

## 2023-01-18 PROCEDURE — 82043 UR ALBUMIN QUANTITATIVE: CPT

## 2023-01-18 PROCEDURE — 84443 ASSAY THYROID STIM HORMONE: CPT

## 2023-01-18 PROCEDURE — 3074F SYST BP LT 130 MM HG: CPT | Performed by: NURSE PRACTITIONER

## 2023-01-18 PROCEDURE — 81001 URINALYSIS AUTO W/SCOPE: CPT

## 2023-01-18 PROCEDURE — 99205 OFFICE O/P NEW HI 60 MIN: CPT | Performed by: NURSE PRACTITIONER

## 2023-01-18 PROCEDURE — 80061 LIPID PANEL: CPT

## 2023-01-18 PROCEDURE — 84439 ASSAY OF FREE THYROXINE: CPT

## 2023-01-18 PROCEDURE — 80307 DRUG TEST PRSMV CHEM ANLYZR: CPT

## 2023-01-18 PROCEDURE — 86803 HEPATITIS C AB TEST: CPT

## 2023-01-18 PROCEDURE — 36415 COLL VENOUS BLD VENIPUNCTURE: CPT

## 2023-01-18 RX ORDER — ALBUTEROL SULFATE 90 UG/1
2 AEROSOL, METERED RESPIRATORY (INHALATION)
Qty: 8.5 G | Refills: 1 | Status: SHIPPED | OUTPATIENT
Start: 2023-01-18

## 2023-01-18 RX ORDER — ATORVASTATIN CALCIUM 80 MG/1
80 TABLET, FILM COATED ORAL
Qty: 90 TABLET | Refills: 0 | Status: SHIPPED | OUTPATIENT
Start: 2023-01-18

## 2023-01-18 RX ORDER — LISINOPRIL AND HYDROCHLOROTHIAZIDE 12.5; 2 MG/1; MG/1
1 TABLET ORAL 2 TIMES DAILY
Qty: 90 TABLET | Refills: 0 | Status: SHIPPED | OUTPATIENT
Start: 2023-01-18

## 2023-01-18 RX ORDER — GABAPENTIN 300 MG/1
CAPSULE ORAL
Qty: 90 CAPSULE | Refills: 2 | Status: SHIPPED | OUTPATIENT
Start: 2023-01-18

## 2023-01-18 RX ORDER — AMLODIPINE BESYLATE 2.5 MG/1
2.5 TABLET ORAL DAILY
Qty: 30 TABLET | Refills: 3 | Status: SHIPPED | OUTPATIENT
Start: 2023-01-18

## 2023-01-18 NOTE — PROGRESS NOTES
1. \"Have you been to the ER, urgent care clinic since your last visit? Hospitalized since your last visit? \" No    2. \"Have you seen or consulted any other health care providers outside of the [address] [place] [address] since your last visit? \" No     3. For patients aged 39-70: Has the patient had a colonoscopy / FIT/ Cologuard? No      If the patient is female:    4. For patients aged 41-77: Has the patient had a mammogram within the past 2 years? Yes - Care Gap present. Most recent result on file      5. For patients aged 21-65: Has the patient had a pap smear?  No
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
## 2023-01-18 NOTE — PATIENT INSTRUCTIONS
Vaccine Information Statement    Influenza (Flu) Vaccine (Inactivated or Recombinant): What You Need to Know    Many vaccine information statements are available in [place] and other languages. See www.immunize.org/vis. Hojas de información sobre vacunas están disponibles en español y en muchos otros idiomas. Visite www.immunize.org/vis. 1. Why get vaccinated? Influenza vaccine can prevent influenza (flu). Flu is a contagious disease that spreads around the [facility] every year, usually between October and May. Anyone can get the flu, but it is more dangerous for some people. Infants and young children, people 72 years and older, pregnant people, and people with certain health conditions or a weakened immune system are at greatest risk of flu complications. Pneumonia, bronchitis, sinus infections, and ear infections are examples of flu-related complications. If you have a medical condition, such as heart disease, cancer, or diabetes, flu can make it worse. Flu can cause fever and chills, sore throat, muscle aches, fatigue, cough, headache, and runny or stuffy nose. Some people may have vomiting and diarrhea, though this is more common in children than adults. In an average year, thousands of people in the [facility] die from flu, and many more are hospitalized. Flu vaccine prevents millions of illnesses and flu-related visits to the doctor each year. 2. Influenza vaccines     CDC recommends everyone 6 months and older get vaccinated every flu season. Children 6 months through 6years of age may need 2 doses during a single flu season. Everyone else needs only 1 dose each flu season. It takes about 2 weeks for protection to develop after vaccination. There are many flu viruses, and they are always changing. Each year a new flu vaccine is made to protect against the influenza viruses believed to be likely to cause disease in the upcoming flu season.  Even when the vaccine doesnt exactly match these viruses, it may still provide some protection. Influenza vaccine does not cause flu. Influenza vaccine may be given at the same time as other vaccines. 3. Talk with your health care provider    Tell your vaccination provider if the person getting the vaccine:  Has had an allergic reaction after a previous dose of influenza vaccine, or has any severe, life-threatening allergies   Has ever had Guillain-Barré Syndrome (also called GBS)    In some cases, your health care provider may decide to postpone influenza vaccination until a future visit. Influenza vaccine can be administered at any time during pregnancy. People who are or will be pregnant during influenza season should receive inactivated influenza vaccine. People with minor illnesses, such as a cold, may be vaccinated. People who are moderately or severely ill should usually wait until they recover before getting influenza vaccine. Your health care provider can give you more information. 4. Risks of a vaccine reaction    Soreness, redness, and swelling where the shot is given, fever, muscle aches, and headache can happen after influenza vaccination. There may be a very small increased risk of Guillain-Barré Syndrome (GBS) after inactivated influenza vaccine (the flu shot). Bonnie Lady children who get the flu shot along with pneumococcal vaccine (PCV13) and/or DTaP vaccine at the same time might be slightly more likely to have a seizure caused by fever. Tell your health care provider if a child who is getting flu vaccine has ever had a seizure. People sometimes faint after medical procedures, including vaccination. Tell your provider if you feel dizzy or have vision changes or ringing in the ears. As with any medicine, there is a very remote chance of a vaccine causing a severe allergic reaction, other serious injury, or death. 5. What if there is a serious problem?     An allergic reaction could occur after the vaccinated person leaves the clinic. If you see signs of a severe allergic reaction (hives, swelling of the face and throat, difficulty breathing, a fast heartbeat, dizziness, or weakness), call 9-1-1 and get the person to the nearest hospital.    For other signs that concern you, call your health care provider. Adverse reactions should be reported to the Vaccine Adverse Event Reporting System (VAERS). Your health care provider will usually file this report, or you can do it yourself. Visit the VAERS website at www.vaers. [facility].gov or call 4-697.435.9351. VAERS is only for reporting reactions, and VAERS staff members do not give medical advice. 6. The National Vaccine Injury Compensation Program    The [facility] Vaccine Injury Compensation Program (VICP) is a federal program that was created to compensate people who may have been injured by certain vaccines. Claims regarding alleged injury or death due to vaccination have a time limit for filing, which may be as short as two years. Visit the VICP website at www.[facility].gov/vaccinecompensation or call 7-416.538.6794 to learn about the program and about filing a claim. 7. How can I learn more? Ask your health care provider. Call your local or state health department. Visit the website of the Food and Drug Administration (FDA) for vaccine package inserts and additional information at www.fda.gov/vaccines-blood-biologics/vaccines. Contact the Centers for Disease Control and Prevention (CDC): Call 5-195.222.7944 (1-800-CDC-INFO) or  Visit CDCs influenza website at www.cdc.gov/flu. Vaccine Information Statement   Inactivated Influenza Vaccine   8/6/2021  42 . Juan Carlos Him [id number]     Department of Health and Human Services  Centers for Disease Control and Prevention    Office Use Only    Vaccine Information Statement    Pneumococcal Conjugate Vaccine: What You Need to Know    Many vaccine information statements are available in Surinamese and other languages.  See www.immunize.org/vis. Hojas de información sobre vacunas están disponibles en español y en muchos otros idiomas. Visite www.immunize.org/vis. 1. Why get vaccinated? Pneumococcal conjugate vaccine can prevent pneumococcal disease. Pneumococcal disease refers to any illness caused by pneumococcal bacteria. These bacteria can cause many types of illnesses, including pneumonia, which is an infection of the lungs. Pneumococcal bacteria are one of the most common causes of pneumonia. Besides pneumonia, pneumococcal bacteria can also cause:  Ear infections  Sinus infections  Meningitis (infection of the tissue covering the brain and spinal cord)  Bacteremia (infection of the blood)    Anyone can get pneumococcal disease, but children under 3years old, people with certain medical conditions or other risk factors, and adults 72 years or older are at the highest risk. Most pneumococcal infections are mild. However, some can result in long-term problems, such as brain damage or hearing loss. Meningitis, bacteremia, and pneumonia caused by pneumococcal disease can be fatal.     2. Pneumococcal conjugate vaccine     Pneumococcal conjugate vaccine helps protect against bacteria that cause pneumococcal disease. There are three pneumococcal conjugate vaccines (PCV13, PCV15, and PCV20). The different vaccines are recommended for different people based on their age and medical status. PCV13  Infants and young children usually need 4 doses of PCV13, at ages 3, 3, 10, and 12-15 months. Older children (through age 62 months) may be vaccinated with PCV13 if they did not receive the recommended doses.   Children and adolescents 7-21 years of age with certain medical conditions should receive a single dose of PCV13 if they did not already receive PCV13.    PCV15 or PCV20  Adults 23 through 59years old with certain medical conditions or other risk factors who have not already received a pneumococcal conjugate vaccine should receive either:  a single dose of PCV15 followed by a dose of pneumococcal polysaccharide vaccine (PPSV23), or   a single dose of PCV20. Adults 72 years or older who have not already received a pneumococcal conjugate vaccine should receive either:  a single dose of PCV15 followed by a dose of PPSV23, or   a single dose of PCV20. Your health care provider can give you more information. 3. Talk with your health care provider    Tell your vaccination provider if the person getting the vaccine:  Has had an allergic reaction after a previous dose of any type of pneumococcal conjugate vaccine (PCV13, PCV15, PCV20, or an earlier pneumococcal conjugate vaccine known as PCV7), or to any vaccine containing diphtheria toxoid (for example, DTaP), or has any severe, life-threatening allergies    In some cases, your health care provider may decide to postpone pneumococcal conjugate vaccination until a future visit. People with minor illnesses, such as a cold, may be vaccinated. People who are moderately or severely ill should usually wait until they recover. Your health care provider can give you more information. 4. Risks of a vaccine reaction    Redness, swelling, pain, or tenderness where the shot is given, and fever, loss of appetite, fussiness (irritability), feeling tired, headache, muscle aches, joint pain, and chills can happen after pneumococcal conjugate vaccination. Martin Henson children may be at increased risk for seizures caused by fever after PCV13 if it is administered at the same time as inactivated influenza vaccine. Ask your health care provider for more information. People sometimes faint after medical procedures, including vaccination. Tell your provider if you feel dizzy or have vision changes or ringing in the ears. As with any medicine, there is a very remote chance of a vaccine causing a severe allergic reaction, other serious injury, or death.     5. What if there is a serious problem? An allergic reaction could occur after the vaccinated person leaves the clinic. If you see signs of a severe allergic reaction (hives, swelling of the face and throat, difficulty breathing, a fast heartbeat, dizziness, or weakness), call 9-1-1 and get the person to the nearest hospital.    For other signs that concern you, call your health care provider. Adverse reactions should be reported to the Vaccine Adverse Event Reporting System (VAERS). Your health care provider will usually file this report, or you can do it yourself. Visit the VAERS website at www.vaers. [facility].gov or call 6-536.608.6791. VAERS is only for reporting reactions, and VAERS staff members do not give medical advice. 6. The National Vaccine Injury Compensation Program    The [facility] Vaccine Injury Compensation Program (VICP) is a federal program that was created to compensate people who may have been injured by certain vaccines. Claims regarding alleged injury or death due to vaccination have a time limit for filing, which may be as short as two years. Visit the VICP website at www.[facility].gov/vaccinecompensation or call 6-999.385.9865 to learn about the program and about filing a claim. 7. How can I learn more? Ask your health care provider. Call your local or state health department. Visit the website of the Food and Drug Administration (FDA) for vaccine package inserts and additional information at www.fda.gov/vaccines-blood-biologics/vaccines. Contact the Centers for Disease Control and Prevention (CDC): Call 3-532.513.8754 (1-800-CDC-INFO) or  Visit CDCs website at www.cdc.gov/vaccines. Vaccine Information Statement (Interim)  Pneumococcal Conjugate Vaccine  2/4/2022  42 [facility] [id number]     Department of Health and Human Services  Centers for Disease Control and PreventionVaccine Information Statement    Recombinant Zoster (Shingles) Vaccine: What You Need to Know    Many Vaccine Information Statements are available in [place] and other languages. See www.immunize.org/vis  Hojas de información sobre vacunas están disponibles en español y en muchos otros idiomas. Visite www.immunize.org/vis    1. Why get vaccinated? Recombinant zoster (shingles) vaccine can prevent shingles. Shingles (also called herpes zoster, or just zoster) is a painful skin rash, usually with blisters. In addition to the rash, shingles can cause fever, headache, chills, or upset stomach. Rarely, shingles can lead to complications such as pneumonia, hearing problems, blindness, brain inflammation (encephalitis), or death. The risk of shingles increases with age. The most common complication of shingles is long-term nerve pain called postherpetic neuralgia (PHN). PHN occurs in the areas where the shingles rash was and can last for months or years after the rash goes away. The pain from PHN can be severe and debilitating. The risk of PHN increases with age. An older adult with shingles is more likely to develop PHN and have longer lasting and more severe pain than a younger person. People with weakened immune systems also have a higher risk of getting shingles and complications from the disease. Shingles is caused by varicella-zoster virus, the same virus that causes chickenpox. After you have chickenpox, the virus stays in your body and can cause shingles later in life. Shingles cannot be passed from one person to another, but the virus that causes shingles can spread and cause chickenpox in someone who has never had chickenpox or has never received chickenpox vaccine. 2. Recombinant shingles vaccine    Recombinant shingles vaccine provides strong protection against shingles. By preventing shingles, recombinant shingles vaccine also protects against PHN and other complications.      Recombinant shingles vaccine is recommended for:  Adults 48 years and older  Adults 19 years and older who have a weakened immune system because of disease or treatments    Shingles vaccine is given as a two-dose series. For most people, the second dose should be given 2 to 6 months after the first dose. Some people who have or will have a weakened immune system can get the second dose 1 to 2 months after the first dose. Ask your health care provider for guidance. People who have had shingles in the past and people who have received varicella (chickenpox) vaccine are recommended to get recombinant shingles vaccine. The vaccine is also recommended for people who have already gotten another type of shingles vaccine, the live shingles vaccine. There is no live virus in recombinant shingles vaccine. Shingles vaccine may be given at the same time as other vaccines. 3. Talk with your health care provider    Tell your vaccination provider if the person getting the vaccine:  Has had an allergic reaction after a previous dose of recombinant shingles vaccine, or has any severe, life-threatening allergies   Is currently experiencing an episode of shingles  Is pregnant     In some cases, your health care provider may decide to postpone shingles vaccination until a future visit. People with minor illnesses, such as a cold, may be vaccinated. People who are moderately or severely ill should usually wait until they recover before getting recombinant shingles vaccine. Your health care provider can give you more information. 4. Risks of a vaccine reaction    A sore arm with mild or moderate pain is very common after recombinant shingles vaccine. Redness and swelling can also happen at the site of the injection. Tiredness, muscle pain, headache, shivering, fever, stomach pain, and nausea are common after recombinant shingles vaccine. These side effects may temporarily prevent a vaccinated person from doing regular activities. Symptoms usually go away on their own in 2 to 3 days.  You should still get the second dose of recombinant shingles vaccine even if you had one of these reactions after the first dose. Guillain-Barré syndrome (GBS), a serious nervous system disorder, has been reported very rarely after recombinant zoster vaccine. People sometimes faint after medical procedures, including vaccination. Tell your provider if you feel dizzy or have vision changes or ringing in the ears. As with any medicine, there is a very remote chance of a vaccine causing a severe allergic reaction, other serious injury, or death. 5. What if there is a serious problem? An allergic reaction could occur after the vaccinated person leaves the clinic. If you see signs of a severe allergic reaction (hives, swelling of the face and throat, difficulty breathing, a fast heartbeat, dizziness, or weakness), call 9-1-1 and get the person to the nearest hospital.    For other signs that concern you, call your health care provider. Adverse reactions should be reported to the Vaccine Adverse Event Reporting System (VAERS). Your health care provider will usually file this report, or you can do it yourself. Visit the VAERS website at www.vaers. [facility].gov or call 4-209.382.1009. VAERS is only for reporting reactions, and VAERS staff members do not give medical advice. 6. How can I learn more? Ask your health care provider. Call your local or state health department. Visit the website of the Food and Drug Administration (FDA) for vaccine package inserts and additional information at www.fda.gov/vaccines-blood-biologics/vaccines. Contact the Centers for Disease Control and Prevention (CDC): Call 9-304.614.6272 (1-800-CDC-INFO) or  Visit CDCs website at www.cdc.gov/vaccines.     Vaccine Information Statement   Recombinant Zoster Vaccine   2/4/2022    Department of Health and Human Services  Centers for Disease Control and Prevention    Office Use Only

## 2023-01-19 LAB
HCV AB SER IA-ACNC: 0.03 INDEX
HCV AB SERPL QL IA: NEGATIVE
HCV COMMENT,HCGAC: NORMAL

## 2023-01-24 DIAGNOSIS — E78.5 HYPERLIPIDEMIA ASSOCIATED WITH TYPE 2 DIABETES MELLITUS (HCC): ICD-10-CM

## 2023-01-24 DIAGNOSIS — E11.65 TYPE 2 DIABETES MELLITUS WITH HYPERGLYCEMIA, WITH LONG-TERM CURRENT USE OF INSULIN (HCC): Primary | ICD-10-CM

## 2023-01-24 DIAGNOSIS — Z79.4 TYPE 2 DIABETES MELLITUS WITH HYPERGLYCEMIA, WITH LONG-TERM CURRENT USE OF INSULIN (HCC): Primary | ICD-10-CM

## 2023-01-24 DIAGNOSIS — E11.69 HYPERLIPIDEMIA ASSOCIATED WITH TYPE 2 DIABETES MELLITUS (HCC): ICD-10-CM

## 2023-01-24 RX ORDER — EZETIMIBE 10 MG/1
10 TABLET ORAL DAILY
Qty: 90 TABLET | Refills: 1 | Status: SHIPPED | OUTPATIENT
Start: 2023-01-24

## 2023-02-21 ENCOUNTER — TELEPHONE (OUTPATIENT)
Age: 53
End: 2023-02-21

## 2023-02-23 ENCOUNTER — CLINICAL DOCUMENTATION (OUTPATIENT)
Age: 53
End: 2023-02-23

## 2023-03-17 NOTE — TELEPHONE ENCOUNTER
Last Visit: 01- OV   Next Appointment: 04-  Previous Refill Encounter: 03- #20mL with 4 refills      Requested Prescriptions     Pending Prescriptions Disp Refills    insulin detemir (LEVEMIR) 100 UNIT/ML injection vial 20 mL 4     Sig: ADMINISTER 40 UNITS UNDER THE SKIN TWICE DAILY

## 2023-03-17 NOTE — TELEPHONE ENCOUNTER
----- Message from [facility] sent at 3/16/2023 10:28 AM EDT -----  Subject: Message to Provider    QUESTIONS  Information for Provider? Pt needs to get her Levemir sensor but she   doesn't have the 14 box. Please call asap  ---------------------------------------------------------------------------  --------------  Lina PORTILLO  [phone]; OK to leave message on voicemail  ---------------------------------------------------------------------------  --------------  SCRIPT ANSWERS  Relationship to Patient?  Self

## 2023-04-17 RX ORDER — ATORVASTATIN CALCIUM 80 MG/1
TABLET, FILM COATED ORAL
Qty: 90 TABLET | Refills: 1 | Status: SHIPPED | OUTPATIENT
Start: 2023-04-17

## 2023-04-17 RX ORDER — LISINOPRIL AND HYDROCHLOROTHIAZIDE 20; 12.5 MG/1; MG/1
TABLET ORAL
Qty: 90 TABLET | Refills: 1 | Status: SHIPPED | OUTPATIENT
Start: 2023-04-17

## 2023-04-17 RX ORDER — ALBUTEROL SULFATE 90 UG/1
AEROSOL, METERED RESPIRATORY (INHALATION)
Qty: 18 G | Refills: 1 | Status: SHIPPED | OUTPATIENT
Start: 2023-04-17

## 2023-04-17 NOTE — TELEPHONE ENCOUNTER
Last Visit: 01- OV   Next Appointment: 04-  Previous Refill Encounter: lisinopril-HCTZ on  01- #90tabs with 0 refills  Albuterol on 01-19=8-2023 # 8.5g with 1 refill  Atorvastatin on 01- # 90 tbas with 0 refills

## 2023-04-26 NOTE — TELEPHONE ENCOUNTER
----- Message from April Nagle sent at 4/24/2023 10:52 AM EDT -----  Subject: Refill Request    QUESTIONS  Name of Medication? insulin detemir (LEVEMIR) 100 UNIT/ML injection vial  Patient-reported dosage and instructions? ADMINISTER 40 UNITS UNDER THE   SKIN TWICE DAILY  How many days do you have left? 0  [id number]  Pharmacy phone number (if available)? [phone]  ---------------------------------------------------------------------------  --------------,  Name of Medication? Other - insulin needles  Patient-reported dosage and instructions? as directed   How many days do you have left? 0  [facility] [facility] [address] [id number]  Pharmacy phone number (if available)? [phone]  ---------------------------------------------------------------------------  --------------,  Name of Medication? cyclobenzaprine (FLEXERIL) 10 MG tablet  Patient-reported dosage and instructions? Take 10 mg by mouth at bedtime   How many days do you have left? 0  [facility] [facility] [address] [id number]  Pharmacy phone number (if available)? [phone]  ---------------------------------------------------------------------------  --------------  CALL BACK INFO  What is the best way for the office to contact you? OK to leave message on   voicemail  Preferred Call Back Phone Number? [phone]  ---------------------------------------------------------------------------  --------------  SCRIPT ANSWERS  Relationship to Patient?  Self

## 2023-04-28 RX ORDER — CYCLOBENZAPRINE HCL 10 MG
10 TABLET ORAL NIGHTLY PRN
Qty: 30 TABLET | Refills: 0 | Status: SHIPPED | OUTPATIENT
Start: 2023-04-28

## 2023-05-15 RX ORDER — ALBUTEROL SULFATE 90 UG/1
AEROSOL, METERED RESPIRATORY (INHALATION)
Qty: 18 G | Refills: 1 | OUTPATIENT
Start: 2023-05-15

## 2023-05-26 ENCOUNTER — TELEPHONE (OUTPATIENT)
Age: 53
End: 2023-05-26

## 2023-05-26 NOTE — TELEPHONE ENCOUNTER
Patient transferred from nurse triage with complaints of leg swelling. Once patient was on the line she says she is having muscle spasms which tend to be worst at night.

## 2023-05-26 NOTE — TELEPHONE ENCOUNTER
Spoke with the patient today. States she is experiencing bilateral leg swelling and muscle spasms. Left leg is worse than left. Denies any warmth or redness to area. Patient comments pain takes breath away. Has some bulging in calf. States feet are pointing inward and toes move by themselves. Advised patient she will need to go to an emergency room for evaluation. She verbalized understanding.

## 2023-05-30 RX ORDER — CYCLOBENZAPRINE HCL 10 MG
10 TABLET ORAL NIGHTLY PRN
Qty: 30 TABLET | Refills: 2 | Status: SHIPPED | OUTPATIENT
Start: 2023-05-30

## 2023-06-08 DIAGNOSIS — Z79.4 TYPE 2 DIABETES MELLITUS WITH DIABETIC NEUROPATHY, WITH LONG-TERM CURRENT USE OF INSULIN (HCC): Primary | ICD-10-CM

## 2023-06-08 DIAGNOSIS — E11.40 TYPE 2 DIABETES MELLITUS WITH DIABETIC NEUROPATHY, WITH LONG-TERM CURRENT USE OF INSULIN (HCC): Primary | ICD-10-CM

## 2023-06-08 RX ORDER — FLASH GLUCOSE SCANNING READER
1 EACH MISCELLANEOUS
COMMUNITY
Start: 2022-03-08

## 2023-06-08 RX ORDER — FLASH GLUCOSE SENSOR
1 KIT MISCELLANEOUS
Qty: 2 EACH | Refills: 5 | Status: SHIPPED | OUTPATIENT
Start: 2023-06-08

## 2023-06-08 RX ORDER — FLASH GLUCOSE SCANNING READER
1 EACH MISCELLANEOUS
OUTPATIENT
Start: 2023-06-08

## 2023-06-08 RX ORDER — FLASH GLUCOSE SENSOR
KIT MISCELLANEOUS
COMMUNITY
Start: 2022-03-08 | End: 2023-06-08 | Stop reason: SDUPTHER

## 2023-06-08 NOTE — TELEPHONE ENCOUNTER
----- Message from Felton Neville sent at 6/8/2023 11:59 AM EDT -----  Subject: Refill Request    QUESTIONS  Name of Medication? Other - FreeStyle [place] Readers  Patient-reported dosage and instructions? N/A  How many days do you have left? 0  [id number]  Pharmacy phone number (if available)? [phone]  Additional Information for Provider? Pt states that she is completely out   of the readers for her [place]. Please note.   ---------------------------------------------------------------------------  --------------  CALL BACK INFO  What is the best way for the office to contact you? OK to leave message on   voicemail  Preferred Call Back Phone Number? [phone]  ---------------------------------------------------------------------------  --------------  SCRIPT ANSWERS  Relationship to Patient?  Self

## 2023-07-13 NOTE — TELEPHONE ENCOUNTER
Last Visit: 01- OV   Next Appointment: none  Previous Refill Encounter: 04- #90 tabs with 1 refills      Requested Prescriptions     Pending Prescriptions Disp Refills    lisinopril-hydroCHLOROthiazide (PRINZIDE;ZESTORETIC) 20-12.5 MG per tablet [Pharmacy Med Name: LISINOPRIL-HCTZ 20/12.5MG TABLETS] 90 tablet 1     Sig: TAKE 1 TABLET BY MOUTH TWICE DAILY

## 2023-07-16 DIAGNOSIS — E11.40 TYPE 2 DIABETES MELLITUS WITH DIABETIC NEUROPATHY, WITH LONG-TERM CURRENT USE OF INSULIN (HCC): Primary | ICD-10-CM

## 2023-07-16 DIAGNOSIS — Z79.4 TYPE 2 DIABETES MELLITUS WITH DIABETIC NEUROPATHY, WITH LONG-TERM CURRENT USE OF INSULIN (HCC): Primary | ICD-10-CM

## 2023-07-17 NOTE — TELEPHONE ENCOUNTER
Last Visit: 01- OV   Next Appointment: none  Previous Refill Encounter: 01- #90 caps with 2 refills      Requested Prescriptions     Pending Prescriptions Disp Refills    gabapentin (NEURONTIN) 300 MG capsule [Pharmacy Med Name: GABAPENTIN 300MG CAPSULES] 90 capsule      Sig: TAKE 1 CAPSULE BY MOUTH THREE TIMES DAILY. MAX OF 3 CAPSULES DAILY.

## 2023-07-24 RX ORDER — GABAPENTIN 300 MG/1
CAPSULE ORAL
Qty: 90 CAPSULE | Refills: 0 | Status: SHIPPED | OUTPATIENT
Start: 2023-07-24 | End: 2023-08-23

## 2023-07-24 RX ORDER — LISINOPRIL AND HYDROCHLOROTHIAZIDE 20; 12.5 MG/1; MG/1
TABLET ORAL
Qty: 30 TABLET | Refills: 0 | Status: SHIPPED | OUTPATIENT
Start: 2023-07-24 | End: 2023-09-11

## 2023-07-24 NOTE — TELEPHONE ENCOUNTER
Last Visit: 01- OV   Next Appointment: none  Previous Refill Encounter: 01- #90 tabs with 1 refills      Requested Prescriptions     Pending Prescriptions Disp Refills    ezetimibe (ZETIA) 10 MG tablet [Pharmacy Med Name: EZETIMIBE 10MG TABLETS] 90 tablet      Sig: TAKE 1 TABLET BY MOUTH DAILY FOR HIGH CHOLESTEROL

## 2023-07-25 RX ORDER — EZETIMIBE 10 MG/1
TABLET ORAL
Qty: 90 TABLET | Refills: 1 | Status: SHIPPED | OUTPATIENT
Start: 2023-07-25

## 2023-08-18 NOTE — TELEPHONE ENCOUNTER
Last Visit: 01- OV   Next Appointment: none  Previous Refill Encounter: 01- #30 tabs with 3 refills      Requested Prescriptions     Pending Prescriptions Disp Refills    amLODIPine (NORVASC) 2.5 MG tablet [Pharmacy Med Name: AMLODIPINE BESYLATE 2.5MG TABLETS] 30 tablet 3     Sig: TAKE 1 TABLET BY MOUTH DAILY

## 2023-08-19 RX ORDER — AMLODIPINE BESYLATE 2.5 MG/1
TABLET ORAL
Qty: 30 TABLET | Refills: 3 | Status: SHIPPED | OUTPATIENT
Start: 2023-08-19

## 2023-08-21 ENCOUNTER — HOSPITAL ENCOUNTER (EMERGENCY)
Facility: HOSPITAL | Age: 53
Discharge: HOME | End: 2023-08-21
Attending: STUDENT IN AN ORGANIZED HEALTH CARE EDUCATION/TRAINING PROGRAM

## 2023-08-21 VITALS
OXYGEN SATURATION: 97 % | RESPIRATION RATE: 16 BRPM | DIASTOLIC BLOOD PRESSURE: 77 MMHG | HEIGHT: 65 IN | HEART RATE: 88 BPM | BODY MASS INDEX: 29.32 KG/M2 | WEIGHT: 176 LBS | TEMPERATURE: 98.3 F | SYSTOLIC BLOOD PRESSURE: 120 MMHG

## 2023-08-21 DIAGNOSIS — J02.9 ACUTE PHARYNGITIS, UNSPECIFIED ETIOLOGY: Primary | ICD-10-CM

## 2023-08-21 LAB
DEPRECATED S PYO AG THROAT QL EIA: NEGATIVE
FLUAV RNA SPEC QL NAA+PROBE: NOT DETECTED
FLUBV RNA SPEC QL NAA+PROBE: NOT DETECTED
HETEROPH AB SER QL: NEGATIVE
SARS-COV-2 RNA RESP QL NAA+PROBE: NOT DETECTED

## 2023-08-21 PROCEDURE — 87636 SARSCOV2 & INF A&B AMP PRB: CPT

## 2023-08-21 PROCEDURE — 87070 CULTURE OTHR SPECIMN AEROBIC: CPT

## 2023-08-21 PROCEDURE — 6370000000 HC RX 637 (ALT 250 FOR IP): Performed by: STUDENT IN AN ORGANIZED HEALTH CARE EDUCATION/TRAINING PROGRAM

## 2023-08-21 PROCEDURE — 99283 EMERGENCY DEPT VISIT LOW MDM: CPT

## 2023-08-21 PROCEDURE — 86308 HETEROPHILE ANTIBODY SCREEN: CPT

## 2023-08-21 PROCEDURE — 87880 STREP A ASSAY W/OPTIC: CPT

## 2023-08-21 PROCEDURE — 6360000002 HC RX W HCPCS: Performed by: STUDENT IN AN ORGANIZED HEALTH CARE EDUCATION/TRAINING PROGRAM

## 2023-08-21 RX ORDER — IBUPROFEN 600 MG/1
600 TABLET ORAL
Status: COMPLETED | OUTPATIENT
Start: 2023-08-21 | End: 2023-08-21

## 2023-08-21 RX ORDER — DEXAMETHASONE 4 MG/1
6 TABLET ORAL ONCE
Status: COMPLETED | OUTPATIENT
Start: 2023-08-21 | End: 2023-08-21

## 2023-08-21 RX ADMIN — IBUPROFEN 600 MG: 600 TABLET, FILM COATED ORAL at 07:37

## 2023-08-21 RX ADMIN — DEXAMETHASONE 6 MG: 4 TABLET ORAL at 07:37

## 2023-08-21 ASSESSMENT — ENCOUNTER SYMPTOMS
NAUSEA: 0
ABDOMINAL PAIN: 0
DIARRHEA: 0
SHORTNESS OF BREATH: 0
TROUBLE SWALLOWING: 1
CONSTIPATION: 0
BACK PAIN: 0

## 2023-08-21 NOTE — H&P
EMERGENCY DEPARTMENT HISTORY AND PHYSICAL EXAM    3:46 PM      Date: 8/21/2023  Patient Name: Luis Miguel Cleveland    History of Presenting Illness     Chief Complaint   Patient presents with    Pharyngitis       History From: Patient  HPI  53-year-old female with history of what seems to be hypertension, hypothyroidism who presents with throat pain. Patient states that symptoms have been going on for 3 days. States that she feels like something is in the back of her throat, having pain with swallowing. Denies any toxic ingestion, sick contacts, or any other precipitating factors. When assessing ROS she endorses cough, however no fevers, chest pain, abdominal pain, acute respiratory distress, or any other changes. Nursing Notes were all reviewed and agreed with or any disagreements were addressed in the HPI. PCP: HERIBERTO Houser NP    No current facility-administered medications for this encounter. Current Outpatient Medications   Medication Sig Dispense Refill    amLODIPine (NORVASC) 2.5 MG tablet TAKE 1 TABLET BY MOUTH DAILY 30 tablet 3    ezetimibe (ZETIA) 10 MG tablet TAKE 1 TABLET BY MOUTH DAILY FOR HIGH CHOLESTEROL 90 tablet 1    lisinopril-hydroCHLOROthiazide (PRINZIDE;ZESTORETIC) 20-12.5 MG per tablet TAKE 1 TABLET BY MOUTH TWICE DAILY 30 tablet 0    gabapentin (NEURONTIN) 300 MG capsule TAKE 1 CAPSULE BY MOUTH THREE TIMES DAILY. MAX OF 3 CAPSULES DAILY.  90 capsule 0    Continuous Blood Gluc  (FREESTYLE OLGA LIDIA 14 DAY READER) MAHNAZ 1 each by Other route 4 times daily (before meals and nightly)      Continuous Blood Gluc Sensor (FREESTYLE OLGA LIDIA 14 DAY SENSOR) MISC 1 each by Does not apply route every 14 days 2 each 5    cyclobenzaprine (FLEXERIL) 10 MG tablet Take 1 tablet by mouth nightly as needed for Muscle spasms 30 tablet 2    insulin detemir (LEVEMIR) 100 UNIT/ML injection vial ADMINISTER 40 UNITS UNDER THE SKIN TWICE DAILY 20 mL 4    Insulin Pen Needle 31G X 6 MM MISC 1 each by

## 2023-08-21 NOTE — ED NOTES
Received patient in triage with c/o sore throat x 3 days. Denies any fevers. Pt stated that it feels like something is stuck in the back of her throat. Pt also reports being out of Synthroid x 1 week.       Mary Muñoz RN  08/21/23 8920

## 2023-08-21 NOTE — DISCHARGE INSTRUCTIONS
You were evaluated for sore throat . Based on your work-up it was deemed that she was stable for discharge. Please pick take Motrin as needed to help with your sore throat. Please follow-up with your primary care physician if you have any further concerns and go over your work-up. If you experience any chest pain, shortness of breath, worsening abdominal pain, vomiting blood, worsening headache, seizures, or any worsening of your symptoms please return to the emergency department immediately. If you have any pending results or any further questions please contact the emergency department at [phone].

## 2023-08-23 LAB
BACTERIA SPEC CULT: NORMAL
SERVICE CMNT-IMP: NORMAL

## 2023-09-05 NOTE — TELEPHONE ENCOUNTER
Last appointment 1/18/2023  No Show 5/22/2023, 6/7/2023, 6/21/2023  Next appointment not scheduled     Last written 7/24/2023 30 0 refill

## 2023-09-11 RX ORDER — LISINOPRIL AND HYDROCHLOROTHIAZIDE 20; 12.5 MG/1; MG/1
TABLET ORAL
Qty: 30 TABLET | Refills: 0 | Status: SHIPPED | OUTPATIENT
Start: 2023-09-11

## 2023-09-22 DIAGNOSIS — E11.40 TYPE 2 DIABETES MELLITUS WITH DIABETIC NEUROPATHY, WITH LONG-TERM CURRENT USE OF INSULIN (HCC): ICD-10-CM

## 2023-09-22 DIAGNOSIS — Z79.4 TYPE 2 DIABETES MELLITUS WITH DIABETIC NEUROPATHY, WITH LONG-TERM CURRENT USE OF INSULIN (HCC): ICD-10-CM

## 2023-09-22 RX ORDER — GABAPENTIN 300 MG/1
CAPSULE ORAL
Qty: 90 CAPSULE | OUTPATIENT
Start: 2023-09-22

## 2023-10-04 DIAGNOSIS — Z79.4 TYPE 2 DIABETES MELLITUS WITH DIABETIC NEUROPATHY, WITH LONG-TERM CURRENT USE OF INSULIN (HCC): ICD-10-CM

## 2023-10-04 DIAGNOSIS — E11.40 TYPE 2 DIABETES MELLITUS WITH DIABETIC NEUROPATHY, WITH LONG-TERM CURRENT USE OF INSULIN (HCC): ICD-10-CM

## 2023-10-04 RX ORDER — LISINOPRIL AND HYDROCHLOROTHIAZIDE 20; 12.5 MG/1; MG/1
TABLET ORAL
Qty: 30 TABLET | Refills: 0 | OUTPATIENT
Start: 2023-10-04

## 2023-10-04 RX ORDER — GABAPENTIN 300 MG/1
CAPSULE ORAL
Qty: 90 CAPSULE | OUTPATIENT
Start: 2023-10-04

## 2023-10-04 RX ORDER — ATORVASTATIN CALCIUM 80 MG/1
TABLET, FILM COATED ORAL
Qty: 90 TABLET | Refills: 1 | OUTPATIENT
Start: 2023-10-04

## 2023-10-05 RX ORDER — CYCLOBENZAPRINE HCL 10 MG
TABLET ORAL
Qty: 30 TABLET | Refills: 2 | OUTPATIENT
Start: 2023-10-05

## 2023-11-08 ENCOUNTER — HOSPITAL ENCOUNTER (EMERGENCY)
Facility: HOSPITAL | Age: 53
Discharge: HOME | End: 2023-11-08
Attending: EMERGENCY MEDICINE
Payer: MEDICAID

## 2023-11-08 ENCOUNTER — APPOINTMENT (OUTPATIENT)
Facility: HOSPITAL | Age: 53
End: 2023-11-08
Payer: MEDICAID

## 2023-11-08 VITALS
BODY MASS INDEX: 29.25 KG/M2 | DIASTOLIC BLOOD PRESSURE: 91 MMHG | SYSTOLIC BLOOD PRESSURE: 164 MMHG | RESPIRATION RATE: 15 BRPM | WEIGHT: 182 LBS | HEIGHT: 66 IN | HEART RATE: 90 BPM | TEMPERATURE: 98.3 F | OXYGEN SATURATION: 97 %

## 2023-11-08 DIAGNOSIS — J40 BRONCHITIS: Primary | ICD-10-CM

## 2023-11-08 DIAGNOSIS — E08.65 DIABETES MELLITUS DUE TO UNDERLYING CONDITION WITH HYPERGLYCEMIA, WITH LONG-TERM CURRENT USE OF INSULIN (HCC): ICD-10-CM

## 2023-11-08 DIAGNOSIS — Z79.4 DIABETES MELLITUS DUE TO UNDERLYING CONDITION WITH HYPERGLYCEMIA, WITH LONG-TERM CURRENT USE OF INSULIN (HCC): ICD-10-CM

## 2023-11-08 LAB
ALBUMIN SERPL-MCNC: 3.7 G/DL (ref 3.4–5)
ALBUMIN/GLOB SERPL: 0.8 (ref 0.8–1.7)
ALP SERPL-CCNC: 139 U/L (ref 45–117)
ALT SERPL-CCNC: 33 U/L (ref 13–56)
ANION GAP SERPL CALC-SCNC: 3 MMOL/L (ref 3–18)
AST SERPL-CCNC: 33 U/L (ref 10–38)
BASOPHILS # BLD: 0 K/UL (ref 0–0.1)
BASOPHILS NFR BLD: 0 % (ref 0–2)
BILIRUB SERPL-MCNC: 0.6 MG/DL (ref 0.2–1)
BUN SERPL-MCNC: 18 MG/DL (ref 7–18)
BUN/CREAT SERPL: 14 (ref 12–20)
CALCIUM SERPL-MCNC: 9 MG/DL (ref 8.5–10.1)
CHLORIDE SERPL-SCNC: 105 MMOL/L (ref 100–111)
CO2 SERPL-SCNC: 30 MMOL/L (ref 21–32)
CREAT SERPL-MCNC: 1.25 MG/DL (ref 0.6–1.3)
DIFFERENTIAL METHOD BLD: NORMAL
EKG ATRIAL RATE: 96 BPM
EKG DIAGNOSIS: NORMAL
EKG P AXIS: 49 DEGREES
EKG P-R INTERVAL: 138 MS
EKG Q-T INTERVAL: 380 MS
EKG QRS DURATION: 84 MS
EKG QTC CALCULATION (BAZETT): 480 MS
EKG R AXIS: -4 DEGREES
EKG T AXIS: 72 DEGREES
EKG VENTRICULAR RATE: 96 BPM
EOSINOPHIL # BLD: 0.3 K/UL (ref 0–0.4)
EOSINOPHIL NFR BLD: 4 % (ref 0–5)
ERYTHROCYTE [DISTWIDTH] IN BLOOD BY AUTOMATED COUNT: 12.9 % (ref 11.6–14.5)
GLOBULIN SER CALC-MCNC: 4.6 G/DL (ref 2–4)
GLUCOSE BLD STRIP.AUTO-MCNC: 275 MG/DL (ref 70–110)
GLUCOSE SERPL-MCNC: 427 MG/DL (ref 74–99)
HCT VFR BLD AUTO: 41.3 % (ref 35–45)
HGB BLD-MCNC: 13.1 G/DL (ref 12–16)
IMM GRANULOCYTES # BLD AUTO: 0 K/UL (ref 0–0.04)
IMM GRANULOCYTES NFR BLD AUTO: 0 % (ref 0–0.5)
LYMPHOCYTES # BLD: 2.6 K/UL (ref 0.9–3.6)
LYMPHOCYTES NFR BLD: 35 % (ref 21–52)
MCH RBC QN AUTO: 25.1 PG (ref 24–34)
MCHC RBC AUTO-ENTMCNC: 31.7 G/DL (ref 31–37)
MCV RBC AUTO: 79.1 FL (ref 78–100)
MONOCYTES # BLD: 0.5 K/UL (ref 0.05–1.2)
MONOCYTES NFR BLD: 7 % (ref 3–10)
NEUTS SEG # BLD: 3.9 K/UL (ref 1.8–8)
NEUTS SEG NFR BLD: 54 % (ref 40–73)
NRBC # BLD: 0 K/UL (ref 0–0.01)
NRBC BLD-RTO: 0 PER 100 WBC
PLATELET # BLD AUTO: 250 K/UL (ref 135–420)
PMV BLD AUTO: 11.3 FL (ref 9.2–11.8)
POTASSIUM SERPL-SCNC: 4.1 MMOL/L (ref 3.5–5.5)
PROT SERPL-MCNC: 8.3 G/DL (ref 6.4–8.2)
RBC # BLD AUTO: 5.22 M/UL (ref 4.2–5.3)
SODIUM SERPL-SCNC: 138 MMOL/L (ref 136–145)
TROPONIN I SERPL HS-MCNC: 9 NG/L (ref 0–54)
WBC # BLD AUTO: 7.2 K/UL (ref 4.6–13.2)

## 2023-11-08 PROCEDURE — 84484 ASSAY OF TROPONIN QUANT: CPT

## 2023-11-08 PROCEDURE — 71045 X-RAY EXAM CHEST 1 VIEW: CPT

## 2023-11-08 PROCEDURE — 93005 ELECTROCARDIOGRAM TRACING: CPT | Performed by: EMERGENCY MEDICINE

## 2023-11-08 PROCEDURE — 2580000003 HC RX 258: Performed by: EMERGENCY MEDICINE

## 2023-11-08 PROCEDURE — 85025 COMPLETE CBC W/AUTO DIFF WBC: CPT

## 2023-11-08 PROCEDURE — 93010 ELECTROCARDIOGRAM REPORT: CPT | Performed by: INTERNAL MEDICINE

## 2023-11-08 PROCEDURE — 99285 EMERGENCY DEPT VISIT HI MDM: CPT

## 2023-11-08 PROCEDURE — 96360 HYDRATION IV INFUSION INIT: CPT

## 2023-11-08 PROCEDURE — 6360000002 HC RX W HCPCS: Performed by: EMERGENCY MEDICINE

## 2023-11-08 PROCEDURE — 80053 COMPREHEN METABOLIC PANEL: CPT

## 2023-11-08 PROCEDURE — 82962 GLUCOSE BLOOD TEST: CPT

## 2023-11-08 RX ORDER — ALBUTEROL SULFATE 1.25 MG/3ML
2.5 SOLUTION RESPIRATORY (INHALATION)
Status: COMPLETED | OUTPATIENT
Start: 2023-11-08 | End: 2023-11-08

## 2023-11-08 RX ORDER — PREDNISONE 20 MG/1
40 TABLET ORAL DAILY
Qty: 8 TABLET | Refills: 0 | Status: SHIPPED | OUTPATIENT
Start: 2023-11-08 | End: 2023-11-12

## 2023-11-08 RX ORDER — 0.9 % SODIUM CHLORIDE 0.9 %
500 INTRAVENOUS SOLUTION INTRAVENOUS ONCE
Status: COMPLETED | OUTPATIENT
Start: 2023-11-08 | End: 2023-11-08

## 2023-11-08 RX ADMIN — SODIUM CHLORIDE 500 ML: 9 INJECTION, SOLUTION INTRAVENOUS at 16:52

## 2023-11-08 RX ADMIN — ALBUTEROL SULFATE 2.5 MG: 1.25 SOLUTION RESPIRATORY (INHALATION) at 16:52

## 2023-11-08 ASSESSMENT — PAIN SCALES - GENERAL: PAINLEVEL_OUTOF10: 5

## 2023-11-08 ASSESSMENT — PAIN - FUNCTIONAL ASSESSMENT: PAIN_FUNCTIONAL_ASSESSMENT: 0-10

## 2023-11-08 ASSESSMENT — LIFESTYLE VARIABLES
HOW OFTEN DO YOU HAVE A DRINK CONTAINING ALCOHOL: 2-3 TIMES A WEEK
HOW MANY STANDARD DRINKS CONTAINING ALCOHOL DO YOU HAVE ON A TYPICAL DAY: 1 OR 2

## 2023-11-08 NOTE — ED PROVIDER NOTES
EMERGENCY DEPARTMENT HISTORY AND PHYSICAL EXAM    4:30 PM EST seen at this time in room 2        Date: 11/8/2023  Patient Name: Joe Eubanks    History of Presenting Illness     Chief Complaint   Patient presents with    Chest Pain         History Provided By: patient    Additional History (Context): Joe Eubanks is a 52 y.o. female presents with insulin-dependent diabetes and asthma, is out of one of her injectable medications due to problems with primary care does an appointment coming up but is not till March. She has had about 2 weeks of cough and has developed chest pain when she coughs and back pain when she coughs. No exertional component to this not coughing up any blood no history of venous thromboembolic disease. Reminds her of when she had walking pneumonia. She did measure a fever of 104 Fahrenheit which she had for 1 day and then resolved. No vomiting or diarrhea. She is finding she is using her albuterol more often. PCP: HERIBERTO Johansen NP    Chief Complaint:   Duration:    Timing:    Location:   Quality:   Severity:   Modifying Factors:   Associated Symptoms:       No current facility-administered medications for this encounter. Current Outpatient Medications   Medication Sig Dispense Refill    insulin detemir (LEVEMIR) 100 UNIT/ML injection vial ADMINISTER 40 UNITS UNDER THE SKIN TWICE DAILY 20 mL 4    predniSONE (DELTASONE) 20 MG tablet Take 2 tablets by mouth daily for 4 days 8 tablet 0    lisinopril-hydroCHLOROthiazide (PRINZIDE;ZESTORETIC) 20-12.5 MG per tablet TAKE 1 TABLET BY MOUTH TWICE DAILY 30 tablet 0    amLODIPine (NORVASC) 2.5 MG tablet TAKE 1 TABLET BY MOUTH DAILY 30 tablet 3    ezetimibe (ZETIA) 10 MG tablet TAKE 1 TABLET BY MOUTH DAILY FOR HIGH CHOLESTEROL 90 tablet 1    gabapentin (NEURONTIN) 300 MG capsule TAKE 1 CAPSULE BY MOUTH THREE TIMES DAILY. MAX OF 3 CAPSULES DAILY.  90 capsule 0    Continuous Blood Gluc  (FREESTYLE OLGA LIDIA 14 DAY READER) MAHNAZ 1

## 2023-11-08 NOTE — ED NOTES
D/c papers given to patient. No further c/o. Stated \"I feel better\". D/c iv site. Pressure dressing applied by Shanice, hemostasis achieved. Pt dressed self and ambulated out of ER independently.       Juan Baig RN  11/08/23 1067

## 2023-11-08 NOTE — ED TRIAGE NOTES
Intermittent CP \"pressure\" and SOB x 1 week. States Hx of asthma and has been under [place] lot of stress\" but worse recently.

## 2023-11-20 ENCOUNTER — OFFICE VISIT (OUTPATIENT)
Age: 53
End: 2023-11-20
Payer: MEDICAID

## 2023-11-20 VITALS
HEART RATE: 92 BPM | BODY MASS INDEX: 28.88 KG/M2 | DIASTOLIC BLOOD PRESSURE: 94 MMHG | SYSTOLIC BLOOD PRESSURE: 165 MMHG | OXYGEN SATURATION: 94 % | HEIGHT: 67 IN | RESPIRATION RATE: 18 BRPM | WEIGHT: 184 LBS | TEMPERATURE: 97.8 F

## 2023-11-20 DIAGNOSIS — I10 PRIMARY HYPERTENSION: ICD-10-CM

## 2023-11-20 DIAGNOSIS — Z13.29 SCREENING FOR ENDOCRINE, METABOLIC AND IMMUNITY DISORDER: ICD-10-CM

## 2023-11-20 DIAGNOSIS — F41.9 ANXIETY: ICD-10-CM

## 2023-11-20 DIAGNOSIS — Z12.4 SCREENING FOR CERVICAL CANCER: ICD-10-CM

## 2023-11-20 DIAGNOSIS — Z13.0 SCREENING FOR ENDOCRINE, METABOLIC AND IMMUNITY DISORDER: ICD-10-CM

## 2023-11-20 DIAGNOSIS — Z13.228 SCREENING FOR ENDOCRINE, METABOLIC AND IMMUNITY DISORDER: ICD-10-CM

## 2023-11-20 DIAGNOSIS — Z12.31 ENCOUNTER FOR SCREENING MAMMOGRAM FOR MALIGNANT NEOPLASM OF BREAST: ICD-10-CM

## 2023-11-20 DIAGNOSIS — E03.9 HYPOTHYROIDISM, UNSPECIFIED TYPE: ICD-10-CM

## 2023-11-20 DIAGNOSIS — E11.69 TYPE 2 DIABETES MELLITUS WITH OTHER SPECIFIED COMPLICATION, WITH LONG-TERM CURRENT USE OF INSULIN (HCC): Primary | ICD-10-CM

## 2023-11-20 DIAGNOSIS — J45.20 MILD INTERMITTENT ASTHMA WITHOUT COMPLICATION: ICD-10-CM

## 2023-11-20 DIAGNOSIS — Z79.4 TYPE 2 DIABETES MELLITUS WITH OTHER SPECIFIED COMPLICATION, WITH LONG-TERM CURRENT USE OF INSULIN (HCC): ICD-10-CM

## 2023-11-20 DIAGNOSIS — E11.69 TYPE 2 DIABETES MELLITUS WITH OTHER SPECIFIED COMPLICATION, WITH LONG-TERM CURRENT USE OF INSULIN (HCC): ICD-10-CM

## 2023-11-20 DIAGNOSIS — Z79.4 TYPE 2 DIABETES MELLITUS WITH DIABETIC NEUROPATHY, WITH LONG-TERM CURRENT USE OF INSULIN (HCC): ICD-10-CM

## 2023-11-20 DIAGNOSIS — G62.9 NEUROPATHY: ICD-10-CM

## 2023-11-20 DIAGNOSIS — E78.5 HYPERLIPIDEMIA, UNSPECIFIED HYPERLIPIDEMIA TYPE: ICD-10-CM

## 2023-11-20 DIAGNOSIS — Z12.11 SCREEN FOR COLON CANCER: ICD-10-CM

## 2023-11-20 DIAGNOSIS — E11.40 TYPE 2 DIABETES MELLITUS WITH DIABETIC NEUROPATHY, WITH LONG-TERM CURRENT USE OF INSULIN (HCC): ICD-10-CM

## 2023-11-20 DIAGNOSIS — Z79.4 TYPE 2 DIABETES MELLITUS WITH OTHER SPECIFIED COMPLICATION, WITH LONG-TERM CURRENT USE OF INSULIN (HCC): Primary | ICD-10-CM

## 2023-11-20 DIAGNOSIS — J45.909 UNCOMPLICATED ASTHMA, UNSPECIFIED ASTHMA SEVERITY, UNSPECIFIED WHETHER PERSISTENT: ICD-10-CM

## 2023-11-20 PROCEDURE — 3080F DIAST BP >= 90 MM HG: CPT | Performed by: INTERNAL MEDICINE

## 2023-11-20 PROCEDURE — 99204 OFFICE O/P NEW MOD 45 MIN: CPT | Performed by: INTERNAL MEDICINE

## 2023-11-20 PROCEDURE — 3046F HEMOGLOBIN A1C LEVEL >9.0%: CPT | Performed by: INTERNAL MEDICINE

## 2023-11-20 PROCEDURE — 3077F SYST BP >= 140 MM HG: CPT | Performed by: INTERNAL MEDICINE

## 2023-11-20 RX ORDER — AMLODIPINE BESYLATE 2.5 MG/1
2.5 TABLET ORAL DAILY
Qty: 30 TABLET | Refills: 0 | Status: SHIPPED | OUTPATIENT
Start: 2023-11-20

## 2023-11-20 RX ORDER — LISINOPRIL AND HYDROCHLOROTHIAZIDE 20; 12.5 MG/1; MG/1
1 TABLET ORAL 2 TIMES DAILY
Qty: 30 TABLET | Refills: 0 | Status: SHIPPED | OUTPATIENT
Start: 2023-11-20

## 2023-11-20 RX ORDER — LEVOTHYROXINE SODIUM 0.1 MG/1
100 TABLET ORAL DAILY
Qty: 30 TABLET | Refills: 0 | Status: SHIPPED | OUTPATIENT
Start: 2023-11-20

## 2023-11-20 RX ORDER — ALBUTEROL SULFATE 90 UG/1
2 AEROSOL, METERED RESPIRATORY (INHALATION) EVERY 4 HOURS PRN
Qty: 18 G | Refills: 0 | Status: SHIPPED | OUTPATIENT
Start: 2023-11-20

## 2023-11-20 RX ORDER — FLASH GLUCOSE SENSOR
1 KIT MISCELLANEOUS
Qty: 2 EACH | Refills: 1 | Status: SHIPPED | OUTPATIENT
Start: 2023-11-20

## 2023-11-20 RX ORDER — ASPIRIN 325 MG
325 TABLET ORAL DAILY
Qty: 30 TABLET | Refills: 0 | Status: SHIPPED | OUTPATIENT
Start: 2023-11-20

## 2023-11-20 RX ORDER — GABAPENTIN 300 MG/1
300 CAPSULE ORAL 3 TIMES DAILY
Qty: 90 CAPSULE | Refills: 0 | Status: SHIPPED | OUTPATIENT
Start: 2023-11-20 | End: 2023-12-20

## 2023-11-20 RX ORDER — ATORVASTATIN CALCIUM 80 MG/1
80 TABLET, FILM COATED ORAL DAILY
Qty: 30 TABLET | Refills: 0 | Status: SHIPPED | OUTPATIENT
Start: 2023-11-20

## 2023-11-20 SDOH — ECONOMIC STABILITY: FOOD INSECURITY: WITHIN THE PAST 12 MONTHS, YOU WORRIED THAT YOUR FOOD WOULD RUN OUT BEFORE YOU GOT MONEY TO BUY MORE.: NEVER TRUE

## 2023-11-20 SDOH — ECONOMIC STABILITY: INCOME INSECURITY: HOW HARD IS IT FOR YOU TO PAY FOR THE VERY BASICS LIKE FOOD, HOUSING, MEDICAL CARE, AND HEATING?: NOT HARD AT ALL

## 2023-11-20 SDOH — ECONOMIC STABILITY: HOUSING INSECURITY
IN THE LAST 12 MONTHS, WAS THERE A TIME WHEN YOU DID NOT HAVE A STEADY PLACE TO SLEEP OR SLEPT IN A SHELTER (INCLUDING NOW)?: NO

## 2023-11-20 SDOH — ECONOMIC STABILITY: FOOD INSECURITY: WITHIN THE PAST 12 MONTHS, THE FOOD YOU BOUGHT JUST DIDN'T LAST AND YOU DIDN'T HAVE MONEY TO GET MORE.: NEVER TRUE

## 2023-11-20 ASSESSMENT — PATIENT HEALTH QUESTIONNAIRE - PHQ9
5. POOR APPETITE OR OVEREATING: 3
SUM OF ALL RESPONSES TO PHQ QUESTIONS 1-9: 16
SUM OF ALL RESPONSES TO PHQ QUESTIONS 1-9: 16
3. TROUBLE FALLING OR STAYING ASLEEP: 3
7. TROUBLE CONCENTRATING ON THINGS, SUCH AS READING THE NEWSPAPER OR WATCHING TELEVISION: 3
SUM OF ALL RESPONSES TO PHQ QUESTIONS 1-9: 16
2. FEELING DOWN, DEPRESSED OR HOPELESS: 2
1. LITTLE INTEREST OR PLEASURE IN DOING THINGS: 2
10. IF YOU CHECKED OFF ANY PROBLEMS, HOW DIFFICULT HAVE THESE PROBLEMS MADE IT FOR YOU TO DO YOUR WORK, TAKE CARE OF THINGS AT HOME, OR GET ALONG WITH OTHER PEOPLE: 2
SUM OF ALL RESPONSES TO PHQ9 QUESTIONS 1 & 2: 4
9. THOUGHTS THAT YOU WOULD BE BETTER OFF DEAD, OR OF HURTING YOURSELF: 0
SUM OF ALL RESPONSES TO PHQ QUESTIONS 1-9: 16
4. FEELING TIRED OR HAVING LITTLE ENERGY: 3
6. FEELING BAD ABOUT YOURSELF - OR THAT YOU ARE A FAILURE OR HAVE LET YOURSELF OR YOUR FAMILY DOWN: 0
8. MOVING OR SPEAKING SO SLOWLY THAT OTHER PEOPLE COULD HAVE NOTICED. OR THE OPPOSITE, BEING SO FIGETY OR RESTLESS THAT YOU HAVE BEEN MOVING AROUND A LOT MORE THAN USUAL: 0

## 2023-11-20 ASSESSMENT — ENCOUNTER SYMPTOMS
EYES NEGATIVE: 1
ALLERGIC/IMMUNOLOGIC NEGATIVE: 1
GASTROINTESTINAL NEGATIVE: 1
RESPIRATORY NEGATIVE: 1

## 2023-11-20 NOTE — PROGRESS NOTES
Subjective:   Stephanie Snyder was seen today for New Patient    Patient has history of diabetes for more than 20 years. Patient had been on Levemir 40 units twice a day and Humalog insulin 12 units 3 times a day with meals. Patient also has history of hypertension for about 8 years. Patient had been on amlodipine 2.5 mg every day and lisinopril hydrochlorothiazide 20/12.5 mg every day. Patient gives history of diabetic neuropathy. Patient was on gabapentin 300 mg 3 times a day. Patient was discharged from previous practice as she was not compliant with her visits and investigations. Patient ran out of prescription and she was not taking any medications for last about a month. Patient would like to see behavioral health as she is having some anxiety because of the recent deaths in the family. No suicidal ideation. Patient also gives history of hypothyroidism. Patient had been on 100 mcg of levothyroxine. Patient does not have any chest pain or palpitations or shortness of breath. No GI/ symptoms. Patient does not smoke or do drugs or takes alcohol. Patient has not had colonoscopy or mammogram done. Patient had Pap smear done this year. Patient had 2 series injections of COVID-vaccine, no booster. Patient had flu vaccine this season. Patient plans to get other vaccines from the pharmacy. Patient works as a . Patient does not exercise.           Past Medical History:   Diagnosis Date    Adverse effect of anesthesia     Slow to awaken    Asthma     Bronchitis     Cataract     Diabetes ([address])     IDDM    Endocrine disease     hyperthyroidism    Endometriosis     Hypertension     Irregular bleeding     Migraine     Pelvic pain     Stroke ([address]) 2019    No weakness    Thyroid disease     hypothyroid cc       Past Surgical History:   Procedure Laterality Date     SECTION      x 3    COLONOSCOPY      CORNEAL TRANSPLANT Bilateral 2021    Lens implants

## 2023-11-20 NOTE — ASSESSMENT & PLAN NOTE
On Levemir 40 units twice a day. Humalog 12 units 3 times a day.   Patient to follow-up with ophthalmologist for diabetic retinal exam.

## 2023-11-20 NOTE — PROGRESS NOTES
Marshall Mora presents today for   Chief Complaint   Patient presents with    New Patient                 1. \"Have you been to the ER, urgent care clinic since your last visit? Hospitalized since your last visit? \" Yes  [facility]  ER  asthma flare    2. \"Have you seen or consulted any other health care providers outside of the [address] [place] [address] since your last visit? \" no     3. For patients aged 43-73: Has the patient had a colonoscopy / FIT/ Cologuard? Yes - Care Gap present. Rooming MA/LPN to request most recent results      If the patient is female:    4. For patients aged 43-66: Has the patient had a mammogram within the past 2 years? Yes - Care Gap present. Rooming MA/LPN to request most recent results      5. For patients aged 21-65: Has the patient had a pap smear?  Yes - no Care Gap present

## 2023-11-20 NOTE — ASSESSMENT & PLAN NOTE
Patient had not been taking amlodipine, lisinopril hydrochlorothiazide as prescribed as she ran out of medications. Prescription sent.   Patient to keep a log of blood pressure at home

## 2023-11-21 RX ORDER — LEVOTHYROXINE SODIUM 0.1 MG/1
100 TABLET ORAL DAILY
Qty: 90 TABLET | OUTPATIENT
Start: 2023-11-21

## 2023-11-21 RX ORDER — ATORVASTATIN CALCIUM 80 MG/1
80 TABLET, FILM COATED ORAL DAILY
Qty: 90 TABLET | OUTPATIENT
Start: 2023-11-21

## 2023-11-21 RX ORDER — AMLODIPINE BESYLATE 2.5 MG/1
2.5 TABLET ORAL DAILY
Qty: 90 TABLET | OUTPATIENT
Start: 2023-11-21

## 2023-12-20 PROBLEM — Z12.11 SCREEN FOR COLON CANCER: Status: RESOLVED | Noted: 2023-11-20 | Resolved: 2023-12-20

## 2023-12-20 PROBLEM — Z12.31 ENCOUNTER FOR SCREENING MAMMOGRAM FOR MALIGNANT NEOPLASM OF BREAST: Status: RESOLVED | Noted: 2023-11-20 | Resolved: 2023-12-20

## 2023-12-20 PROBLEM — Z12.4 SCREENING FOR CERVICAL CANCER: Status: RESOLVED | Noted: 2023-11-20 | Resolved: 2023-12-20

## 2023-12-26 ENCOUNTER — PROCEDURE VISIT (OUTPATIENT)
Age: 53
End: 2023-12-26

## 2023-12-26 ENCOUNTER — HOSPITAL ENCOUNTER (OUTPATIENT)
Facility: HOSPITAL | Age: 53
Discharge: HOME | End: 2023-12-29

## 2023-12-26 VITALS — HEIGHT: 67 IN | WEIGHT: 184 LBS | BODY MASS INDEX: 28.88 KG/M2

## 2023-12-26 DIAGNOSIS — J45.909 UNCOMPLICATED ASTHMA, UNSPECIFIED ASTHMA SEVERITY, UNSPECIFIED WHETHER PERSISTENT: Primary | ICD-10-CM

## 2023-12-26 LAB
HBA1C MFR BLD HPLC: 14.6 %
SENTARA SPECIMEN COLLECTION: NORMAL

## 2023-12-27 ENCOUNTER — OFFICE VISIT (OUTPATIENT)
Age: 53
End: 2023-12-27
Payer: MEDICAID

## 2023-12-27 ENCOUNTER — TELEPHONE (OUTPATIENT)
Age: 53
End: 2023-12-27

## 2023-12-27 VITALS
WEIGHT: 185 LBS | TEMPERATURE: 97 F | BODY MASS INDEX: 28.98 KG/M2 | SYSTOLIC BLOOD PRESSURE: 156 MMHG | OXYGEN SATURATION: 96 % | DIASTOLIC BLOOD PRESSURE: 82 MMHG | HEART RATE: 81 BPM

## 2023-12-27 DIAGNOSIS — Z23 IMMUNIZATION DUE: ICD-10-CM

## 2023-12-27 DIAGNOSIS — M25.40 JOINT SWELLING: ICD-10-CM

## 2023-12-27 DIAGNOSIS — Z79.4 TYPE 2 DIABETES MELLITUS WITH OTHER SPECIFIED COMPLICATION, WITH LONG-TERM CURRENT USE OF INSULIN (HCC): ICD-10-CM

## 2023-12-27 DIAGNOSIS — I10 PRIMARY HYPERTENSION: ICD-10-CM

## 2023-12-27 DIAGNOSIS — E03.9 HYPOTHYROIDISM, UNSPECIFIED TYPE: ICD-10-CM

## 2023-12-27 DIAGNOSIS — E78.5 HYPERLIPIDEMIA, UNSPECIFIED HYPERLIPIDEMIA TYPE: ICD-10-CM

## 2023-12-27 DIAGNOSIS — Z23 FLU VACCINE NEED: ICD-10-CM

## 2023-12-27 DIAGNOSIS — G62.9 NEUROPATHY: ICD-10-CM

## 2023-12-27 DIAGNOSIS — Z12.31 ENCOUNTER FOR SCREENING MAMMOGRAM FOR MALIGNANT NEOPLASM OF BREAST: Primary | ICD-10-CM

## 2023-12-27 DIAGNOSIS — E11.69 TYPE 2 DIABETES MELLITUS WITH OTHER SPECIFIED COMPLICATION, WITH LONG-TERM CURRENT USE OF INSULIN (HCC): ICD-10-CM

## 2023-12-27 LAB
A/G RATIO: 1.6 RATIO (ref 1.1–2.6)
ALBUMIN SERPL-MCNC: 4.2 G/DL (ref 3.5–5)
ALP BLD-CCNC: 130 U/L (ref 25–115)
ALT SERPL-CCNC: 30 U/L (ref 5–40)
ANION GAP SERPL CALCULATED.3IONS-SCNC: 14 MMOL/L (ref 3–15)
AST SERPL-CCNC: 53 U/L (ref 10–37)
AVERAGE GLUCOSE: 373 MG/DL (ref 91–123)
BASOPHILS # BLD: 1 % (ref 0–2)
BASOPHILS ABSOLUTE: 0 K/UL (ref 0–0.2)
BILIRUB SERPL-MCNC: 0.7 MG/DL (ref 0.2–1.2)
BUN BLDV-MCNC: 21 MG/DL (ref 6–22)
CALCIUM SERPL-MCNC: 9 MG/DL (ref 8.4–10.5)
CHLORIDE BLD-SCNC: 102 MMOL/L (ref 98–110)
CHOLESTEROL/HDL RATIO: 2.2 (ref 0–5)
CHOLESTEROL: 113 MG/DL (ref 110–200)
CO2: 24 MMOL/L (ref 20–32)
CREAT SERPL-MCNC: 0.9 MG/DL (ref 0.5–1.2)
EOSINOPHIL # BLD: 3 % (ref 0–6)
EOSINOPHILS ABSOLUTE: 0.3 K/UL (ref 0–0.5)
GLOBULIN: 2.7 G/DL (ref 2–4)
GLOMERULAR FILTRATION RATE: >60 ML/MIN/1.73 SQ.M.
GLUCOSE: 505 MG/DL (ref 70–99)
HBA1C MFR BLD: 14.6 % (ref 4.8–5.6)
HCT VFR BLD CALC: 41.5 % (ref 35.1–48)
HDLC SERPL-MCNC: 51 MG/DL
HEMOGLOBIN: 12 G/DL (ref 11.7–16)
LDL CHOLESTEROL CALCULATED: 37 MG/DL (ref 50–99)
LDL/HDL RATIO: 0.7
LYMPHOCYTES # BLD: 30 % (ref 20–45)
LYMPHOCYTES ABSOLUTE: 2.5 K/UL (ref 1–4.8)
MCH RBC QN AUTO: 24 PG (ref 26–34)
MCHC RBC AUTO-ENTMCNC: 29 G/DL (ref 31–36)
MCV RBC AUTO: 85 FL (ref 80–99)
MONOCYTES ABSOLUTE: 0.6 K/UL (ref 0.1–1)
MONOCYTES: 8 % (ref 3–12)
NEUTROPHILS ABSOLUTE: 4.8 K/UL (ref 1.8–7.7)
NEUTROPHILS: 58 % (ref 40–75)
NON-HDL CHOLESTEROL: 62 MG/DL
PDW BLD-RTO: 13.8 % (ref 10–15.5)
PLATELET # BLD: 269 K/UL (ref 140–440)
PMV BLD AUTO: 11.9 FL (ref 9–13)
POTASSIUM SERPL-SCNC: 4.5 MMOL/L (ref 3.5–5.5)
RBC: 4.91 M/UL (ref 3.8–5.2)
SODIUM BLD-SCNC: 140 MMOL/L (ref 133–145)
T4 FREE: 1.2 NG/DL (ref 0.9–1.8)
TOTAL PROTEIN: 6.9 G/DL (ref 6.4–8.3)
TRIGL SERPL-MCNC: 124 MG/DL (ref 40–149)
TSH SERPL DL<=0.05 MIU/L-ACNC: 1.46 MCU/ML (ref 0.27–4.2)
VLDLC SERPL CALC-MCNC: 25 MG/DL (ref 8–30)
WBC: 8.2 K/UL (ref 4–11)

## 2023-12-27 PROCEDURE — 3077F SYST BP >= 140 MM HG: CPT | Performed by: INTERNAL MEDICINE

## 2023-12-27 PROCEDURE — 3046F HEMOGLOBIN A1C LEVEL >9.0%: CPT | Performed by: INTERNAL MEDICINE

## 2023-12-27 PROCEDURE — 99214 OFFICE O/P EST MOD 30 MIN: CPT | Performed by: INTERNAL MEDICINE

## 2023-12-27 PROCEDURE — 90674 CCIIV4 VAC NO PRSV 0.5 ML IM: CPT | Performed by: INTERNAL MEDICINE

## 2023-12-27 PROCEDURE — 3079F DIAST BP 80-89 MM HG: CPT | Performed by: INTERNAL MEDICINE

## 2023-12-27 PROCEDURE — PBSHW INFLUENZA, FLUCELVAX, (AGE 6 MO+), IM, PF, 0.5 ML: Performed by: INTERNAL MEDICINE

## 2023-12-27 RX ORDER — LISINOPRIL AND HYDROCHLOROTHIAZIDE 20; 12.5 MG/1; MG/1
1 TABLET ORAL 2 TIMES DAILY
Qty: 30 TABLET | Refills: 0 | Status: SHIPPED | OUTPATIENT
Start: 2023-12-27

## 2023-12-27 NOTE — TELEPHONE ENCOUNTER
Lab called this morning with critical glucose over 500. Will defer management to you since you just saw her yesterday and she has been off her insulin for 1 month

## 2023-12-27 NOTE — PROGRESS NOTES
Mavis Delaney (: 1970) is a 52 y.o. female, here for evaluation of the following chief complaint(s):  Discuss Labs and Diabetes       ASSESSMENT/PLAN:  Below is the assessment and plan developed based on review of pertinent history, physical exam, labs, studies, and medications. 1. Encounter for screening mammogram for malignant neoplasm of breast  -     SHANA [facility]AY DIGITAL SCREEN BILATERAL; Future  2. Type 2 diabetes mellitus with other specified complication, with long-term current use of insulin ([address])  Assessment & Plan: To increase Lantus insulin to 44 units twice a day. NovoLog 15 units 3 times a day with meals. Please start on Trulicity. Patient to watch her diet and exercise regularly. Patient will be keeping a log of blood sugars before breakfast, lunch, dinner and bring it next office visit. Orders:  -     insulin aspart (NOVOLOG) 100 UNIT/ML injection pen; 15 units 3 times a day before breakfast, lunch and dinner and <150 BS/0 units, 151-200/2 units, 201-250/4 units, 251-300/6 units, 301-350/8 units, >351-400/10 units and call provider, Disp-5 Adjustable Dose Pre-filled Pen Syringe, R-0Normal  -     Dulaglutide 0.75 MG/0.5ML SOPN; Inject 0.75 mg into the skin once a week, Disp-3 mL, R-3Normal  -     insulin detemir (LEVEMIR) 100 UNIT/ML injection vial; ADMINISTER 44 UNITS UNDER THE SKIN TWICE DAILY, Disp-20 mL, R-0Normal  -     Amb External Referral To Podiatry  -     Microalbumin / Creatinine Urine Ratio; Future  3. Neuropathy  Assessment & Plan:   Stable on gabapentin. Orders:  -     lisinopril-hydroCHLOROthiazide (PRINZIDE;ZESTORETIC) 20-12.5 MG per tablet; Take 1 tablet by mouth 2 times daily, Disp-30 tablet, R-0Normal  4. Flu vaccine need  -     Influenza, FLUCELVAX, (age 10 mo+), IM, Preservative Free, 0.5 mL  5. Joint swelling  -     External Referral To Rheumatology  6. Immunization due  7.  Primary hypertension  Assessment & Plan:   Patient informed that he forgot to take her blood

## 2023-12-27 NOTE — ASSESSMENT & PLAN NOTE
To increase Lantus insulin to 44 units twice a day. NovoLog 15 units 3 times a day with meals. Please start on Trulicity. Patient to watch her diet and exercise regularly. Patient will be keeping a log of blood sugars before breakfast, lunch, dinner and bring it next office visit.

## 2023-12-27 NOTE — ASSESSMENT & PLAN NOTE
Patient informed that he forgot to take her blood pressure medication today morning. Patient will be keeping up log of blood pressure at home and bring it next office visit.

## 2023-12-27 NOTE — PROGRESS NOTES
Robert Yanez presents today for   Chief Complaint   Patient presents with    Discuss Labs    Diabetes       Sameer Bangura  1970     Any symptoms reactions allergies that would exclude them from being immunized today. no     Risk and adverse reactions were discussed and the VIS was given to them all questions addressed. yes      Order placed for Influenza per verbal order for with read back. yes     Lot # [id number]     [address] # [id number]     Exp 06/27/2024                1. \"Have you been to the ER, urgent care clinic since your last visit? Hospitalized since your last visit? \" no    2. \"Have you seen or consulted any other health care providers outside of the [address] [place] [address] since your last visit? \" no     3. For patients aged 43-73: Has the patient had a colonoscopy / FIT/ Cologuard? No      If the patient is female:    4. For patients aged 43-66: Has the patient had a mammogram within the past 2 years? No      5. For patients aged 21-65: Has the patient had a pap smear?  No

## 2023-12-28 LAB
CREATININE URINE: 53 MG/DL
MICROALB/CREAT RATIO (UG/MG CREAT.): NORMAL
MICROALBUMIN/CREAT 24H UR: <12 MG/L (ref 0.1–17)

## 2024-01-09 RX ORDER — EZETIMIBE 10 MG/1
TABLET ORAL
Qty: 90 TABLET | Refills: 1 | OUTPATIENT
Start: 2024-01-09

## 2024-01-10 DIAGNOSIS — Z79.4 TYPE 2 DIABETES MELLITUS WITH DIABETIC NEUROPATHY, WITH LONG-TERM CURRENT USE OF INSULIN (HCC): ICD-10-CM

## 2024-01-10 DIAGNOSIS — G62.9 NEUROPATHY: ICD-10-CM

## 2024-01-10 DIAGNOSIS — E11.40 TYPE 2 DIABETES MELLITUS WITH DIABETIC NEUROPATHY, WITH LONG-TERM CURRENT USE OF INSULIN (HCC): ICD-10-CM

## 2024-01-11 RX ORDER — LISINOPRIL AND HYDROCHLOROTHIAZIDE 20; 12.5 MG/1; MG/1
1 TABLET ORAL 2 TIMES DAILY
Qty: 30 TABLET | Refills: 0 | Status: SHIPPED | OUTPATIENT
Start: 2024-01-11

## 2024-01-11 RX ORDER — GABAPENTIN 300 MG/1
CAPSULE ORAL
Qty: 90 CAPSULE | Refills: 0 | Status: SHIPPED | OUTPATIENT
Start: 2024-01-11 | End: 2024-02-10

## 2024-01-23 ENCOUNTER — OFFICE VISIT (OUTPATIENT)
Age: 54
End: 2024-01-23
Payer: MEDICAID

## 2024-01-23 VITALS
DIASTOLIC BLOOD PRESSURE: 87 MMHG | BODY MASS INDEX: 29.07 KG/M2 | HEART RATE: 82 BPM | OXYGEN SATURATION: 100 % | RESPIRATION RATE: 16 BRPM | WEIGHT: 185.2 LBS | HEIGHT: 67 IN | TEMPERATURE: 97.2 F | SYSTOLIC BLOOD PRESSURE: 146 MMHG

## 2024-01-23 DIAGNOSIS — R06.09 DYSPNEA ON EXERTION: ICD-10-CM

## 2024-01-23 DIAGNOSIS — J30.2 CHRONIC SEASONAL ALLERGIC RHINITIS: ICD-10-CM

## 2024-01-23 DIAGNOSIS — J45.50 SEVERE PERSISTENT ASTHMA WITHOUT COMPLICATION: Primary | ICD-10-CM

## 2024-01-23 PROCEDURE — 3077F SYST BP >= 140 MM HG: CPT | Performed by: INTERNAL MEDICINE

## 2024-01-23 PROCEDURE — 99204 OFFICE O/P NEW MOD 45 MIN: CPT | Performed by: INTERNAL MEDICINE

## 2024-01-23 PROCEDURE — 3079F DIAST BP 80-89 MM HG: CPT | Performed by: INTERNAL MEDICINE

## 2024-01-23 RX ORDER — MONTELUKAST SODIUM 10 MG/1
10 TABLET ORAL DAILY
Qty: 30 TABLET | Refills: 3 | Status: SHIPPED | OUTPATIENT
Start: 2024-01-23

## 2024-01-23 RX ORDER — BUDESONIDE AND FORMOTEROL FUMARATE DIHYDRATE 160; 4.5 UG/1; UG/1
2 AEROSOL RESPIRATORY (INHALATION) 2 TIMES DAILY
Qty: 30.6 G | Refills: 1 | Status: SHIPPED | OUTPATIENT
Start: 2024-01-23

## 2024-01-23 NOTE — PROGRESS NOTES
Jerri Turner presents today for   Chief Complaint   Patient presents with    New Patient    Asthma       Is someone accompanying this pt? No    Is the patient using any DME equipment during OV? No    -DME Company NA    Depression Screenin/27/2023     9:53 AM   PHQ-9 Questionaire   Little interest or pleasure in doing things 0   Feeling down, depressed, or hopeless 0   PHQ-9 Total Score 0       Learning Needs Questionnaire:     No question data found.      Fall Risk:          No data to display                 Abuse Screening:          No data to display                  Coordination of Care:    1. Have you been to the ER, urgent care clinic since your last visit? Hospitalized since your last visit? No    2. Have you seen or consulted any other health care providers outside of the [facility] since your last visit? Include any pap smears or colon screening. No    Medication list has been update per patient.

## 2024-01-23 NOTE — PATIENT INSTRUCTIONS
What is the plan?  -Start Symbicort  -Stop Advair  -Stop Lisinopril - discuss with your primary care provider to make this change.  -Complete 2 other parts of lung function test  -Complete Labwork  -Start on Singulair (montelukast) nightly

## 2024-01-23 NOTE — PROGRESS NOTES
[facility]  Pulmonary, Critical Care, and Sleep Medicine      Pulmonary Office Initial Consultation    Name: Jerri Turner     : 1970     Date: 2024        Subjective:   Chief complaint: Asthma, SOB  Patient is a 53 y.o. female with a PMHx of Asthma, CVA, Hypothyroidism, DM, Endometriosis, HTN, Cataracts and Migraines.    HPI (24):  Today in clinic, she is here to establish care.    Dyspnea: states that her breathing has been getting worse over last 8-10 months. Have been needing to use her inhaler more often.  Asthma - diagnosed while in her 20s. She was pregnant at the time and had significant dyspnea on minimal exertion. Symptoms apparently worsened post-partum. She used to run track in high school and used to use her albuterol inhaler as needed. It was suggested she stop running track. Has had an inhaler since the age of 12. She continues to have dyspnea with walking up the steps and just walking around on level ground. Notes intermittent wheezing. Has never been on Singulair before.   Recently had the flu and laryngitis and was sick for 2 months; just now getting her voice back.    Cough: feels like she has always had a cough; fluctuating in intensity.   Phlegm and/or sputum production: sometimes productive of phlegm.  Hemoptysis: none  Nigh time symptoms:  notes coughing in her sleep nightly. Notes night time awakenings and cough.    Triggers - smells, flowers, men's cologne, grass, bleach, cold and weather changes.  Uses a mask while she is using bleach while cleaning her home.  Tends to hold her breath around people in effort to limit exposure to significant smells/scents that may trigger her symptoms.    Notes a history of significant loss in the past 6 months (brother, both parents, and 2 uncles). Not depressed but admits to grieving.  She reports a history of hyperthyroidism.     Current inhalers and/or respiratory treatments:  -Albuterol rescue inhaler - using

## 2024-02-12 DIAGNOSIS — Z79.4 TYPE 2 DIABETES MELLITUS WITH OTHER SPECIFIED COMPLICATION, WITH LONG-TERM CURRENT USE OF INSULIN (HCC): ICD-10-CM

## 2024-02-12 DIAGNOSIS — E11.69 TYPE 2 DIABETES MELLITUS WITH OTHER SPECIFIED COMPLICATION, WITH LONG-TERM CURRENT USE OF INSULIN (HCC): ICD-10-CM

## 2024-02-12 DIAGNOSIS — G62.9 NEUROPATHY: ICD-10-CM

## 2024-02-12 RX ORDER — INSULIN DETEMIR 100 [IU]/ML
INJECTION, SOLUTION SUBCUTANEOUS
Qty: 20 ML | Refills: 0 | Status: SHIPPED | OUTPATIENT
Start: 2024-02-12

## 2024-02-12 RX ORDER — LISINOPRIL AND HYDROCHLOROTHIAZIDE 20; 12.5 MG/1; MG/1
1 TABLET ORAL 2 TIMES DAILY
Qty: 30 TABLET | Refills: 0 | Status: SHIPPED | OUTPATIENT
Start: 2024-02-12

## 2024-02-12 NOTE — TELEPHONE ENCOUNTER
Last OV: 12/27/2023  Next OV: 2/21/2024  Last refill:   1/11/2024 -- Lisinopril- HCTZ  12/27/2023 -- Levemir

## 2024-03-05 RX ORDER — CYCLOBENZAPRINE HCL 10 MG
TABLET ORAL
Qty: 30 TABLET | Refills: 2 | OUTPATIENT
Start: 2024-03-05

## 2024-03-13 ENCOUNTER — OFFICE VISIT (OUTPATIENT)
Age: 54
End: 2024-03-13
Payer: MEDICAID

## 2024-03-13 VITALS
WEIGHT: 182.2 LBS | OXYGEN SATURATION: 96 % | RESPIRATION RATE: 16 BRPM | BODY MASS INDEX: 28.6 KG/M2 | HEIGHT: 67 IN | HEART RATE: 82 BPM | SYSTOLIC BLOOD PRESSURE: 132 MMHG | DIASTOLIC BLOOD PRESSURE: 86 MMHG | TEMPERATURE: 98.2 F

## 2024-03-13 DIAGNOSIS — E03.9 HYPOTHYROIDISM, UNSPECIFIED TYPE: ICD-10-CM

## 2024-03-13 DIAGNOSIS — E78.5 HYPERLIPIDEMIA, UNSPECIFIED HYPERLIPIDEMIA TYPE: ICD-10-CM

## 2024-03-13 DIAGNOSIS — Z12.11 SCREEN FOR COLON CANCER: Primary | ICD-10-CM

## 2024-03-13 DIAGNOSIS — I10 PRIMARY HYPERTENSION: ICD-10-CM

## 2024-03-13 DIAGNOSIS — E11.40 TYPE 2 DIABETES MELLITUS WITH DIABETIC NEUROPATHY, WITH LONG-TERM CURRENT USE OF INSULIN (HCC): ICD-10-CM

## 2024-03-13 DIAGNOSIS — Z79.4 TYPE 2 DIABETES MELLITUS WITH DIABETIC NEUROPATHY, WITH LONG-TERM CURRENT USE OF INSULIN (HCC): ICD-10-CM

## 2024-03-13 DIAGNOSIS — G62.9 NEUROPATHY: ICD-10-CM

## 2024-03-13 PROCEDURE — 3079F DIAST BP 80-89 MM HG: CPT | Performed by: INTERNAL MEDICINE

## 2024-03-13 PROCEDURE — 99214 OFFICE O/P EST MOD 30 MIN: CPT | Performed by: INTERNAL MEDICINE

## 2024-03-13 PROCEDURE — 3075F SYST BP GE 130 - 139MM HG: CPT | Performed by: INTERNAL MEDICINE

## 2024-03-13 RX ORDER — LEVOTHYROXINE SODIUM 0.1 MG/1
100 TABLET ORAL DAILY
Qty: 30 TABLET | Refills: 1 | Status: SHIPPED | OUTPATIENT
Start: 2024-03-13

## 2024-03-13 RX ORDER — LISINOPRIL AND HYDROCHLOROTHIAZIDE 20; 12.5 MG/1; MG/1
1 TABLET ORAL 2 TIMES DAILY
Qty: 30 TABLET | Refills: 1 | Status: SHIPPED | OUTPATIENT
Start: 2024-03-13

## 2024-03-13 RX ORDER — DULOXETIN HYDROCHLORIDE 20 MG/1
20 CAPSULE, DELAYED RELEASE ORAL DAILY
Qty: 30 CAPSULE | Refills: 3 | Status: SHIPPED | OUTPATIENT
Start: 2024-03-13

## 2024-03-13 RX ORDER — LEVOTHYROXINE SODIUM 0.1 MG/1
100 TABLET ORAL DAILY
Qty: 90 TABLET | OUTPATIENT
Start: 2024-03-13

## 2024-03-13 RX ORDER — ATORVASTATIN CALCIUM 80 MG/1
80 TABLET, FILM COATED ORAL DAILY
Qty: 90 TABLET | OUTPATIENT
Start: 2024-03-13

## 2024-03-13 RX ORDER — GABAPENTIN 300 MG/1
300 CAPSULE ORAL 3 TIMES DAILY
Qty: 90 CAPSULE | Refills: 0 | Status: SHIPPED | OUTPATIENT
Start: 2024-03-13 | End: 2024-04-12

## 2024-03-13 RX ORDER — ATORVASTATIN CALCIUM 80 MG/1
80 TABLET, FILM COATED ORAL DAILY
Qty: 30 TABLET | Refills: 1 | Status: SHIPPED | OUTPATIENT
Start: 2024-03-13

## 2024-03-13 RX ORDER — FLASH GLUCOSE SENSOR
1 KIT MISCELLANEOUS
Qty: 2 EACH | Refills: 1 | Status: SHIPPED | OUTPATIENT
Start: 2024-03-13

## 2024-03-13 RX ORDER — AMLODIPINE BESYLATE 2.5 MG/1
2.5 TABLET ORAL DAILY
Qty: 90 TABLET | OUTPATIENT
Start: 2024-03-13

## 2024-03-13 RX ORDER — AMLODIPINE BESYLATE 2.5 MG/1
2.5 TABLET ORAL DAILY
Qty: 30 TABLET | Refills: 1 | Status: SHIPPED | OUTPATIENT
Start: 2024-03-13

## 2024-03-13 SDOH — ECONOMIC STABILITY: INCOME INSECURITY: HOW HARD IS IT FOR YOU TO PAY FOR THE VERY BASICS LIKE FOOD, HOUSING, MEDICAL CARE, AND HEATING?: NOT HARD AT ALL

## 2024-03-13 SDOH — ECONOMIC STABILITY: FOOD INSECURITY: WITHIN THE PAST 12 MONTHS, YOU WORRIED THAT YOUR FOOD WOULD RUN OUT BEFORE YOU GOT MONEY TO BUY MORE.: NEVER TRUE

## 2024-03-13 SDOH — ECONOMIC STABILITY: FOOD INSECURITY: WITHIN THE PAST 12 MONTHS, THE FOOD YOU BOUGHT JUST DIDN'T LAST AND YOU DIDN'T HAVE MONEY TO GET MORE.: NEVER TRUE

## 2024-03-13 ASSESSMENT — PATIENT HEALTH QUESTIONNAIRE - PHQ9
SUM OF ALL RESPONSES TO PHQ QUESTIONS 1-9: 0
1. LITTLE INTEREST OR PLEASURE IN DOING THINGS: 0
SUM OF ALL RESPONSES TO PHQ QUESTIONS 1-9: 0
SUM OF ALL RESPONSES TO PHQ QUESTIONS 1-9: 0
2. FEELING DOWN, DEPRESSED OR HOPELESS: 0
SUM OF ALL RESPONSES TO PHQ QUESTIONS 1-9: 0
SUM OF ALL RESPONSES TO PHQ9 QUESTIONS 1 & 2: 0

## 2024-03-13 ASSESSMENT — ENCOUNTER SYMPTOMS
EYES NEGATIVE: 1
GASTROINTESTINAL NEGATIVE: 1
ALLERGIC/IMMUNOLOGIC NEGATIVE: 1
RESPIRATORY NEGATIVE: 1

## 2024-03-13 NOTE — PROGRESS NOTES
Jerri Turner presents today for   Chief Complaint   Patient presents with    Follow-up           \"Have you been to the ER, urgent care clinic since your last visit?  Hospitalized since your last visit?\"    NO    “Have you seen or consulted any other health care providers outside of [facility] since your last visit?”    NO    “Have you had a colorectal cancer screening such as a colonoscopy/FIT/Cologuard?    NO     Have you had a mammogram?”   NO         
and reactive to light.   Cardiovascular:      Rate and Rhythm: Normal rate and regular rhythm.      Heart sounds: Normal heart sounds.   Pulmonary:      Breath sounds: Normal breath sounds.   Abdominal:      General: Abdomen is flat.      Palpations: Abdomen is soft.   Musculoskeletal:         General: Normal range of motion.      Cervical back: Normal range of motion and neck supple.   Skin:     General: Skin is warm.   Neurological:      General: No focal deficit present.      Mental Status: She is alert. Mental status is at baseline.         We discussed the diagnosis, risks and benefits of medications    Disclaimer:    The patient understands our medical plan.  Alternatives have been explained and offered.  The risks, benefits and significant side effects of all medications have been reviewed. Anticipated time course and progression of condition reviewed. All questions have been addressed.  She is encouraged to employ the information provided in the after visit summary, which was reviewed.      Where appropriate, she is instructed to call the clinic if she has not been notified either by phone or through MyChart with the results of her tests or with an appointment plan for any referrals within 1 week(s).  No news is not good news; it's no news. The patient  is to call if her condition worsens or fails to improve or if significant side effects are experienced.     Aspects of this note may have been generated using voice recognition software. Despite editing, there may be unrecognized errors.                 An electronic signature was used to authenticate this note.  -- Jayme Choudhury MD

## 2024-03-13 NOTE — ASSESSMENT & PLAN NOTE
Recheck HbA1c.  Patient will be keeping a log of blood sugars 3 times a day before meals and bring it next office visit.

## 2024-03-13 NOTE — ASSESSMENT & PLAN NOTE
Stable on current medications.  Patient will be keeping a log of blood pressure at home and bring it next time

## 2024-03-17 DIAGNOSIS — G62.9 NEUROPATHY: ICD-10-CM

## 2024-03-17 DIAGNOSIS — E78.5 HYPERLIPIDEMIA, UNSPECIFIED HYPERLIPIDEMIA TYPE: ICD-10-CM

## 2024-03-17 DIAGNOSIS — Z79.4 TYPE 2 DIABETES MELLITUS WITH OTHER SPECIFIED COMPLICATION, WITH LONG-TERM CURRENT USE OF INSULIN (HCC): ICD-10-CM

## 2024-03-17 DIAGNOSIS — E11.69 TYPE 2 DIABETES MELLITUS WITH OTHER SPECIFIED COMPLICATION, WITH LONG-TERM CURRENT USE OF INSULIN (HCC): ICD-10-CM

## 2024-03-17 RX ORDER — CYCLOBENZAPRINE HCL 10 MG
TABLET ORAL
Qty: 90 TABLET | OUTPATIENT
Start: 2024-03-17

## 2024-03-18 RX ORDER — ASPIRIN 325 MG
325 TABLET ORAL DAILY
Qty: 90 TABLET | Refills: 0 | Status: SHIPPED | OUTPATIENT
Start: 2024-03-18

## 2024-03-18 RX ORDER — ATORVASTATIN CALCIUM 80 MG/1
80 TABLET, FILM COATED ORAL DAILY
Qty: 90 TABLET | OUTPATIENT
Start: 2024-03-18

## 2024-03-18 RX ORDER — LISINOPRIL AND HYDROCHLOROTHIAZIDE 20; 12.5 MG/1; MG/1
1 TABLET ORAL 2 TIMES DAILY
Qty: 180 TABLET | OUTPATIENT
Start: 2024-03-18

## 2024-03-19 RX ORDER — MONTELUKAST SODIUM 10 MG/1
10 TABLET ORAL DAILY
Qty: 90 TABLET | Refills: 1 | Status: SHIPPED | OUTPATIENT
Start: 2024-03-19

## 2024-04-11 DIAGNOSIS — G62.9 NEUROPATHY: ICD-10-CM

## 2024-04-12 DIAGNOSIS — E11.40 TYPE 2 DIABETES MELLITUS WITH DIABETIC NEUROPATHY, WITH LONG-TERM CURRENT USE OF INSULIN (HCC): ICD-10-CM

## 2024-04-12 DIAGNOSIS — Z79.4 TYPE 2 DIABETES MELLITUS WITH DIABETIC NEUROPATHY, WITH LONG-TERM CURRENT USE OF INSULIN (HCC): ICD-10-CM

## 2024-04-12 PROBLEM — Z12.11 SCREEN FOR COLON CANCER: Status: RESOLVED | Noted: 2023-11-20 | Resolved: 2024-04-12

## 2024-04-14 RX ORDER — LISINOPRIL AND HYDROCHLOROTHIAZIDE 20; 12.5 MG/1; MG/1
1 TABLET ORAL 2 TIMES DAILY
Qty: 30 TABLET | Refills: 1 | Status: SHIPPED | OUTPATIENT
Start: 2024-04-14

## 2024-04-14 RX ORDER — GABAPENTIN 300 MG/1
CAPSULE ORAL
Qty: 90 CAPSULE | Refills: 0 | Status: SHIPPED | OUTPATIENT
Start: 2024-04-14 | End: 2024-05-14

## 2024-05-02 ENCOUNTER — PROCEDURE VISIT (OUTPATIENT)
Age: 54
End: 2024-05-02

## 2024-05-02 ENCOUNTER — OFFICE VISIT (OUTPATIENT)
Age: 54
End: 2024-05-02
Payer: MEDICAID

## 2024-05-02 VITALS
HEART RATE: 79 BPM | DIASTOLIC BLOOD PRESSURE: 76 MMHG | OXYGEN SATURATION: 99 % | RESPIRATION RATE: 14 BRPM | HEIGHT: 67 IN | TEMPERATURE: 97.3 F | BODY MASS INDEX: 28.56 KG/M2 | WEIGHT: 182 LBS | SYSTOLIC BLOOD PRESSURE: 130 MMHG

## 2024-05-02 VITALS
RESPIRATION RATE: 14 BRPM | BODY MASS INDEX: 28.56 KG/M2 | HEIGHT: 67 IN | WEIGHT: 182 LBS | DIASTOLIC BLOOD PRESSURE: 76 MMHG | TEMPERATURE: 97.3 F | SYSTOLIC BLOOD PRESSURE: 130 MMHG

## 2024-05-02 DIAGNOSIS — J45.50 SEVERE PERSISTENT ASTHMA WITHOUT COMPLICATION: Primary | ICD-10-CM

## 2024-05-02 DIAGNOSIS — G47.19 EXCESSIVE DAYTIME SLEEPINESS: ICD-10-CM

## 2024-05-02 DIAGNOSIS — J30.2 CHRONIC SEASONAL ALLERGIC RHINITIS: ICD-10-CM

## 2024-05-02 DIAGNOSIS — R06.83 SNORING: ICD-10-CM

## 2024-05-02 DIAGNOSIS — R06.09 DYSPNEA ON EXERTION: ICD-10-CM

## 2024-05-02 PROCEDURE — 3075F SYST BP GE 130 - 139MM HG: CPT | Performed by: INTERNAL MEDICINE

## 2024-05-02 PROCEDURE — 99214 OFFICE O/P EST MOD 30 MIN: CPT | Performed by: INTERNAL MEDICINE

## 2024-05-02 PROCEDURE — 3078F DIAST BP <80 MM HG: CPT | Performed by: INTERNAL MEDICINE

## 2024-05-02 ASSESSMENT — SLEEP AND FATIGUE QUESTIONNAIRES
HOW LIKELY ARE YOU TO NOD OFF OR FALL ASLEEP WHILE WATCHING TV: HIGH CHANCE OF DOZING
HOW LIKELY ARE YOU TO NOD OFF OR FALL ASLEEP WHEN YOU ARE A PASSENGER IN A CAR FOR AN HOUR WITHOUT A BREAK: HIGH CHANCE OF DOZING
HOW LIKELY ARE YOU TO NOD OFF OR FALL ASLEEP WHILE SITTING AND TALKING TO SOMEONE: MODERATE CHANCE OF DOZING
NECK CIRCUMFERENCE (INCHES): 15
HOW LIKELY ARE YOU TO NOD OFF OR FALL ASLEEP IN A CAR, WHILE STOPPED FOR A FEW MINUTES IN TRAFFIC: WOULD NEVER DOZE
HOW LIKELY ARE YOU TO NOD OFF OR FALL ASLEEP WHILE SITTING AND READING: HIGH CHANCE OF DOZING
ESS TOTAL SCORE: 20
HOW LIKELY ARE YOU TO NOD OFF OR FALL ASLEEP WHILE LYING DOWN TO REST IN THE AFTERNOON WHEN CIRCUMSTANCES PERMIT: HIGH CHANCE OF DOZING
HOW LIKELY ARE YOU TO NOD OFF OR FALL ASLEEP WHILE SITTING QUIETLY AFTER LUNCH WITHOUT ALCOHOL: HIGH CHANCE OF DOZING
HOW LIKELY ARE YOU TO NOD OFF OR FALL ASLEEP WHILE SITTING INACTIVE IN A PUBLIC PLACE: HIGH CHANCE OF DOZING

## 2024-05-02 NOTE — PATIENT INSTRUCTIONS
What is the plan?  -Stop Symbicort  -Start Qvar   -Continue Albuterol rescue inhaler    -Complete sleep study    -Talk to your Primary care provider about stopping the Lisinopril    -Increase activity as much as able

## 2024-05-02 NOTE — PROGRESS NOTES
Jerri Turner presents today for   Chief Complaint   Patient presents with    Asthma     Severe, persistent, uncomplicated       Is someone accompanying this pt? No    Is the patient using any DME equipment during OV? No    -DME Company No    Depression Screening:        3/13/2024    10:04 AM   PHQ-9 Questionaire   Little interest or pleasure in doing things 0   Feeling down, depressed, or hopeless 0   PHQ-9 Total Score 0       Learning Assessment:    Failed to redirect to the Timeline version of the [employer] Smart[employer].    Abuse Screening:         No data to display                Fall Risk    Failed to redirect to the Timeline version of the [employer] Smart[employer].    Coordination of Care:    1. Have you been to the ER, urgent care clinic since your last visit? Hospitalized since your last visit? No    2. Have you seen or consulted any other health care providers outside of the [facility] since your last visit? Include any pap smears or colon screening. No    Medication list has been update per patient.

## 2024-05-02 NOTE — PROGRESS NOTES
[facility]  Pulmonary, Critical Care, and Sleep Medicine      Pulmonary Office follow-up.    Name: Jerri Turner     : 1970     Date: 2024        Subjective:   Chief complaint: Asthma, SOB  Patient is a 54 y.o. female with a PMHx of Asthma, CVA, Hypothyroidism, DM, Endometriosis, HTN, Cataracts and Migraines.    HPI(24):  Today in clinic, she states that it is harder to breath on exertion. Notes dyspnea worse with going up steps or exerting herself too much.  She does stop going if she feels dizzy or lightheaded.  Complains of chest pain after PFT today.  Notes a cough which is going on daily. Notes dry heave also triggered by laughing.  Cough is nonproductive.     GERD - on Prilosec.  Hemoptysis: non reported.  Nigh time symptoms: notes coughing and shortness of breath while sleeping.  Tobacco - none. No vape or MJ use.    HTN - Lisinopril - still on this medication.    Current inhalers and/or respiratory treatments:  -Symbicort 160/4.5 mcg - taking 2-3 days/week. Notes some jittery and shakes after use. Still not as bad as the Advair.  -Albuterol rescue inhaler - using prn; usually 1-2x/week.  -Singulair 10 mg -taking nightly.    Prior inhalers and/or respiratory treatments:  -Advair-dizziness    Sleep:  -Snoring and witnessed apnea by her . No prior testing for sleep apnea. Notes migraines always. Getting ~6-7hrs of sleep nightly. Notes she does not feel refreshed any day of the week. When she is off work, she gets 6-7 hrs still. Feels tired and exhausted all day long.      HPI (24):  Today in clinic, she is here to establish care.    Dyspnea: states that her breathing has been getting worse over last 8-10 months. Have been needing to use her inhaler more often.  Asthma - diagnosed while in her 20s. She was pregnant at the time and had significant dyspnea on minimal exertion. Symptoms apparently worsened post-partum. She used to run track in high school and used to

## 2024-05-03 ENCOUNTER — TELEPHONE (OUTPATIENT)
Dept: SLEEP MEDICINE | Facility: HOSPITAL | Age: 54
End: 2024-05-03

## 2024-05-06 ENCOUNTER — TELEPHONE (OUTPATIENT)
Dept: SLEEP MEDICINE | Facility: HOSPITAL | Age: 54
End: 2024-05-06

## 2024-05-07 ENCOUNTER — TELEPHONE (OUTPATIENT)
Dept: SLEEP MEDICINE | Facility: HOSPITAL | Age: 54
End: 2024-05-07

## 2024-07-14 ENCOUNTER — APPOINTMENT (OUTPATIENT)
Facility: HOSPITAL | Age: 54
End: 2024-07-14

## 2024-07-14 ENCOUNTER — HOSPITAL ENCOUNTER (EMERGENCY)
Facility: HOSPITAL | Age: 54
Discharge: HOME | End: 2024-07-14

## 2024-07-14 VITALS
HEIGHT: 67 IN | BODY MASS INDEX: 28.56 KG/M2 | RESPIRATION RATE: 18 BRPM | HEART RATE: 81 BPM | TEMPERATURE: 98.2 F | WEIGHT: 182 LBS | SYSTOLIC BLOOD PRESSURE: 169 MMHG | OXYGEN SATURATION: 100 % | DIASTOLIC BLOOD PRESSURE: 90 MMHG

## 2024-07-14 DIAGNOSIS — S91.332A PUNCTURE WOUND OF LEFT FOOT, INITIAL ENCOUNTER: Primary | ICD-10-CM

## 2024-07-14 DIAGNOSIS — G62.9 NEUROPATHY: ICD-10-CM

## 2024-07-14 PROCEDURE — 6370000000 HC RX 637 (ALT 250 FOR IP): Performed by: PHYSICIAN ASSISTANT

## 2024-07-14 PROCEDURE — 99283 EMERGENCY DEPT VISIT LOW MDM: CPT

## 2024-07-14 PROCEDURE — 73630 X-RAY EXAM OF FOOT: CPT

## 2024-07-14 RX ORDER — LEVOFLOXACIN 500 MG/1
500 TABLET, FILM COATED ORAL
Status: COMPLETED | OUTPATIENT
Start: 2024-07-14 | End: 2024-07-14

## 2024-07-14 RX ORDER — OXYCODONE HYDROCHLORIDE AND ACETAMINOPHEN 5; 325 MG/1; MG/1
1 TABLET ORAL
Status: COMPLETED | OUTPATIENT
Start: 2024-07-14 | End: 2024-07-14

## 2024-07-14 RX ORDER — LISINOPRIL 10 MG/1
10 TABLET ORAL
Status: COMPLETED | OUTPATIENT
Start: 2024-07-14 | End: 2024-07-14

## 2024-07-14 RX ORDER — GABAPENTIN 300 MG/1
300 CAPSULE ORAL 3 TIMES DAILY
Qty: 30 CAPSULE | Refills: 0 | Status: SHIPPED | OUTPATIENT
Start: 2024-07-14 | End: 2024-07-24

## 2024-07-14 RX ORDER — LEVOFLOXACIN 500 MG/1
500 TABLET, FILM COATED ORAL DAILY
Qty: 7 TABLET | Refills: 0 | Status: SHIPPED | OUTPATIENT
Start: 2024-07-14 | End: 2024-07-21

## 2024-07-14 RX ORDER — NAPROXEN 500 MG/1
500 TABLET ORAL 2 TIMES DAILY
Qty: 30 TABLET | Refills: 0 | Status: SHIPPED | OUTPATIENT
Start: 2024-07-14

## 2024-07-14 RX ADMIN — LISINOPRIL 10 MG: 10 TABLET ORAL at 21:55

## 2024-07-14 RX ADMIN — OXYCODONE HYDROCHLORIDE AND ACETAMINOPHEN 1 TABLET: 5; 325 TABLET ORAL at 22:13

## 2024-07-14 RX ADMIN — LEVOFLOXACIN 500 MG: 500 TABLET, FILM COATED ORAL at 21:55

## 2024-07-14 ASSESSMENT — PAIN SCALES - GENERAL: PAINLEVEL_OUTOF10: 10

## 2024-07-14 ASSESSMENT — LIFESTYLE VARIABLES
HOW MANY STANDARD DRINKS CONTAINING ALCOHOL DO YOU HAVE ON A TYPICAL DAY: PATIENT DOES NOT DRINK
HOW OFTEN DO YOU HAVE A DRINK CONTAINING ALCOHOL: NEVER

## 2024-07-14 ASSESSMENT — PAIN - FUNCTIONAL ASSESSMENT: PAIN_FUNCTIONAL_ASSESSMENT: 0-10

## 2024-07-15 DIAGNOSIS — E11.40 TYPE 2 DIABETES MELLITUS WITH DIABETIC NEUROPATHY, WITH LONG-TERM CURRENT USE OF INSULIN (HCC): ICD-10-CM

## 2024-07-15 DIAGNOSIS — Z79.4 TYPE 2 DIABETES MELLITUS WITH DIABETIC NEUROPATHY, WITH LONG-TERM CURRENT USE OF INSULIN (HCC): ICD-10-CM

## 2024-07-15 RX ORDER — FLASH GLUCOSE SENSOR
KIT MISCELLANEOUS
Qty: 2 EACH | Refills: 1 | OUTPATIENT
Start: 2024-07-15

## 2024-07-15 NOTE — ED TRIAGE NOTES
Pt states she stepped on about 7-8 tacs last Tuesday.  Pt did not notice until the next day when she noticed the tacks in her shoe.   Pt did not notice due to diabetic neuropathy .  States she used a foot bath with epsom salts,  and cleaned it with betadine,  pt now having swelling and pain from site.  States it looks like the puncture wounds are splitting

## 2024-07-15 NOTE — ED PROVIDER NOTES
History:   Procedure Laterality Date     SECTION      x 3    COLONOSCOPY      CORNEAL TRANSPLANT Bilateral 2021    Lens implants    HYSTERECTOMY (CERVIX STATUS UNKNOWN)      OVARY REMOVAL      PELVIC LAPAROSCOPY         Family History:  Family History   Problem Relation Age of Onset    Diabetes Father     Cancer Mother     Diabetes Mother     Dementia Mother        Social History:  Social History     Tobacco Use    Smoking status: Never    Smokeless tobacco: Never   Substance Use Topics    Alcohol use: Yes     Alcohol/week: 0.0 standard drinks of alcohol    Drug use: Never       Allergies:  Allergies   Allergen Reactions    Hydrocodone-Acetaminophen Shortness Of Breath    Sumatriptan Nausea And Vomiting         Review of Systems   Review of Systems   Constitutional:  Negative for chills and fever.   HENT:  Negative for congestion, rhinorrhea and sore throat.    Eyes:  Negative for discharge and redness.   Respiratory:  Negative for cough, shortness of breath, wheezing and stridor.    Cardiovascular:  Negative for chest pain.   Gastrointestinal:  Negative for abdominal pain, nausea and vomiting.   Genitourinary:  Negative for dysuria and frequency.   Musculoskeletal:  Positive for arthralgias. Negative for back pain and neck pain.   Skin:  Positive for wound. Negative for rash.   Neurological:  Negative for seizures, syncope and headaches.   All other systems reviewed and are negative.    All Other Systems Negative  Physical Exam     Vitals:    24 2042 24 2156   BP: (!) 169/90    Pulse: 81    Resp: 18    Temp: 98.2 °F (36.8 °C)    TempSrc: Oral    SpO2: 100%    Weight:  82.6 kg (182 lb)   Height:  1.702 m (5' 7\")     Physical Exam  Vitals and nursing note reviewed.   Constitutional:       General: She is not in acute distress.     Appearance: Normal appearance. She is not ill-appearing, toxic-appearing or diaphoretic.   HENT:      Head: Normocephalic and atraumatic.      Mouth/Throat:

## 2024-08-16 DIAGNOSIS — G62.9 NEUROPATHY: ICD-10-CM

## 2024-08-16 RX ORDER — GABAPENTIN 300 MG/1
CAPSULE ORAL
Qty: 90 CAPSULE | OUTPATIENT
Start: 2024-08-16

## 2024-08-22 DIAGNOSIS — I10 PRIMARY HYPERTENSION: ICD-10-CM

## 2024-08-22 RX ORDER — AMLODIPINE BESYLATE 2.5 MG/1
2.5 TABLET ORAL DAILY
Qty: 30 TABLET | Refills: 1 | Status: SHIPPED | OUTPATIENT
Start: 2024-08-22

## 2024-08-23 ENCOUNTER — OFFICE VISIT (OUTPATIENT)
Facility: CLINIC | Age: 54
End: 2024-08-23

## 2024-08-23 VITALS
DIASTOLIC BLOOD PRESSURE: 105 MMHG | BODY MASS INDEX: 28.09 KG/M2 | TEMPERATURE: 97.8 F | RESPIRATION RATE: 18 BRPM | HEART RATE: 79 BPM | OXYGEN SATURATION: 97 % | SYSTOLIC BLOOD PRESSURE: 178 MMHG | HEIGHT: 67 IN | WEIGHT: 179 LBS

## 2024-08-23 DIAGNOSIS — Z12.11 SCREEN FOR COLON CANCER: ICD-10-CM

## 2024-08-23 DIAGNOSIS — Z13.29 SCREENING FOR ENDOCRINE, METABOLIC AND IMMUNITY DISORDER: Primary | ICD-10-CM

## 2024-08-23 DIAGNOSIS — Z79.4 TYPE 2 DIABETES MELLITUS WITH DIABETIC NEUROPATHY, WITH LONG-TERM CURRENT USE OF INSULIN (HCC): ICD-10-CM

## 2024-08-23 DIAGNOSIS — K21.9 GASTROESOPHAGEAL REFLUX DISEASE, UNSPECIFIED WHETHER ESOPHAGITIS PRESENT: ICD-10-CM

## 2024-08-23 DIAGNOSIS — Z13.228 SCREENING FOR ENDOCRINE, METABOLIC AND IMMUNITY DISORDER: Primary | ICD-10-CM

## 2024-08-23 DIAGNOSIS — Z13.0 SCREENING FOR ENDOCRINE, METABOLIC AND IMMUNITY DISORDER: Primary | ICD-10-CM

## 2024-08-23 DIAGNOSIS — E55.9 VITAMIN D DEFICIENCY: ICD-10-CM

## 2024-08-23 DIAGNOSIS — E11.40 TYPE 2 DIABETES MELLITUS WITH DIABETIC NEUROPATHY, WITH LONG-TERM CURRENT USE OF INSULIN (HCC): ICD-10-CM

## 2024-08-23 DIAGNOSIS — Z12.31 ENCOUNTER FOR SCREENING MAMMOGRAM FOR MALIGNANT NEOPLASM OF BREAST: ICD-10-CM

## 2024-08-23 DIAGNOSIS — G62.9 NEUROPATHY: ICD-10-CM

## 2024-08-23 DIAGNOSIS — E03.9 HYPOTHYROIDISM, UNSPECIFIED TYPE: ICD-10-CM

## 2024-08-23 DIAGNOSIS — I10 PRIMARY HYPERTENSION: ICD-10-CM

## 2024-08-23 DIAGNOSIS — E78.5 HYPERLIPIDEMIA, UNSPECIFIED HYPERLIPIDEMIA TYPE: ICD-10-CM

## 2024-08-23 RX ORDER — GABAPENTIN 300 MG/1
300 CAPSULE ORAL 3 TIMES DAILY
Qty: 30 CAPSULE | Refills: 0 | Status: SHIPPED | OUTPATIENT
Start: 2024-08-23 | End: 2024-09-02

## 2024-08-23 RX ORDER — OMEPRAZOLE 20 MG/1
20 CAPSULE, DELAYED RELEASE ORAL
Qty: 30 CAPSULE | Refills: 0 | Status: SHIPPED | OUTPATIENT
Start: 2024-08-23

## 2024-08-23 RX ORDER — LOSARTAN POTASSIUM AND HYDROCHLOROTHIAZIDE 12.5; 1 MG/1; MG/1
1 TABLET ORAL DAILY
Qty: 90 TABLET | Refills: 1 | Status: SHIPPED | OUTPATIENT
Start: 2024-08-23

## 2024-08-23 SDOH — ECONOMIC STABILITY: FOOD INSECURITY: WITHIN THE PAST 12 MONTHS, YOU WORRIED THAT YOUR FOOD WOULD RUN OUT BEFORE YOU GOT MONEY TO BUY MORE.: NEVER TRUE

## 2024-08-23 SDOH — ECONOMIC STABILITY: INCOME INSECURITY: HOW HARD IS IT FOR YOU TO PAY FOR THE VERY BASICS LIKE FOOD, HOUSING, MEDICAL CARE, AND HEATING?: NOT HARD AT ALL

## 2024-08-23 SDOH — ECONOMIC STABILITY: FOOD INSECURITY: WITHIN THE PAST 12 MONTHS, THE FOOD YOU BOUGHT JUST DIDN'T LAST AND YOU DIDN'T HAVE MONEY TO GET MORE.: NEVER TRUE

## 2024-08-23 ASSESSMENT — ENCOUNTER SYMPTOMS
RESPIRATORY NEGATIVE: 1
EYES NEGATIVE: 1
ALLERGIC/IMMUNOLOGIC NEGATIVE: 1
GASTROINTESTINAL NEGATIVE: 1

## 2024-08-23 ASSESSMENT — PATIENT HEALTH QUESTIONNAIRE - PHQ9
SUM OF ALL RESPONSES TO PHQ QUESTIONS 1-9: 0
2. FEELING DOWN, DEPRESSED OR HOPELESS: NOT AT ALL
SUM OF ALL RESPONSES TO PHQ QUESTIONS 1-9: 0
SUM OF ALL RESPONSES TO PHQ9 QUESTIONS 1 & 2: 0
1. LITTLE INTEREST OR PLEASURE IN DOING THINGS: NOT AT ALL

## 2024-08-23 NOTE — ASSESSMENT & PLAN NOTE
Patient is suggested to keep a log of blood sugars at home and bring it next office visit.  Patient continues on glargine insulin and NovoLog.

## 2024-08-23 NOTE — PROGRESS NOTES
\"Have you been to the ER, urgent care clinic since your last visit?  Hospitalized since your last visit?\"    YES - When: approximately 1 months ago.  Where and Why: [facility] ER laceration on foot.    “Have you seen or consulted any other health care providers outside of [facility] since your last visit?”    NO    “Have you had a colorectal cancer screening such as a colonoscopy/FIT/Cologuard?    NO    No colonoscopy on file  No cologuard on file  No FIT/FOBT on file   No flexible sigmoidoscopy on file        Have you had a mammogram?”   NO    Date of last Mammogram: 10/29/2015

## 2024-08-23 NOTE — PROGRESS NOTES
Jerri Turner (: 1970) is a 54 y.o. female, here for evaluation of the following chief complaint(s):  Follow-up       ASSESSMENT/PLAN:  Below is the assessment and plan developed based on review of pertinent history, physical exam, labs, studies, and medications.    1. Screening for endocrine, metabolic and immunity disorder  -     CBC with Auto Differential; Future  -     Comprehensive Metabolic Panel; Future  -     Lipid Panel; Future  -     Hemoglobin A1C; Future  2. Vitamin D deficiency  -     Vitamin D 25 Hydroxy; Future  3. Hypothyroidism, unspecified type  Assessment & Plan:   Patient on 100 mcg of levothyroxine.  Orders:  -     TSH + Free T4 Panel; Future  4. Hyperlipidemia, unspecified hyperlipidemia type  Assessment & Plan:   Patient on Lipitor.  5. Type 2 diabetes mellitus with diabetic neuropathy, with long-term current use of insulin (HCC)  Assessment & Plan:   Patient is suggested to keep a log of blood sugars at home and bring it next office visit.  Patient continues on glargine insulin and NovoLog.  Orders:  -     Amb External Referral To Podiatry  6. Neuropathy  Assessment & Plan:   Stable on gabapentin.  Orders:  -     gabapentin (NEURONTIN) 300 MG capsule; Take 1 capsule by mouth 3 times daily for 10 days. Max Daily Amount: 900 mg, Disp-30 capsule, R-0Normal  7. Primary hypertension  Assessment & Plan:   Patient has been noncompliant with her medications.  To start patient on losartan hydrochlorothiazide 100/12.5 mg once a day.  To continue amlodipine 2.5 mg once a day.  Patient to keep a log of blood pressure at home and bring it next office visit.  Orders:  -     losartan-hydroCHLOROthiazide (HYZAAR) 100-12.5 MG per tablet; Take 1 tablet by mouth daily, Disp-90 tablet, R-1Normal  8. Encounter for screening mammogram for malignant neoplasm of breast  -     [facility] DIGITAL SCREEN BILATERAL; Future  9. Screen for colon cancer  -     Ambulatory referral to Gastroenterology  10.

## 2024-08-23 NOTE — ASSESSMENT & PLAN NOTE
Patient has been noncompliant with her medications.  To start patient on losartan hydrochlorothiazide 100/12.5 mg once a day.  To continue amlodipine 2.5 mg once a day.  Patient to keep a log of blood pressure at home and bring it next office visit.

## 2024-08-31 ENCOUNTER — HOSPITAL ENCOUNTER (EMERGENCY)
Facility: HOSPITAL | Age: 54
Discharge: HOME | End: 2024-08-31
Attending: EMERGENCY MEDICINE
Payer: COMMERCIAL

## 2024-08-31 VITALS
HEIGHT: 66 IN | RESPIRATION RATE: 14 BRPM | DIASTOLIC BLOOD PRESSURE: 101 MMHG | SYSTOLIC BLOOD PRESSURE: 143 MMHG | BODY MASS INDEX: 28.77 KG/M2 | OXYGEN SATURATION: 98 % | HEART RATE: 72 BPM | TEMPERATURE: 98.4 F | WEIGHT: 179 LBS

## 2024-08-31 DIAGNOSIS — N39.0 URINARY TRACT INFECTION WITHOUT HEMATURIA, SITE UNSPECIFIED: Primary | ICD-10-CM

## 2024-08-31 LAB
APPEARANCE UR: ABNORMAL
BACTERIA URNS QL MICRO: ABNORMAL /HPF
BILIRUB UR QL: NEGATIVE
COLOR UR: YELLOW
EPITH CASTS URNS QL MICRO: ABNORMAL /LPF (ref 0–5)
GLUCOSE UR STRIP.AUTO-MCNC: >1000 MG/DL
HGB UR QL STRIP: ABNORMAL
KETONES UR QL STRIP.AUTO: NEGATIVE MG/DL
LEUKOCYTE ESTERASE UR QL STRIP.AUTO: ABNORMAL
NITRITE UR QL STRIP.AUTO: NEGATIVE
PH UR STRIP: 5.5 (ref 5–8)
PROT UR STRIP-MCNC: ABNORMAL MG/DL
RBC #/AREA URNS HPF: ABNORMAL /HPF (ref 0–5)
SP GR UR REFRACTOMETRY: >1.03 (ref 1–1.03)
UROBILINOGEN UR QL STRIP.AUTO: 1 EU/DL (ref 0.2–1)
WBC URNS QL MICRO: ABNORMAL /HPF (ref 0–4)

## 2024-08-31 PROCEDURE — 81001 URINALYSIS AUTO W/SCOPE: CPT

## 2024-08-31 PROCEDURE — 99283 EMERGENCY DEPT VISIT LOW MDM: CPT

## 2024-08-31 PROCEDURE — 6370000000 HC RX 637 (ALT 250 FOR IP): Performed by: EMERGENCY MEDICINE

## 2024-08-31 RX ORDER — SULFAMETHOXAZOLE/TRIMETHOPRIM 800-160 MG
1 TABLET ORAL 2 TIMES DAILY
Qty: 20 TABLET | Refills: 0 | Status: SHIPPED | OUTPATIENT
Start: 2024-08-31 | End: 2024-09-10

## 2024-08-31 RX ORDER — IBUPROFEN 600 MG/1
600 TABLET, FILM COATED ORAL 3 TIMES DAILY PRN
Qty: 30 TABLET | Refills: 0 | Status: SHIPPED | OUTPATIENT
Start: 2024-08-31

## 2024-08-31 RX ORDER — SULFAMETHOXAZOLE/TRIMETHOPRIM 800-160 MG
1 TABLET ORAL 2 TIMES DAILY
Qty: 20 TABLET | Refills: 0 | Status: SHIPPED | OUTPATIENT
Start: 2024-08-31 | End: 2024-08-31

## 2024-08-31 RX ORDER — PHENAZOPYRIDINE HYDROCHLORIDE 100 MG/1
200 TABLET, FILM COATED ORAL
Status: COMPLETED | OUTPATIENT
Start: 2024-08-31 | End: 2024-08-31

## 2024-08-31 RX ORDER — PHENAZOPYRIDINE HYDROCHLORIDE 100 MG/1
200 TABLET, FILM COATED ORAL 3 TIMES DAILY PRN
Qty: 6 TABLET | Refills: 0 | Status: SHIPPED | OUTPATIENT
Start: 2024-08-31 | End: 2024-09-03

## 2024-08-31 RX ORDER — IBUPROFEN 100 MG/5ML
600 SUSPENSION, ORAL (FINAL DOSE FORM) ORAL
Status: DISCONTINUED | OUTPATIENT
Start: 2024-08-31 | End: 2024-08-31

## 2024-08-31 RX ORDER — PHENAZOPYRIDINE HYDROCHLORIDE 100 MG/1
200 TABLET, FILM COATED ORAL 3 TIMES DAILY PRN
Qty: 6 TABLET | Refills: 0 | Status: SHIPPED | OUTPATIENT
Start: 2024-08-31 | End: 2024-08-31

## 2024-08-31 RX ORDER — SULFAMETHOXAZOLE/TRIMETHOPRIM 800-160 MG
1 TABLET ORAL ONCE
Status: COMPLETED | OUTPATIENT
Start: 2024-08-31 | End: 2024-08-31

## 2024-08-31 RX ORDER — IBUPROFEN 600 MG/1
600 TABLET, FILM COATED ORAL 3 TIMES DAILY PRN
Qty: 30 TABLET | Refills: 0 | Status: SHIPPED | OUTPATIENT
Start: 2024-08-31 | End: 2024-08-31

## 2024-08-31 RX ADMIN — PHENAZOPYRIDINE 200 MG: 100 TABLET ORAL at 23:38

## 2024-08-31 RX ADMIN — SULFAMETHOXAZOLE AND TRIMETHOPRIM 1 TABLET: 800; 160 TABLET ORAL at 23:39

## 2024-08-31 ASSESSMENT — PAIN - FUNCTIONAL ASSESSMENT: PAIN_FUNCTIONAL_ASSESSMENT: 0-10

## 2024-08-31 ASSESSMENT — PAIN SCALES - GENERAL: PAINLEVEL_OUTOF10: 10

## 2024-09-01 NOTE — ED PROVIDER NOTES
MG/0.5ML SOPN    Inject 0.75 mg into the skin once a week    DULOXETINE (CYMBALTA) 20 MG EXTENDED RELEASE CAPSULE    Take 1 capsule by mouth daily    GABAPENTIN (NEURONTIN) 300 MG CAPSULE    Take 1 capsule by mouth 3 times daily for 10 days. Max Daily Amount: 900 mg    INSULIN ASPART (NOVOLOG) 100 UNIT/ML INJECTION PEN    15 units 3 times a day before breakfast, lunch and dinner and <150 BS/0 units, 151-200/2 units, 201-250/4 units, 251-300/6 units, 301-350/8 units, >351-400/10 units and call provider    INSULIN DETEMIR (LEVEMIR) 100 UNIT/ML INJECTION VIAL    ADMINISTER 44 UNITS UNDER THE SKIN TWICE DAILY    INSULIN PEN NEEDLE 31G X 6 MM MISC    1 each by Does not apply route 2 times daily    LEVOTHYROXINE (SYNTHROID) 100 MCG TABLET    Take 1 tablet by mouth Daily    LOSARTAN-HYDROCHLOROTHIAZIDE (HYZAAR) 100-12.5 MG PER TABLET    Take 1 tablet by mouth daily    MONTELUKAST (SINGULAIR) 10 MG TABLET    TAKE 1 TABLET BY MOUTH DAILY    NAPROXEN (NAPROSYN) 500 MG TABLET    Take 1 tablet by mouth 2 times daily    OMEPRAZOLE (PRILOSEC) 20 MG DELAYED RELEASE CAPSULE    Take 1 capsule by mouth every morning (before breakfast)       ALLERGIES     Hydrocodone-acetaminophen and Sumatriptan    FAMILY HISTORY       Family History   Problem Relation Age of Onset    Diabetes Father     Cancer Mother     Diabetes Mother     Dementia Mother           SOCIAL HISTORY       Social History     Socioeconomic History    Marital status:      Spouse name: None    Number of children: None    Years of education: None    Highest education level: None   Tobacco Use    Smoking status: Never    Smokeless tobacco: Never   Substance and Sexual Activity    Alcohol use: Not Currently    Drug use: Never     Social Determinants of Health     Financial Resource Strain: Low Risk  (8/23/2024)    Overall Financial Resource Strain (CARDIA)     Difficulty of Paying Living Expenses: Not hard at all   Food Insecurity: No Food Insecurity (8/23/2024)

## 2024-09-16 DIAGNOSIS — G62.9 NEUROPATHY: ICD-10-CM

## 2024-09-16 RX ORDER — GABAPENTIN 300 MG/1
CAPSULE ORAL
Qty: 30 CAPSULE | OUTPATIENT
Start: 2024-09-16

## 2024-09-16 RX ORDER — LISINOPRIL AND HYDROCHLOROTHIAZIDE 12.5; 2 MG/1; MG/1
1 TABLET ORAL 2 TIMES DAILY
Qty: 30 TABLET | Refills: 0 | OUTPATIENT
Start: 2024-09-16

## 2024-09-16 RX ORDER — GABAPENTIN 300 MG/1
CAPSULE ORAL
Qty: 90 CAPSULE | OUTPATIENT
Start: 2024-09-16

## 2024-09-27 ENCOUNTER — TELEPHONE (OUTPATIENT)
Facility: CLINIC | Age: 54
End: 2024-09-27

## 2024-09-27 DIAGNOSIS — N64.4 BREAST PAIN: Primary | ICD-10-CM

## 2024-09-27 NOTE — TELEPHONE ENCOUNTER
Pt stated that she needs a new order for breast ultrasound because her breast was sore    Pt stated she needs diagnostic     Pt requesting  a call back please

## 2024-09-30 NOTE — TELEPHONE ENCOUNTER
Patient has been advised that her order has been updated and she can call central scheduling for her appointment.

## 2024-10-02 ENCOUNTER — OFFICE VISIT (OUTPATIENT)
Facility: CLINIC | Age: 54
End: 2024-10-02

## 2024-10-02 ENCOUNTER — TELEPHONE (OUTPATIENT)
Facility: CLINIC | Age: 54
End: 2024-10-02

## 2024-10-02 ENCOUNTER — HOSPITAL ENCOUNTER (OUTPATIENT)
Facility: HOSPITAL | Age: 54
Discharge: HOME | End: 2024-10-05
Payer: COMMERCIAL

## 2024-10-02 VITALS
BODY MASS INDEX: 27.32 KG/M2 | WEIGHT: 170 LBS | OXYGEN SATURATION: 97 % | RESPIRATION RATE: 18 BRPM | SYSTOLIC BLOOD PRESSURE: 122 MMHG | HEIGHT: 66 IN | HEART RATE: 77 BPM | DIASTOLIC BLOOD PRESSURE: 88 MMHG | TEMPERATURE: 98.1 F

## 2024-10-02 DIAGNOSIS — K21.9 GASTROESOPHAGEAL REFLUX DISEASE, UNSPECIFIED WHETHER ESOPHAGITIS PRESENT: ICD-10-CM

## 2024-10-02 DIAGNOSIS — I10 PRIMARY HYPERTENSION: ICD-10-CM

## 2024-10-02 DIAGNOSIS — E11.69 TYPE 2 DIABETES MELLITUS WITH OTHER SPECIFIED COMPLICATION, WITH LONG-TERM CURRENT USE OF INSULIN (HCC): ICD-10-CM

## 2024-10-02 DIAGNOSIS — E03.9 HYPOTHYROIDISM, UNSPECIFIED TYPE: ICD-10-CM

## 2024-10-02 DIAGNOSIS — Z13.228 SCREENING FOR ENDOCRINE, METABOLIC AND IMMUNITY DISORDER: ICD-10-CM

## 2024-10-02 DIAGNOSIS — E55.9 VITAMIN D DEFICIENCY: ICD-10-CM

## 2024-10-02 DIAGNOSIS — Z12.11 SCREEN FOR COLON CANCER: ICD-10-CM

## 2024-10-02 DIAGNOSIS — R52 TOTAL BODY PAIN: ICD-10-CM

## 2024-10-02 DIAGNOSIS — Z13.29 SCREENING FOR ENDOCRINE, METABOLIC AND IMMUNITY DISORDER: ICD-10-CM

## 2024-10-02 DIAGNOSIS — E11.40 TYPE 2 DIABETES MELLITUS WITH DIABETIC NEUROPATHY, WITH LONG-TERM CURRENT USE OF INSULIN (HCC): Primary | ICD-10-CM

## 2024-10-02 DIAGNOSIS — Z79.4 TYPE 2 DIABETES MELLITUS WITH OTHER SPECIFIED COMPLICATION, WITH LONG-TERM CURRENT USE OF INSULIN (HCC): ICD-10-CM

## 2024-10-02 DIAGNOSIS — N64.4 BREAST PAIN: ICD-10-CM

## 2024-10-02 DIAGNOSIS — Z13.0 SCREENING FOR ENDOCRINE, METABOLIC AND IMMUNITY DISORDER: ICD-10-CM

## 2024-10-02 DIAGNOSIS — E78.5 HYPERLIPIDEMIA, UNSPECIFIED HYPERLIPIDEMIA TYPE: ICD-10-CM

## 2024-10-02 DIAGNOSIS — Z79.4 TYPE 2 DIABETES MELLITUS WITH DIABETIC NEUROPATHY, WITH LONG-TERM CURRENT USE OF INSULIN (HCC): Primary | ICD-10-CM

## 2024-10-02 LAB
25(OH)D3 SERPL-MCNC: 21 NG/ML (ref 30–100)
ALBUMIN SERPL-MCNC: 3.8 G/DL (ref 3.4–5)
ALBUMIN/GLOB SERPL: 1.1 (ref 0.8–1.7)
ALP SERPL-CCNC: 129 U/L (ref 45–117)
ALT SERPL-CCNC: 33 U/L (ref 13–56)
ANION GAP SERPL CALC-SCNC: 7 MMOL/L (ref 3–18)
AST SERPL-CCNC: 39 U/L (ref 10–38)
BASOPHILS # BLD: 0 K/UL (ref 0–0.1)
BASOPHILS NFR BLD: 1 % (ref 0–2)
BILIRUB SERPL-MCNC: 0.9 MG/DL (ref 0.2–1)
BUN SERPL-MCNC: 14 MG/DL (ref 7–18)
BUN/CREAT SERPL: 12 (ref 12–20)
CALCIUM SERPL-MCNC: 9.2 MG/DL (ref 8.5–10.1)
CHLORIDE SERPL-SCNC: 101 MMOL/L (ref 100–111)
CHOLEST SERPL-MCNC: 199 MG/DL
CO2 SERPL-SCNC: 26 MMOL/L (ref 21–32)
CREAT SERPL-MCNC: 1.19 MG/DL (ref 0.6–1.3)
DIFFERENTIAL METHOD BLD: ABNORMAL
EOSINOPHIL # BLD: 0.2 K/UL (ref 0–0.4)
EOSINOPHIL NFR BLD: 3 % (ref 0–5)
ERYTHROCYTE [DISTWIDTH] IN BLOOD BY AUTOMATED COUNT: 12.5 % (ref 11.6–14.5)
EST. AVERAGE GLUCOSE BLD GHB EST-MCNC: ABNORMAL MG/DL
GLOBULIN SER CALC-MCNC: 3.4 G/DL (ref 2–4)
GLUCOSE SERPL-MCNC: 550 MG/DL (ref 74–99)
HBA1C MFR BLD: >14 % (ref 4.2–5.6)
HCT VFR BLD AUTO: 39.7 % (ref 35–45)
HDLC SERPL-MCNC: 58 MG/DL (ref 40–60)
HDLC SERPL: 3.4 (ref 0–5)
HGB BLD-MCNC: 12.4 G/DL (ref 12–16)
IMM GRANULOCYTES # BLD AUTO: 0 K/UL (ref 0–0.04)
IMM GRANULOCYTES NFR BLD AUTO: 0 % (ref 0–0.5)
LDLC SERPL CALC-MCNC: 90.4 MG/DL (ref 0–100)
LIPID PANEL: ABNORMAL
LYMPHOCYTES # BLD: 2.2 K/UL (ref 0.9–3.6)
LYMPHOCYTES NFR BLD: 38 % (ref 21–52)
MCH RBC QN AUTO: 25.4 PG (ref 24–34)
MCHC RBC AUTO-ENTMCNC: 31.2 G/DL (ref 31–37)
MCV RBC AUTO: 81.4 FL (ref 78–100)
MONOCYTES # BLD: 0.4 K/UL (ref 0.05–1.2)
MONOCYTES NFR BLD: 7 % (ref 3–10)
NEUTS SEG # BLD: 3 K/UL (ref 1.8–8)
NEUTS SEG NFR BLD: 51 % (ref 40–73)
NRBC # BLD: 0 K/UL (ref 0–0.01)
NRBC BLD-RTO: 0 PER 100 WBC
PLATELET # BLD AUTO: 208 K/UL (ref 135–420)
PMV BLD AUTO: 12.2 FL (ref 9.2–11.8)
POTASSIUM SERPL-SCNC: 4.4 MMOL/L (ref 3.5–5.5)
PROT SERPL-MCNC: 7.2 G/DL (ref 6.4–8.2)
RBC # BLD AUTO: 4.88 M/UL (ref 4.2–5.3)
SODIUM SERPL-SCNC: 134 MMOL/L (ref 136–145)
T4 FREE SERPL-MCNC: 1.1 NG/DL (ref 0.7–1.5)
TRIGL SERPL-MCNC: 253 MG/DL
TSH SERPL DL<=0.05 MIU/L-ACNC: 1.09 UIU/ML (ref 0.36–3.74)
VLDLC SERPL CALC-MCNC: 50.6 MG/DL
WBC # BLD AUTO: 5.9 K/UL (ref 4.6–13.2)

## 2024-10-02 PROCEDURE — 84439 ASSAY OF FREE THYROXINE: CPT

## 2024-10-02 PROCEDURE — 80053 COMPREHEN METABOLIC PANEL: CPT

## 2024-10-02 PROCEDURE — 83036 HEMOGLOBIN GLYCOSYLATED A1C: CPT

## 2024-10-02 PROCEDURE — 36415 COLL VENOUS BLD VENIPUNCTURE: CPT

## 2024-10-02 PROCEDURE — 80061 LIPID PANEL: CPT

## 2024-10-02 PROCEDURE — 82306 VITAMIN D 25 HYDROXY: CPT

## 2024-10-02 PROCEDURE — 85025 COMPLETE CBC W/AUTO DIFF WBC: CPT

## 2024-10-02 PROCEDURE — 84443 ASSAY THYROID STIM HORMONE: CPT

## 2024-10-02 SDOH — ECONOMIC STABILITY: FOOD INSECURITY: WITHIN THE PAST 12 MONTHS, THE FOOD YOU BOUGHT JUST DIDN'T LAST AND YOU DIDN'T HAVE MONEY TO GET MORE.: NEVER TRUE

## 2024-10-02 SDOH — ECONOMIC STABILITY: INCOME INSECURITY: HOW HARD IS IT FOR YOU TO PAY FOR THE VERY BASICS LIKE FOOD, HOUSING, MEDICAL CARE, AND HEATING?: NOT HARD AT ALL

## 2024-10-02 SDOH — ECONOMIC STABILITY: FOOD INSECURITY: WITHIN THE PAST 12 MONTHS, YOU WORRIED THAT YOUR FOOD WOULD RUN OUT BEFORE YOU GOT MONEY TO BUY MORE.: NEVER TRUE

## 2024-10-02 ASSESSMENT — PATIENT HEALTH QUESTIONNAIRE - PHQ9
SUM OF ALL RESPONSES TO PHQ9 QUESTIONS 1 & 2: 0
SUM OF ALL RESPONSES TO PHQ QUESTIONS 1-9: 0
SUM OF ALL RESPONSES TO PHQ QUESTIONS 1-9: 0
1. LITTLE INTEREST OR PLEASURE IN DOING THINGS: NOT AT ALL
2. FEELING DOWN, DEPRESSED OR HOPELESS: NOT AT ALL
SUM OF ALL RESPONSES TO PHQ QUESTIONS 1-9: 0
SUM OF ALL RESPONSES TO PHQ QUESTIONS 1-9: 0

## 2024-10-02 NOTE — ASSESSMENT & PLAN NOTE
Patient had not been using Trulicity for last 1 month, patient changed insurance.  Patient has not been taking insulin for last 3 days because of unavailability at the pharmacy (as per patient).  Patient has a history of noncompliance.    Patient has not got the labs done yet.    Patient followed with ophthalmology for diabetic retinal exam.  Patient also followed with podiatrist for diabetic foot exam.  Patient is sent for physical therapy for foot pain

## 2024-10-02 NOTE — PROGRESS NOTES
\"Have you been to the ER, urgent care clinic since your last visit?  Hospitalized since your last visit?\"    NO    “Have you seen or consulted any other health care providers outside of [facility] since your last visit?”    NO    “Have you had a colorectal cancer screening such as a colonoscopy/FIT/Cologuard?    NO    No colonoscopy on file  No cologuard on file  No FIT/FOBT on file   No flexible sigmoidoscopy on file        Have you had a mammogram?”   NO    Date of last Mammogram: 10/29/2015        \"Have you been to the ER, urgent care clinic since your last visit?  Hospitalized since your last visit?\"    NO    “Have you seen or consulted any other health care providers outside of [facility] since your last visit?”    NO    “Have you had a colorectal cancer screening such as a colonoscopy/FIT/Cologuard?    NO    No colonoscopy on file  No cologuard on file  No FIT/FOBT on file   No flexible sigmoidoscopy on file        Have you had a mammogram?”   NO    Date of last Mammogram: 10/29/2015          
  Cardiovascular:      Rate and Rhythm: Normal rate and regular rhythm.      Heart sounds: Normal heart sounds.   Pulmonary:      Breath sounds: Normal breath sounds.   Abdominal:      General: Abdomen is flat.      Palpations: Abdomen is soft.   Musculoskeletal:         General: Normal range of motion.      Cervical back: Normal range of motion and neck supple.   Skin:     General: Skin is warm.   Neurological:      General: No focal deficit present.      Mental Status: She is alert. Mental status is at baseline.         We discussed the diagnosis, risks and benefits of medications    Disclaimer:    The patient understands our medical plan.  Alternatives have been explained and offered.  The risks, benefits and significant side effects of all medications have been reviewed. Anticipated time course and progression of condition reviewed. All questions have been addressed.  She is encouraged to employ the information provided in the after visit summary, which was reviewed.      Where appropriate, she is instructed to call the clinic if she has not been notified either by phone or through [facility] with the results of her tests or with an appointment plan for any referrals within 1 week(s).  No news is not good news; it's no news. The patient  is to call if her condition worsens or fails to improve or if significant side effects are experienced.     Aspects of this note may have been generated using voice recognition software. Despite editing, there may be unrecognized errors.                 An electronic signature was used to authenticate this note.  -- Jayme Choudhury MD

## 2024-10-02 NOTE — TELEPHONE ENCOUNTER
Received called from lab regarding abnormal lab report, blood sugar 553.  Called patient and informed about the lab report and suggested her to kindly start taking insulin as prescribed.  Patient had not been using insulin for last few days.  Patient expressed understanding.  Patient is also suggested to keep a log of blood sugars before breakfast and before dinner.

## 2024-10-03 ENCOUNTER — HOSPITAL ENCOUNTER (EMERGENCY)
Facility: HOSPITAL | Age: 54
Discharge: HOME | End: 2024-10-03
Attending: EMERGENCY MEDICINE
Payer: COMMERCIAL

## 2024-10-03 ENCOUNTER — TELEPHONE (OUTPATIENT)
Facility: CLINIC | Age: 54
End: 2024-10-03

## 2024-10-03 VITALS
SYSTOLIC BLOOD PRESSURE: 142 MMHG | BODY MASS INDEX: 27.32 KG/M2 | HEIGHT: 66 IN | OXYGEN SATURATION: 100 % | WEIGHT: 170 LBS | HEART RATE: 80 BPM | TEMPERATURE: 98.5 F | DIASTOLIC BLOOD PRESSURE: 85 MMHG | RESPIRATION RATE: 16 BRPM

## 2024-10-03 DIAGNOSIS — Z79.4 TYPE 2 DIABETES MELLITUS WITH HYPERGLYCEMIA, WITH LONG-TERM CURRENT USE OF INSULIN (HCC): Primary | ICD-10-CM

## 2024-10-03 DIAGNOSIS — E11.69 TYPE 2 DIABETES MELLITUS WITH OTHER SPECIFIED COMPLICATION, WITH LONG-TERM CURRENT USE OF INSULIN (HCC): ICD-10-CM

## 2024-10-03 DIAGNOSIS — E11.65 TYPE 2 DIABETES MELLITUS WITH HYPERGLYCEMIA, WITH LONG-TERM CURRENT USE OF INSULIN (HCC): Primary | ICD-10-CM

## 2024-10-03 DIAGNOSIS — I10 ESSENTIAL HYPERTENSION: ICD-10-CM

## 2024-10-03 DIAGNOSIS — Z79.4 TYPE 2 DIABETES MELLITUS WITH OTHER SPECIFIED COMPLICATION, WITH LONG-TERM CURRENT USE OF INSULIN (HCC): ICD-10-CM

## 2024-10-03 LAB
ALBUMIN SERPL-MCNC: 3.7 G/DL (ref 3.4–5)
ALBUMIN/GLOB SERPL: 1 (ref 0.8–1.7)
ALP SERPL-CCNC: 137 U/L (ref 45–117)
ALT SERPL-CCNC: 29 U/L (ref 13–56)
ANION GAP SERPL CALC-SCNC: 9 MMOL/L (ref 3–18)
APPEARANCE UR: CLEAR
AST SERPL-CCNC: 41 U/L (ref 10–38)
BASOPHILS # BLD: 0 K/UL (ref 0–0.1)
BASOPHILS NFR BLD: 1 % (ref 0–2)
BILIRUB SERPL-MCNC: 0.6 MG/DL (ref 0.2–1)
BILIRUB UR QL: NEGATIVE
BUN SERPL-MCNC: 17 MG/DL (ref 7–18)
BUN/CREAT SERPL: 13 (ref 12–20)
CALCIUM SERPL-MCNC: 9 MG/DL (ref 8.5–10.1)
CHLORIDE SERPL-SCNC: 104 MMOL/L (ref 100–111)
CO2 SERPL-SCNC: 25 MMOL/L (ref 21–32)
COLOR UR: YELLOW
CREAT SERPL-MCNC: 1.31 MG/DL (ref 0.6–1.3)
DIFFERENTIAL METHOD BLD: NORMAL
EOSINOPHIL # BLD: 0.1 K/UL (ref 0–0.4)
EOSINOPHIL NFR BLD: 3 % (ref 0–5)
ERYTHROCYTE [DISTWIDTH] IN BLOOD BY AUTOMATED COUNT: 12.5 % (ref 11.6–14.5)
GLOBULIN SER CALC-MCNC: 3.7 G/DL (ref 2–4)
GLUCOSE BLD STRIP.AUTO-MCNC: 248 MG/DL (ref 70–110)
GLUCOSE BLD STRIP.AUTO-MCNC: 275 MG/DL (ref 70–110)
GLUCOSE BLD STRIP.AUTO-MCNC: 382 MG/DL (ref 70–110)
GLUCOSE SERPL-MCNC: 414 MG/DL (ref 74–99)
GLUCOSE UR STRIP.AUTO-MCNC: >1000 MG/DL
HCT VFR BLD AUTO: 40.3 % (ref 35–45)
HGB BLD-MCNC: 12.7 G/DL (ref 12–16)
HGB UR QL STRIP: NEGATIVE
IMM GRANULOCYTES # BLD AUTO: 0 K/UL (ref 0–0.04)
IMM GRANULOCYTES NFR BLD AUTO: 0 % (ref 0–0.5)
KETONES UR QL STRIP.AUTO: 15 MG/DL
LEUKOCYTE ESTERASE UR QL STRIP.AUTO: NEGATIVE
LYMPHOCYTES # BLD: 1.8 K/UL (ref 0.9–3.6)
LYMPHOCYTES NFR BLD: 35 % (ref 21–52)
MCH RBC QN AUTO: 25 PG (ref 24–34)
MCHC RBC AUTO-ENTMCNC: 31.5 G/DL (ref 31–37)
MCV RBC AUTO: 79.3 FL (ref 78–100)
MONOCYTES # BLD: 0.3 K/UL (ref 0.05–1.2)
MONOCYTES NFR BLD: 6 % (ref 3–10)
NEUTS SEG # BLD: 2.9 K/UL (ref 1.8–8)
NEUTS SEG NFR BLD: 56 % (ref 40–73)
NITRITE UR QL STRIP.AUTO: NEGATIVE
NRBC # BLD: 0 K/UL (ref 0–0.01)
NRBC BLD-RTO: 0 PER 100 WBC
PH UR STRIP: 5.5 (ref 5–8)
PLATELET # BLD AUTO: 202 K/UL (ref 135–420)
PMV BLD AUTO: 11.2 FL (ref 9.2–11.8)
POTASSIUM SERPL-SCNC: 4.3 MMOL/L (ref 3.5–5.5)
PROT SERPL-MCNC: 7.4 G/DL (ref 6.4–8.2)
PROT UR STRIP-MCNC: NEGATIVE MG/DL
RBC # BLD AUTO: 5.08 M/UL (ref 4.2–5.3)
SODIUM SERPL-SCNC: 138 MMOL/L (ref 136–145)
SP GR UR REFRACTOMETRY: >1.03 (ref 1–1.03)
TSH SERPL DL<=0.05 MIU/L-ACNC: 1.83 UIU/ML (ref 0.36–3.74)
UROBILINOGEN UR QL STRIP.AUTO: 1 EU/DL (ref 0.2–1)
WBC # BLD AUTO: 5.1 K/UL (ref 4.6–13.2)

## 2024-10-03 PROCEDURE — 82962 GLUCOSE BLOOD TEST: CPT

## 2024-10-03 PROCEDURE — 6370000000 HC RX 637 (ALT 250 FOR IP): Performed by: EMERGENCY MEDICINE

## 2024-10-03 PROCEDURE — 96360 HYDRATION IV INFUSION INIT: CPT

## 2024-10-03 PROCEDURE — 80053 COMPREHEN METABOLIC PANEL: CPT

## 2024-10-03 PROCEDURE — 96361 HYDRATE IV INFUSION ADD-ON: CPT

## 2024-10-03 PROCEDURE — 2580000003 HC RX 258: Performed by: EMERGENCY MEDICINE

## 2024-10-03 PROCEDURE — 85025 COMPLETE CBC W/AUTO DIFF WBC: CPT

## 2024-10-03 PROCEDURE — 81003 URINALYSIS AUTO W/O SCOPE: CPT

## 2024-10-03 PROCEDURE — 99284 EMERGENCY DEPT VISIT MOD MDM: CPT

## 2024-10-03 PROCEDURE — 84443 ASSAY THYROID STIM HORMONE: CPT

## 2024-10-03 RX ORDER — 0.9 % SODIUM CHLORIDE 0.9 %
1000 INTRAVENOUS SOLUTION INTRAVENOUS ONCE
Status: COMPLETED | OUTPATIENT
Start: 2024-10-03 | End: 2024-10-03

## 2024-10-03 RX ORDER — INSULIN ASPART 100 [IU]/ML
INJECTION, SOLUTION INTRAVENOUS; SUBCUTANEOUS
Qty: 15 ML | Refills: 0 | Status: SHIPPED | OUTPATIENT
Start: 2024-10-03

## 2024-10-03 RX ADMIN — INSULIN HUMAN 16 UNITS: 100 INJECTION, SOLUTION PARENTERAL at 11:30

## 2024-10-03 RX ADMIN — INSULIN HUMAN 10 UNITS: 100 INJECTION, SOLUTION PARENTERAL at 13:16

## 2024-10-03 RX ADMIN — SODIUM CHLORIDE 1000 ML: 9 INJECTION, SOLUTION INTRAVENOUS at 10:18

## 2024-10-03 ASSESSMENT — PAIN SCALES - GENERAL
PAINLEVEL_OUTOF10: 7
PAINLEVEL_OUTOF10: 10

## 2024-10-03 ASSESSMENT — PAIN - FUNCTIONAL ASSESSMENT
PAIN_FUNCTIONAL_ASSESSMENT: 0-10
PAIN_FUNCTIONAL_ASSESSMENT: 0-10

## 2024-10-03 NOTE — TELEPHONE ENCOUNTER
Patient was advised this morning and was on her way to the ER not feeling well. I will follow in Epic.

## 2024-10-03 NOTE — TELEPHONE ENCOUNTER
----- Message from Dr. Jayme Choudhury MD sent at 10/3/2024  7:22 AM EDT -----  Please inform the labs show some irregularities.  The patient to make an appointment for virtual visit to discuss labs as soon as possible.  Thanks

## 2024-10-03 NOTE — ED TRIAGE NOTES
Patient arrived to ER with complaints of continued elevated blood sugar. Patient states her insurance changed and her insulin was no longer covered, no insulin for 3 days . Patient is out of levemir and novolog. Blood glucose 382 mg/dL.

## 2024-10-03 NOTE — TELEPHONE ENCOUNTER
I spoke with the patient and she was told by her pharmacy , [facility] [address],  that they do not have Levemir or Novolog in stock . Patient is going to find out who has it available in order for it to be resent. Patient will let us know where to send her medications.

## 2024-10-03 NOTE — ED PROVIDER NOTES
[facility] EMERGENCY DEPT  EMERGENCY DEPARTMENT ENCOUNTER    Patient Name: Jerri Turner  MRN: [id number]  YOB: 1970  Provider: Paxton Swain DO  PCP: Jayme Choudhury MD   Time/Date of evaluation: 10:34 AM EDT on 10/3/24    History of Presenting Illness     History Provided by: Patient  History is limited by: Nothing     HISTORY:   Jerri Turner is a 54 y.o. female with history of insulin-dependent diabetes, hypertension, diabetic neuropathy, asthma, hypothyroidism presents to the emergency room with a chief complaint of elevated blood sugar.  Patient states that she ran out of her insulin medication approximately 4 days ago..  She states that she went to her primary care provider yesterday and had lab work performed and also her prescription for insulin refill.  She states she was contacted by her PCPs office and told to come to the ED because her blood sugar was greater than 550.  She reports polyuria, and polydipsia.  Patient denies any fever, chills, chest pain, shortness of breath, headache, flank pain, dysuria, hematuria, vaginal discharge, vaginal bleeding, melena, constipation, bright red rectal bleeding, rash, joint pain, joint swelling.    Nursing Notes were all reviewed and agreed with or any disagreements were addressed in the HPI.    Past History     PAST MEDICAL HISTORY:  Past Medical History:   Diagnosis Date    Adverse effect of anesthesia     Slow to awaken    Asthma     Bronchitis     Cataract     Diabetes (HCC)     IDDM    Endocrine disease     hyperthyroidism    Endometriosis     Hypertension     Irregular bleeding     Migraine     Pelvic pain     Stroke (HCC)     No weakness    Thyroid disease     hypothyroid cc       PAST SURGICAL HISTORY:  Past Surgical History:   Procedure Laterality Date     SECTION      x 3    COLONOSCOPY      CORNEAL TRANSPLANT Bilateral 2021    Lens implants    HYSTERECTOMY (CERVIX STATUS UNKNOWN)      OVARY REMOVAL      PELVIC LAPAROSCOPY    Diabetes, hypertension, hypothyroidism    Social Determinants affecting Dx or Tx: None    Records Reviewed (source and summary of external notes): Nursing Notes, Old Medical Records, Previous Radiology Studies, and Previous Laboratory Studies    @[email or web address]@    Critical Care Time: None    SCREENING TOOLS:  None    CLINICAL MANAGEMENT TOOLS:  Not Applicable    Positive and negative test results were discussed. My clinical assessment and recommendations were discussed. They agree with the plan of discharge. Return precautions were discussed. All questions were answered and they are in agreement with plan.    10:34 AM Upon re-evaluation the patient's symptoms have improved. Pt has non-toxic appearance and condition is stable for discharge. She was informed of her results, instructed to f/u with her  PCP and return to the ED upon worsening of symptoms. All questions and concerns were addressed.    Is this patient to be included in the SEP-1 core measure? No Exclusion criteria - the patient is NOT to be included for SEP-1 Core Measure due to: Infection is not suspected    MEDICATIONS ADMINISTERED IN THE ED:  Medications   sodium chloride 0.9 % bolus 1,000 mL (1,000 mLs IntraVENous New Bag 10/3/24 1018)            None    Critical Care: None    Diagnosis and Disposition   Diagnosis: No diagnosis found.      Disposition:    DISPOSITION      Home or Self Care     [unfilled]      Dragon Disclaimer     Please note that this dictation was completed with [employer], the computer voice recognition software. Quite often unanticipated grammatical, syntax, homophones, and other interpretive errors are inadvertently transcribed by the computer software. Please disregard these errors. Please excuse any errors that have escaped final proofreading.      I Paxton Swain DO am the primary clinician of record.  Paxton Swain DO  (Electronically signed)            Paxton Swain DO  10/03/24 8615

## 2024-10-03 NOTE — TELEPHONE ENCOUNTER
I spoke with the patient and she is on her way to [facility] . Patient's pharmacy did not have her medications again yesterday and she feels bad. I will follow on [facility].

## 2024-10-03 NOTE — TELEPHONE ENCOUNTER
Patient needs refill for:  insulin detemir (LEVEMIR) 100 UNIT/ML injection vial      Urgent, patient needs medication.     [facility] #[id number] [place] [id number] - P [phone] - F [phone]

## 2024-10-03 NOTE — TELEPHONE ENCOUNTER
Pt called requesting a c/b pt stated that she's in the hospital and her sugar has went down to 275 pt request a c/b in regards of her pump.      Please advise

## 2024-10-03 NOTE — TELEPHONE ENCOUNTER
Patient needs refill for:  insulin detemir (LEVEMIR) 100 UNIT/ML injection vial     Urgent, patient needs medication.    [facility] #[id number] [place] [id number] - P [phone] - F [phone]

## 2024-10-04 RX ORDER — INSULIN ASPART 100 [IU]/ML
INJECTION, SOLUTION INTRAVENOUS; SUBCUTANEOUS
Qty: 15 ML | OUTPATIENT
Start: 2024-10-04

## 2024-10-04 RX ORDER — INSULIN GLARGINE 100 [IU]/ML
INJECTION, SOLUTION SUBCUTANEOUS
Qty: 5 ADJUSTABLE DOSE PRE-FILLED PEN SYRINGE | Refills: 3 | Status: SHIPPED | OUTPATIENT
Start: 2024-10-04

## 2024-10-04 NOTE — TELEPHONE ENCOUNTER
Pt called stating that her insurance will not cover Levemir but her insurance will cover Lantus Insulin pt stated that she need it as soon as possible because she has ran out.      Please advise

## 2024-10-04 NOTE — TELEPHONE ENCOUNTER
Pt called stating levemir is not covered by her insurance .   Pt said insurance will cover Lantus she is asking for a new prescription for that

## 2024-10-13 DIAGNOSIS — Z79.4 TYPE 2 DIABETES MELLITUS WITH OTHER SPECIFIED COMPLICATION, WITH LONG-TERM CURRENT USE OF INSULIN (HCC): ICD-10-CM

## 2024-10-13 DIAGNOSIS — E11.69 TYPE 2 DIABETES MELLITUS WITH OTHER SPECIFIED COMPLICATION, WITH LONG-TERM CURRENT USE OF INSULIN (HCC): ICD-10-CM

## 2024-10-14 RX ORDER — INSULIN DETEMIR 100 [IU]/ML
INJECTION, SOLUTION SUBCUTANEOUS
Qty: 20 ML | Refills: 0 | OUTPATIENT
Start: 2024-10-14

## 2024-10-23 DIAGNOSIS — K21.9 GASTROESOPHAGEAL REFLUX DISEASE, UNSPECIFIED WHETHER ESOPHAGITIS PRESENT: ICD-10-CM

## 2024-10-23 DIAGNOSIS — E78.5 HYPERLIPIDEMIA, UNSPECIFIED HYPERLIPIDEMIA TYPE: ICD-10-CM

## 2024-10-24 RX ORDER — ATORVASTATIN CALCIUM 80 MG/1
80 TABLET, FILM COATED ORAL DAILY
Qty: 30 TABLET | Refills: 1 | Status: SHIPPED | OUTPATIENT
Start: 2024-10-24

## 2024-10-24 NOTE — TELEPHONE ENCOUNTER
PCP: Jayme Choudhury MD    LAST OFFICE VISIT: 10/02/2024    LAST REFILL PER CHART:  Medication:omeprazole (PRILOSEC) 20 MG delayed release capsule   Ordered On:08/23/2024  Instructions:Take 1 capsule by mouth every morning (before breakfast)   Dispense:30 capsules  Refills:0      LAST REFILL PER CHART:  Medication:atorvastatin (LIPITOR) 80 MG tablet   Ordered On:03/13/2024  Instructions:Take 1 tablet by mouth daily   Dispense:30 tablets  Refills:1      Future Appointments   Date Time Provider Department Center   1/8/2025  2:40 PM Jayme Choudhury MD [facility] DEP

## 2024-11-01 PROBLEM — Z12.11 SCREEN FOR COLON CANCER: Status: RESOLVED | Noted: 2023-11-20 | Resolved: 2024-11-01

## 2024-11-25 ENCOUNTER — TELEPHONE (OUTPATIENT)
Facility: CLINIC | Age: 54
End: 2024-11-25

## 2024-11-25 DIAGNOSIS — G62.9 NEUROPATHY: ICD-10-CM

## 2024-11-25 RX ORDER — GABAPENTIN 300 MG/1
300 CAPSULE ORAL 3 TIMES DAILY
Qty: 90 CAPSULE | Refills: 0 | Status: SHIPPED | OUTPATIENT
Start: 2024-11-25 | End: 2024-12-25

## 2024-11-25 NOTE — TELEPHONE ENCOUNTER
Patient needs a refill on Gabapentin. [facility] [address]. [place].

## 2024-12-12 DIAGNOSIS — K21.9 GASTROESOPHAGEAL REFLUX DISEASE, UNSPECIFIED WHETHER ESOPHAGITIS PRESENT: ICD-10-CM

## 2024-12-12 NOTE — TELEPHONE ENCOUNTER
PCP: Jayme Choudhury MD    LAST OFFICE VISIT: 10/02/2024    LAST REFILL PER CHART:  Medication:omeprazole (PRILOSEC) 20 MG delayed release capsule   Ordered On:10/24/2024  Instructions:TAKE 1 CAPSULE BY MOUTH EVERY MORNING BEFORE BREAKFAST,   Dispense:30 capsules  Refills:0      Future Appointments   Date Time Provider Department Center   1/8/2025  2:40 PM Jayme Choudhury MD [facility] DEP

## 2025-01-10 ENCOUNTER — OFFICE VISIT (OUTPATIENT)
Facility: CLINIC | Age: 55
End: 2025-01-10
Payer: COMMERCIAL

## 2025-01-10 VITALS
OXYGEN SATURATION: 98 % | DIASTOLIC BLOOD PRESSURE: 80 MMHG | BODY MASS INDEX: 28.28 KG/M2 | TEMPERATURE: 97.1 F | HEART RATE: 80 BPM | HEIGHT: 66 IN | SYSTOLIC BLOOD PRESSURE: 136 MMHG | WEIGHT: 176 LBS | RESPIRATION RATE: 18 BRPM

## 2025-01-10 DIAGNOSIS — E53.8 B12 DEFICIENCY: Primary | ICD-10-CM

## 2025-01-10 DIAGNOSIS — E03.9 HYPOTHYROIDISM, UNSPECIFIED TYPE: ICD-10-CM

## 2025-01-10 DIAGNOSIS — I10 PRIMARY HYPERTENSION: ICD-10-CM

## 2025-01-10 DIAGNOSIS — G62.9 NEUROPATHY: ICD-10-CM

## 2025-01-10 DIAGNOSIS — Z13.0 SCREENING FOR ENDOCRINE, METABOLIC AND IMMUNITY DISORDER: ICD-10-CM

## 2025-01-10 DIAGNOSIS — R20.2 NUMBNESS AND TINGLING OF RIGHT ARM: ICD-10-CM

## 2025-01-10 DIAGNOSIS — Z13.29 SCREENING FOR ENDOCRINE, METABOLIC AND IMMUNITY DISORDER: ICD-10-CM

## 2025-01-10 DIAGNOSIS — E78.5 HYPERLIPIDEMIA, UNSPECIFIED HYPERLIPIDEMIA TYPE: ICD-10-CM

## 2025-01-10 DIAGNOSIS — R20.0 NUMBNESS AND TINGLING OF RIGHT ARM: ICD-10-CM

## 2025-01-10 DIAGNOSIS — K21.9 GASTROESOPHAGEAL REFLUX DISEASE, UNSPECIFIED WHETHER ESOPHAGITIS PRESENT: ICD-10-CM

## 2025-01-10 DIAGNOSIS — Z13.228 SCREENING FOR ENDOCRINE, METABOLIC AND IMMUNITY DISORDER: ICD-10-CM

## 2025-01-10 DIAGNOSIS — E11.65 TYPE 2 DIABETES MELLITUS WITH HYPERGLYCEMIA, UNSPECIFIED WHETHER LONG TERM INSULIN USE (HCC): ICD-10-CM

## 2025-01-10 DIAGNOSIS — E55.9 VITAMIN D DEFICIENCY: ICD-10-CM

## 2025-01-10 PROCEDURE — 3017F COLORECTAL CA SCREEN DOC REV: CPT | Performed by: INTERNAL MEDICINE

## 2025-01-10 PROCEDURE — 3079F DIAST BP 80-89 MM HG: CPT | Performed by: INTERNAL MEDICINE

## 2025-01-10 PROCEDURE — 3046F HEMOGLOBIN A1C LEVEL >9.0%: CPT | Performed by: INTERNAL MEDICINE

## 2025-01-10 PROCEDURE — 1036F TOBACCO NON-USER: CPT | Performed by: INTERNAL MEDICINE

## 2025-01-10 PROCEDURE — 2022F DILAT RTA XM EVC RTNOPTHY: CPT | Performed by: INTERNAL MEDICINE

## 2025-01-10 PROCEDURE — 3075F SYST BP GE 130 - 139MM HG: CPT | Performed by: INTERNAL MEDICINE

## 2025-01-10 PROCEDURE — G8419 CALC BMI OUT NRM PARAM NOF/U: HCPCS | Performed by: INTERNAL MEDICINE

## 2025-01-10 PROCEDURE — G8427 DOCREV CUR MEDS BY ELIG CLIN: HCPCS | Performed by: INTERNAL MEDICINE

## 2025-01-10 PROCEDURE — 99214 OFFICE O/P EST MOD 30 MIN: CPT | Performed by: INTERNAL MEDICINE

## 2025-01-10 SDOH — ECONOMIC STABILITY: FOOD INSECURITY: WITHIN THE PAST 12 MONTHS, THE FOOD YOU BOUGHT JUST DIDN'T LAST AND YOU DIDN'T HAVE MONEY TO GET MORE.: NEVER TRUE

## 2025-01-10 SDOH — ECONOMIC STABILITY: FOOD INSECURITY: WITHIN THE PAST 12 MONTHS, YOU WORRIED THAT YOUR FOOD WOULD RUN OUT BEFORE YOU GOT MONEY TO BUY MORE.: NEVER TRUE

## 2025-01-10 ASSESSMENT — PATIENT HEALTH QUESTIONNAIRE - PHQ9
SUM OF ALL RESPONSES TO PHQ QUESTIONS 1-9: 0
SUM OF ALL RESPONSES TO PHQ QUESTIONS 1-9: 0
2. FEELING DOWN, DEPRESSED OR HOPELESS: NOT AT ALL
1. LITTLE INTEREST OR PLEASURE IN DOING THINGS: NOT AT ALL
SUM OF ALL RESPONSES TO PHQ QUESTIONS 1-9: 0
SUM OF ALL RESPONSES TO PHQ QUESTIONS 1-9: 0
SUM OF ALL RESPONSES TO PHQ9 QUESTIONS 1 & 2: 0

## 2025-01-10 NOTE — PROGRESS NOTES
\"Have you been to the ER, urgent care clinic since your last visit?  Hospitalized since your last visit?\"    NO    “Have you seen or consulted any other health care providers outside of [facility] since your last visit?”    NO    “Have you had a colorectal cancer screening such as a colonoscopy/FIT/Cologuard?    NO    No colonoscopy on file  No cologuard on file  No FIT/FOBT on file   No flexible sigmoidoscopy on file        Have you had a mammogram?”   NO    Date of last Mammogram: 10/29/2015     C/o right should and arm painful and tingling worsening recently

## 2025-01-14 NOTE — ASSESSMENT & PLAN NOTE
Patient is noncompliant with her medications for various reasons.  Patient is encouraged to take medications regularly.

## 2025-01-14 NOTE — PROGRESS NOTES
Jerri Turner (: 1970) is a 54 y.o. female, here for evaluation of the following chief complaint(s):  Follow-up and Hypertension       ASSESSMENT/PLAN:  Below is the assessment and plan developed based on review of pertinent history, physical exam, labs, studies, and medications.    1. B12 deficiency  -     Vitamin B12; Future  2. Vitamin D deficiency  -     Vitamin D 25 Hydroxy; Future  3. Screening for endocrine, metabolic and immunity disorder  -     Lipid Panel; Future  -     Comprehensive Metabolic Panel; Future  -     CBC with Auto Differential; Future  -     Hemoglobin A1C; Future  4. Numbness and tingling of right arm  -     External Referral To Spine Surgery  5. Primary hypertension  Assessment & Plan:   Blood pressure is stable on current medications  6. Type 2 diabetes mellitus with hyperglycemia, unspecified whether long term insulin use (HCC)  Assessment & Plan:   Patient is noncompliant with her medications for various reasons.  Patient is encouraged to take medications regularly.  7. Gastroesophageal reflux disease, unspecified whether esophagitis present  Assessment & Plan:   Stable on Prilosec  8. Neuropathy  Assessment & Plan:   Stable on gabapentin, Cymbalta  9. Hyperlipidemia, unspecified hyperlipidemia type  Assessment & Plan:   Stable on Lipitor  10. Hypothyroidism, unspecified type  Assessment & Plan:   Stable on current doses of levothyroxine      Return in about 2 weeks (around 2025) for LABS TODAY, follow up on chronic conditions.     SUBJECTIVE/OBJECTIVE:  Hypertension      Patient is doing fine.  No chest pain or palpitations or shortness of breath.  No GI/ symptoms.    Patient complains of left arm pain radiating from back of neck, shoulder, arm.  Patient would like to follow-up with spine surgery.    Patient informed that she had been noncompliant with taking her medications because of improper insurance coverage of medications.    Patient is suggested to get all the

## 2025-01-24 ENCOUNTER — TELEPHONE (OUTPATIENT)
Facility: CLINIC | Age: 55
End: 2025-01-24

## 2025-01-24 DIAGNOSIS — G62.9 NEUROPATHY: ICD-10-CM

## 2025-01-24 RX ORDER — GABAPENTIN 300 MG/1
CAPSULE ORAL
Qty: 90 CAPSULE | Refills: 0 | Status: SHIPPED | OUTPATIENT
Start: 2025-01-24 | End: 2025-02-23

## 2025-01-24 NOTE — TELEPHONE ENCOUNTER
[facility]24/2025     The last fill date for Gabapentin 300 Mg Capsule was 12/2/2024 for a 30 d/s qty 90      Last UDS: not on file     CSA Last signed: not on file         PCP: Jayme Choudhury MD    Last appt:  1/10/2025   Future Appointments   Date Time Provider Department Center   1/30/2025 11:00 AM Jayme Choudhury MD [facility] DEP       Requested Prescriptions     Pending Prescriptions Disp Refills    gabapentin (NEURONTIN) 300 MG capsule [Pharmacy Med Name: GABAPENTIN 300MG CAPSULES] 90 capsule      Sig: TAKE 1 CAPSULE BY MOUTH THREE TIMES DAILY. MAX DAILY AMOUNT: 900 MG

## 2025-02-05 ENCOUNTER — TELEPHONE (OUTPATIENT)
Facility: CLINIC | Age: 55
End: 2025-02-05

## 2025-02-05 ENCOUNTER — HOSPITAL ENCOUNTER (OUTPATIENT)
Facility: HOSPITAL | Age: 55
Setting detail: SPECIMEN
Discharge: HOME | End: 2025-02-08
Payer: COMMERCIAL

## 2025-02-05 ENCOUNTER — OFFICE VISIT (OUTPATIENT)
Facility: CLINIC | Age: 55
End: 2025-02-05

## 2025-02-05 VITALS
BODY MASS INDEX: 28.93 KG/M2 | OXYGEN SATURATION: 98 % | HEIGHT: 66 IN | WEIGHT: 180 LBS | RESPIRATION RATE: 18 BRPM | HEART RATE: 78 BPM | TEMPERATURE: 97.2 F

## 2025-02-05 DIAGNOSIS — Z79.4 TYPE 2 DIABETES MELLITUS WITH OTHER SPECIFIED COMPLICATION, WITH LONG-TERM CURRENT USE OF INSULIN (HCC): ICD-10-CM

## 2025-02-05 DIAGNOSIS — Z13.0 SCREENING FOR ENDOCRINE, METABOLIC AND IMMUNITY DISORDER: ICD-10-CM

## 2025-02-05 DIAGNOSIS — E11.69 TYPE 2 DIABETES MELLITUS WITH OTHER SPECIFIED COMPLICATION, WITH LONG-TERM CURRENT USE OF INSULIN (HCC): ICD-10-CM

## 2025-02-05 DIAGNOSIS — Z13.228 SCREENING FOR ENDOCRINE, METABOLIC AND IMMUNITY DISORDER: ICD-10-CM

## 2025-02-05 DIAGNOSIS — M79.672 PAIN IN BOTH FEET: ICD-10-CM

## 2025-02-05 DIAGNOSIS — Z13.29 SCREENING FOR ENDOCRINE, METABOLIC AND IMMUNITY DISORDER: ICD-10-CM

## 2025-02-05 DIAGNOSIS — E55.9 VITAMIN D DEFICIENCY: ICD-10-CM

## 2025-02-05 DIAGNOSIS — E53.8 B12 DEFICIENCY: ICD-10-CM

## 2025-02-05 DIAGNOSIS — M79.671 PAIN IN BOTH FEET: ICD-10-CM

## 2025-02-05 DIAGNOSIS — Z23 NEED FOR TDAP VACCINATION: Primary | ICD-10-CM

## 2025-02-05 DIAGNOSIS — N28.9 IMPAIRED RENAL FUNCTION: ICD-10-CM

## 2025-02-05 DIAGNOSIS — I10 PRIMARY HYPERTENSION: ICD-10-CM

## 2025-02-05 DIAGNOSIS — E78.5 HYPERLIPIDEMIA, UNSPECIFIED HYPERLIPIDEMIA TYPE: ICD-10-CM

## 2025-02-05 LAB
25(OH)D3 SERPL-MCNC: 19.5 NG/ML (ref 30–100)
ALBUMIN SERPL-MCNC: 3.5 G/DL (ref 3.4–5)
ALBUMIN/GLOB SERPL: 0.9 (ref 0.8–1.7)
ALP SERPL-CCNC: 129 U/L (ref 45–117)
ALT SERPL-CCNC: 20 U/L (ref 13–56)
ANION GAP SERPL CALC-SCNC: 3 MMOL/L (ref 3–18)
AST SERPL-CCNC: 30 U/L (ref 10–38)
BASOPHILS # BLD: 0.03 K/UL (ref 0–0.1)
BASOPHILS NFR BLD: 0.5 % (ref 0–2)
BILIRUB SERPL-MCNC: 0.6 MG/DL (ref 0.2–1)
BUN SERPL-MCNC: 17 MG/DL (ref 7–18)
BUN/CREAT SERPL: 18 (ref 12–20)
CALCIUM SERPL-MCNC: 9.4 MG/DL (ref 8.5–10.1)
CHLORIDE SERPL-SCNC: 105 MMOL/L (ref 100–111)
CHOLEST SERPL-MCNC: 192 MG/DL
CO2 SERPL-SCNC: 31 MMOL/L (ref 21–32)
CREAT SERPL-MCNC: 0.92 MG/DL (ref 0.6–1.3)
DIFFERENTIAL METHOD BLD: ABNORMAL
EOSINOPHIL # BLD: 0.31 K/UL (ref 0–0.4)
EOSINOPHIL NFR BLD: 5.5 % (ref 0–5)
ERYTHROCYTE [DISTWIDTH] IN BLOOD BY AUTOMATED COUNT: 12.5 % (ref 11.6–14.5)
EST. AVERAGE GLUCOSE BLD GHB EST-MCNC: 292 MG/DL
GLOBULIN SER CALC-MCNC: 3.8 G/DL (ref 2–4)
GLUCOSE SERPL-MCNC: 206 MG/DL (ref 74–99)
HBA1C MFR BLD: 11.8 % (ref 4.2–5.6)
HCT VFR BLD AUTO: 42.6 % (ref 35–45)
HDLC SERPL-MCNC: 61 MG/DL (ref 40–60)
HDLC SERPL: 3.1 (ref 0–5)
HGB BLD-MCNC: 13 G/DL (ref 12–16)
IMM GRANULOCYTES # BLD AUTO: 0 K/UL (ref 0–0.04)
IMM GRANULOCYTES NFR BLD AUTO: 0 % (ref 0–0.5)
LDLC SERPL CALC-MCNC: 108.4 MG/DL (ref 0–100)
LIPID PANEL: ABNORMAL
LYMPHOCYTES # BLD: 2.23 K/UL (ref 0.9–3.6)
LYMPHOCYTES NFR BLD: 39.8 % (ref 21–52)
MCH RBC QN AUTO: 25.5 PG (ref 24–34)
MCHC RBC AUTO-ENTMCNC: 30.5 G/DL (ref 31–37)
MCV RBC AUTO: 83.5 FL (ref 78–100)
MONOCYTES # BLD: 0.47 K/UL (ref 0.05–1.2)
MONOCYTES NFR BLD: 8.4 % (ref 3–10)
NEUTS SEG # BLD: 2.56 K/UL (ref 1.8–8)
NEUTS SEG NFR BLD: 45.8 % (ref 40–73)
NRBC # BLD: 0 K/UL (ref 0–0.01)
NRBC BLD-RTO: 0 PER 100 WBC
PLATELET # BLD AUTO: 276 K/UL (ref 135–420)
PMV BLD AUTO: 11.8 FL (ref 9.2–11.8)
POTASSIUM SERPL-SCNC: 4.2 MMOL/L (ref 3.5–5.5)
PROT SERPL-MCNC: 7.3 G/DL (ref 6.4–8.2)
RBC # BLD AUTO: 5.1 M/UL (ref 4.2–5.3)
SODIUM SERPL-SCNC: 139 MMOL/L (ref 136–145)
TRIGL SERPL-MCNC: 113 MG/DL
VIT B12 SERPL-MCNC: 938 PG/ML (ref 211–911)
VLDLC SERPL CALC-MCNC: 22.6 MG/DL
WBC # BLD AUTO: 5.6 K/UL (ref 4.6–13.2)

## 2025-02-05 PROCEDURE — 80061 LIPID PANEL: CPT

## 2025-02-05 PROCEDURE — 83036 HEMOGLOBIN GLYCOSYLATED A1C: CPT

## 2025-02-05 PROCEDURE — 36415 COLL VENOUS BLD VENIPUNCTURE: CPT

## 2025-02-05 PROCEDURE — 85025 COMPLETE CBC W/AUTO DIFF WBC: CPT

## 2025-02-05 PROCEDURE — 82306 VITAMIN D 25 HYDROXY: CPT

## 2025-02-05 PROCEDURE — 82607 VITAMIN B-12: CPT

## 2025-02-05 PROCEDURE — 80053 COMPREHEN METABOLIC PANEL: CPT

## 2025-02-05 ASSESSMENT — PATIENT HEALTH QUESTIONNAIRE - PHQ9
SUM OF ALL RESPONSES TO PHQ9 QUESTIONS 1 & 2: 0
SUM OF ALL RESPONSES TO PHQ QUESTIONS 1-9: 0
SUM OF ALL RESPONSES TO PHQ QUESTIONS 1-9: 0
2. FEELING DOWN, DEPRESSED OR HOPELESS: NOT AT ALL
SUM OF ALL RESPONSES TO PHQ QUESTIONS 1-9: 0
1. LITTLE INTEREST OR PLEASURE IN DOING THINGS: NOT AT ALL
SUM OF ALL RESPONSES TO PHQ QUESTIONS 1-9: 0

## 2025-02-05 NOTE — TELEPHONE ENCOUNTER
Patient was requesting a DMV [place] application today to be completed by Dr. Choudhury. Patient states she has been receiving treatment from [facility].Her podiatrist has sent her to physical therapy to help with the pain in her feet. Patient has been advised to contact her podiatrist in order to have her application completed . Patient understands and is following up with Dr. Mcdonald's group.

## 2025-02-05 NOTE — PROGRESS NOTES
Jerri Turner presents today for   Chief Complaint   Patient presents with    Follow-up Chronic Condition           \"Have you been to the ER, urgent care clinic since your last visit?  Hospitalized since your last visit?\"    NO    “Have you seen or consulted any other health care providers outside of [facility] since your last visit?”    NO    “Have you had a colorectal cancer screening such as a colonoscopy/FIT/Cologuard?    NO    No colonoscopy on file  No cologuard on file  No FIT/FOBT on file   No flexible sigmoidoscopy on file        Have you had a mammogram?”   NO    Date of last Mammogram: 10/29/2015           Declined

## 2025-02-06 ENCOUNTER — TELEPHONE (OUTPATIENT)
Facility: CLINIC | Age: 55
End: 2025-02-06

## 2025-02-06 PROBLEM — N28.9 IMPAIRED RENAL FUNCTION: Status: ACTIVE | Noted: 2025-02-06

## 2025-02-06 PROBLEM — M79.671 PAIN IN BOTH FEET: Status: ACTIVE | Noted: 2025-02-06

## 2025-02-06 PROBLEM — M79.672 PAIN IN BOTH FEET: Status: ACTIVE | Noted: 2025-02-06

## 2025-02-06 RX ORDER — INSULIN GLARGINE 100 [IU]/ML
INJECTION, SOLUTION SUBCUTANEOUS
Qty: 5 ADJUSTABLE DOSE PRE-FILLED PEN SYRINGE | Refills: 3 | Status: SHIPPED | OUTPATIENT
Start: 2025-02-06

## 2025-02-06 RX ORDER — ERGOCALCIFEROL 1.25 MG/1
50000 CAPSULE, LIQUID FILLED ORAL WEEKLY
Qty: 12 CAPSULE | Refills: 1 | Status: SHIPPED | OUTPATIENT
Start: 2025-02-06

## 2025-02-06 NOTE — TELEPHONE ENCOUNTER
----- Message from Dr. Jayme Choudhury MD sent at 2/6/2025  5:18 AM EST -----  Please inform the patient that HbA1c is still elevated to 11.8.  To increase Lantus insulin to 50 units in the morning and 40 units in evening.  Also to increase Trulicity to 1.5 mg weekly.  New prescription sent  Vitamin D is low.  Prescription sent for supplement.    Patient to keep a log of blood sugars before breakfast and before dinner and make an appointment for virtual visit 4 weeks to discuss diabetes.    Thanks

## 2025-02-07 NOTE — PROGRESS NOTES
Jerri Turner (: 1970) is a 54 y.o. female, here for evaluation of the following chief complaint(s):  Follow-up Chronic Condition       ASSESSMENT/PLAN:  Below is the assessment and plan developed based on review of pertinent history, physical exam, labs, studies, and medications.    1. Need for Tdap vaccination  -     Tdap, BOOSTRIX, (age 10 yrs+), IM  2. Screening for endocrine, metabolic and immunity disorder  -     Lipid Panel; Future  -     Comprehensive Metabolic Panel; Future  -     CBC with Auto Differential; Future  -     Hemoglobin A1C; Future  3. Vitamin D deficiency  -     Vitamin D 25 Hydroxy; Future  -     vitamin D (ERGOCALCIFEROL) 1.25 MG (30536 UT) CAPS capsule; Take 1 capsule by mouth once a week, Disp-12 capsule, R-1Normal  -     Vitamin D 25 Hydroxy; Future  4. B12 deficiency  5. Type 2 diabetes mellitus with other specified complication, with long-term current use of insulin (HCC)  Assessment & Plan:   Patient has been noncompliant with her diet, exercise, getting labs done, medications because of various different reasons every time.    Patient is suggested to increase Trulicity to 1.5 mg weekly.  To continue current doses of insulin for now.  Patient to keep a log of blood sugar at home and bring it next office visit.  Orders:  -     insulin glargine (LANTUS SOLOSTAR) 100 UNIT/ML injection pen; Inject 50 units subcutaneously in the morning and 48 units at night, Disp-5 Adjustable Dose Pre-filled Pen Syringe, R-3Normal  -     dulaglutide (TRULICITY) 1.5 MG/0.5ML SC injection; Inject 0.5 mLs into the skin every 7 days, Disp-6 mL, R-1Normal  -     Hemoglobin A1C; Future  6. Impaired renal function  Assessment & Plan:   Patient is stable hydrated.  Avoid NSAIDs.  Awaiting repeat labs  7. Primary hypertension  Assessment & Plan:   Blood pressure is stable on current medications  8. Pain in both feet  Assessment & Plan:   Patient follows with podiatrist.  Recommended physical

## 2025-02-07 NOTE — ASSESSMENT & PLAN NOTE
Patient has been noncompliant with her diet, exercise, getting labs done, medications because of various different reasons every time.    Patient is suggested to increase Trulicity to 1.5 mg weekly.  To continue current doses of insulin for now.  Patient to keep a log of blood sugar at home and bring it next office visit.

## 2025-03-10 DIAGNOSIS — G62.9 NEUROPATHY: ICD-10-CM

## 2025-03-10 RX ORDER — GABAPENTIN 300 MG/1
CAPSULE ORAL
Qty: 90 CAPSULE | Refills: 0 | Status: SHIPPED | OUTPATIENT
Start: 2025-03-10 | End: 2025-04-09
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
## 2025-03-11 ENCOUNTER — OFFICE VISIT (OUTPATIENT)
Age: 55
End: 2025-03-11
Payer: COMMERCIAL

## 2025-03-11 VITALS — BODY MASS INDEX: 30.41 KG/M2 | WEIGHT: 188.4 LBS

## 2025-03-11 DIAGNOSIS — M75.101 TEAR OF RIGHT ROTATOR CUFF, UNSPECIFIED TEAR EXTENT, UNSPECIFIED WHETHER TRAUMATIC: ICD-10-CM

## 2025-03-11 DIAGNOSIS — M54.12 CERVICAL RADICULOPATHY: ICD-10-CM

## 2025-03-11 DIAGNOSIS — M25.511 RIGHT SHOULDER PAIN, UNSPECIFIED CHRONICITY: Primary | ICD-10-CM

## 2025-03-11 PROCEDURE — 3017F COLORECTAL CA SCREEN DOC REV: CPT | Performed by: ORTHOPAEDIC SURGERY

## 2025-03-11 PROCEDURE — 73030 X-RAY EXAM OF SHOULDER: CPT | Performed by: ORTHOPAEDIC SURGERY

## 2025-03-11 PROCEDURE — G8417 CALC BMI ABV UP PARAM F/U: HCPCS | Performed by: ORTHOPAEDIC SURGERY

## 2025-03-11 PROCEDURE — 99203 OFFICE O/P NEW LOW 30 MIN: CPT | Performed by: ORTHOPAEDIC SURGERY

## 2025-03-11 PROCEDURE — 1036F TOBACCO NON-USER: CPT | Performed by: ORTHOPAEDIC SURGERY

## 2025-03-11 PROCEDURE — G8427 DOCREV CUR MEDS BY ELIG CLIN: HCPCS | Performed by: ORTHOPAEDIC SURGERY

## 2025-03-11 PROCEDURE — 72040 X-RAY EXAM NECK SPINE 2-3 VW: CPT | Performed by: ORTHOPAEDIC SURGERY

## 2025-03-19 ENCOUNTER — HOSPITAL ENCOUNTER (OUTPATIENT)
Facility: HOSPITAL | Age: 55
Discharge: HOME | End: 2025-03-22
Attending: ORTHOPAEDIC SURGERY
Payer: COMMERCIAL

## 2025-03-19 DIAGNOSIS — M75.101 TEAR OF RIGHT ROTATOR CUFF, UNSPECIFIED TEAR EXTENT, UNSPECIFIED WHETHER TRAUMATIC: ICD-10-CM

## 2025-03-19 PROCEDURE — 73221 MRI JOINT UPR EXTREM W/O DYE: CPT

## 2025-04-09 ENCOUNTER — APPOINTMENT (OUTPATIENT)
Facility: HOSPITAL | Age: 55
End: 2025-04-09
Payer: COMMERCIAL

## 2025-04-09 ENCOUNTER — HOSPITAL ENCOUNTER (EMERGENCY)
Facility: HOSPITAL | Age: 55
Discharge: HOME | End: 2025-04-09
Attending: EMERGENCY MEDICINE
Payer: COMMERCIAL

## 2025-04-09 VITALS
HEART RATE: 78 BPM | HEIGHT: 66 IN | OXYGEN SATURATION: 98 % | TEMPERATURE: 98.7 F | BODY MASS INDEX: 30.37 KG/M2 | SYSTOLIC BLOOD PRESSURE: 161 MMHG | RESPIRATION RATE: 18 BRPM | DIASTOLIC BLOOD PRESSURE: 97 MMHG | WEIGHT: 189 LBS

## 2025-04-09 DIAGNOSIS — N10 ACUTE PYELONEPHRITIS: Primary | ICD-10-CM

## 2025-04-09 DIAGNOSIS — R10.9 FLANK PAIN: ICD-10-CM

## 2025-04-09 LAB
ALBUMIN SERPL-MCNC: 3.4 G/DL (ref 3.4–5)
ALBUMIN/GLOB SERPL: 0.7 (ref 0.8–1.7)
ALP SERPL-CCNC: 119 U/L (ref 45–117)
ALT SERPL-CCNC: 30 U/L (ref 13–56)
ANION GAP SERPL CALC-SCNC: 3 MMOL/L (ref 3–18)
APPEARANCE UR: ABNORMAL
AST SERPL-CCNC: 52 U/L (ref 10–38)
BACTERIA URNS QL MICRO: ABNORMAL /HPF
BASOPHILS # BLD: 0.04 K/UL (ref 0–0.1)
BASOPHILS NFR BLD: 0.4 % (ref 0–2)
BILIRUB SERPL-MCNC: 0.7 MG/DL (ref 0.2–1)
BILIRUB UR QL: NEGATIVE
BUN SERPL-MCNC: 15 MG/DL (ref 7–18)
BUN/CREAT SERPL: 16 (ref 12–20)
CALCIUM SERPL-MCNC: 9.6 MG/DL (ref 8.5–10.1)
CHLORIDE SERPL-SCNC: 106 MMOL/L (ref 100–111)
CO2 SERPL-SCNC: 31 MMOL/L (ref 21–32)
COLOR UR: ABNORMAL
CREAT SERPL-MCNC: 0.93 MG/DL (ref 0.6–1.3)
DIFFERENTIAL METHOD BLD: NORMAL
EOSINOPHIL # BLD: 0.34 K/UL (ref 0–0.4)
EOSINOPHIL NFR BLD: 3.4 % (ref 0–5)
EPITH CASTS URNS QL MICRO: ABNORMAL /LPF (ref 0–5)
ERYTHROCYTE [DISTWIDTH] IN BLOOD BY AUTOMATED COUNT: 13.1 % (ref 11.6–14.5)
GLOBULIN SER CALC-MCNC: 4.9 G/DL (ref 2–4)
GLUCOSE SERPL-MCNC: 68 MG/DL (ref 74–99)
GLUCOSE UR STRIP.AUTO-MCNC: NEGATIVE MG/DL
HCT VFR BLD AUTO: 41.7 % (ref 35–45)
HGB BLD-MCNC: 13.1 G/DL (ref 12–16)
HGB UR QL STRIP: ABNORMAL
IMM GRANULOCYTES # BLD AUTO: 0.02 K/UL (ref 0–0.04)
IMM GRANULOCYTES NFR BLD AUTO: 0.2 % (ref 0–0.5)
KETONES UR QL STRIP.AUTO: NEGATIVE MG/DL
LEUKOCYTE ESTERASE UR QL STRIP.AUTO: ABNORMAL
LIPASE SERPL-CCNC: 10 U/L (ref 13–75)
LYMPHOCYTES # BLD: 2.25 K/UL (ref 0.9–3.6)
LYMPHOCYTES NFR BLD: 22.7 % (ref 21–52)
MCH RBC QN AUTO: 25.6 PG (ref 24–34)
MCHC RBC AUTO-ENTMCNC: 31.4 G/DL (ref 31–37)
MCV RBC AUTO: 81.4 FL (ref 78–100)
MONOCYTES # BLD: 0.6 K/UL (ref 0.05–1.2)
MONOCYTES NFR BLD: 6.1 % (ref 3–10)
NEUTS SEG # BLD: 6.66 K/UL (ref 1.8–8)
NEUTS SEG NFR BLD: 67.2 % (ref 40–73)
NITRITE UR QL STRIP.AUTO: POSITIVE
NRBC # BLD: 0 K/UL (ref 0–0.01)
NRBC BLD-RTO: 0 PER 100 WBC
PH UR STRIP: 6 (ref 5–8)
PLATELET # BLD AUTO: 300 K/UL (ref 135–420)
PMV BLD AUTO: 10.4 FL (ref 9.2–11.8)
POTASSIUM SERPL-SCNC: 3.5 MMOL/L (ref 3.5–5.5)
PROT SERPL-MCNC: 8.3 G/DL (ref 6.4–8.2)
PROT UR STRIP-MCNC: 100 MG/DL
RBC # BLD AUTO: 5.12 M/UL (ref 4.2–5.3)
RBC #/AREA URNS HPF: ABNORMAL /HPF (ref 0–5)
SODIUM SERPL-SCNC: 140 MMOL/L (ref 136–145)
SP GR UR REFRACTOMETRY: 1.01 (ref 1–1.03)
UROBILINOGEN UR QL STRIP.AUTO: 1 EU/DL (ref 0.2–1)
WBC # BLD AUTO: 9.9 K/UL (ref 4.6–13.2)
WBC URNS QL MICRO: ABNORMAL /HPF (ref 0–4)

## 2025-04-09 PROCEDURE — 85025 COMPLETE CBC W/AUTO DIFF WBC: CPT

## 2025-04-09 PROCEDURE — 83690 ASSAY OF LIPASE: CPT

## 2025-04-09 PROCEDURE — 74176 CT ABD & PELVIS W/O CONTRAST: CPT

## 2025-04-09 PROCEDURE — 99284 EMERGENCY DEPT VISIT MOD MDM: CPT

## 2025-04-09 PROCEDURE — 96375 TX/PRO/DX INJ NEW DRUG ADDON: CPT

## 2025-04-09 PROCEDURE — 6360000002 HC RX W HCPCS: Performed by: EMERGENCY MEDICINE

## 2025-04-09 PROCEDURE — 96374 THER/PROPH/DIAG INJ IV PUSH: CPT

## 2025-04-09 PROCEDURE — 2580000003 HC RX 258: Performed by: EMERGENCY MEDICINE

## 2025-04-09 PROCEDURE — 80053 COMPREHEN METABOLIC PANEL: CPT

## 2025-04-09 PROCEDURE — 81001 URINALYSIS AUTO W/SCOPE: CPT

## 2025-04-09 PROCEDURE — 2500000003 HC RX 250 WO HCPCS: Performed by: EMERGENCY MEDICINE

## 2025-04-09 PROCEDURE — 87086 URINE CULTURE/COLONY COUNT: CPT

## 2025-04-09 RX ORDER — 0.9 % SODIUM CHLORIDE 0.9 %
1000 INTRAVENOUS SOLUTION INTRAVENOUS ONCE
Status: COMPLETED | OUTPATIENT
Start: 2025-04-09 | End: 2025-04-09

## 2025-04-09 RX ORDER — IBUPROFEN 400 MG/1
400 TABLET, FILM COATED ORAL EVERY 6 HOURS PRN
Qty: 10 TABLET | Refills: 0 | Status: SHIPPED | OUTPATIENT
Start: 2025-04-09

## 2025-04-09 RX ORDER — CEFDINIR 300 MG/1
300 CAPSULE ORAL 2 TIMES DAILY
Qty: 14 CAPSULE | Refills: 0 | Status: SHIPPED | OUTPATIENT
Start: 2025-04-09 | End: 2025-04-16

## 2025-04-09 RX ORDER — KETOROLAC TROMETHAMINE 15 MG/ML
15 INJECTION, SOLUTION INTRAMUSCULAR; INTRAVENOUS
Status: COMPLETED | OUTPATIENT
Start: 2025-04-09 | End: 2025-04-09

## 2025-04-09 RX ORDER — ACETAMINOPHEN 500 MG
500 TABLET ORAL EVERY 6 HOURS PRN
Qty: 30 TABLET | Refills: 1 | Status: SHIPPED | OUTPATIENT
Start: 2025-04-09

## 2025-04-09 RX ORDER — MORPHINE SULFATE 2 MG/ML
2 INJECTION, SOLUTION INTRAMUSCULAR; INTRAVENOUS
Refills: 0 | Status: COMPLETED | OUTPATIENT
Start: 2025-04-09 | End: 2025-04-09

## 2025-04-09 RX ADMIN — KETOROLAC TROMETHAMINE 15 MG: 15 INJECTION, SOLUTION INTRAMUSCULAR; INTRAVENOUS at 12:30

## 2025-04-09 RX ADMIN — SODIUM CHLORIDE 1000 ML: 0.9 INJECTION, SOLUTION INTRAVENOUS at 11:05

## 2025-04-09 RX ADMIN — CEFTRIAXONE 1000 MG: 1 INJECTION, POWDER, FOR SOLUTION INTRAMUSCULAR; INTRAVENOUS at 11:06

## 2025-04-09 RX ADMIN — MORPHINE SULFATE 2 MG: 2 INJECTION, SOLUTION INTRAMUSCULAR; INTRAVENOUS at 11:06

## 2025-04-09 ASSESSMENT — PAIN DESCRIPTION - ORIENTATION: ORIENTATION: LEFT

## 2025-04-09 ASSESSMENT — PAIN DESCRIPTION - PAIN TYPE: TYPE: ACUTE PAIN

## 2025-04-09 ASSESSMENT — PAIN SCALES - GENERAL: PAINLEVEL_OUTOF10: 10

## 2025-04-09 ASSESSMENT — PAIN DESCRIPTION - LOCATION: LOCATION: BACK

## 2025-04-09 ASSESSMENT — ENCOUNTER SYMPTOMS
ABDOMINAL PAIN: 0
EYES NEGATIVE: 1
CHEST TIGHTNESS: 0

## 2025-04-09 ASSESSMENT — PAIN DESCRIPTION - ONSET: ONSET: AWAKENED FROM SLEEP

## 2025-04-09 ASSESSMENT — PAIN - FUNCTIONAL ASSESSMENT
PAIN_FUNCTIONAL_ASSESSMENT: 0-10
PAIN_FUNCTIONAL_ASSESSMENT: PREVENTS OR INTERFERES WITH ALL ACTIVE AND SOME PASSIVE ACTIVITIES

## 2025-04-09 ASSESSMENT — PAIN DESCRIPTION - FREQUENCY: FREQUENCY: CONTINUOUS

## 2025-04-09 ASSESSMENT — PAIN DESCRIPTION - DESCRIPTORS: DESCRIPTORS: ACHING;SHARP;PRESSURE

## 2025-04-09 NOTE — ED TRIAGE NOTES
Patient arrived to ER with complaints of left side back pain that started last night and getting worse. Patient states she is diabetic and thinks she has a bladder infection. Denies any fevers, urinary pressure and frequency for last 2 days.

## 2025-04-09 NOTE — DISCHARGE INSTRUCTIONS
It appears you have a kidney infection and you will need to be on strong antibiotics for the next week.  Have given you a strong dose of antibiotics in the emergency department so your next dose is due tomorrow.  If you have any worsening or any concern please return immediately for reevaluation.  There is no evidence of kidney stone on your workup.

## 2025-04-09 NOTE — ED PROVIDER NOTES
medications:  Medications   sodium chloride 0.9 % bolus 1,000 mL (has no administration in time range)   cefTRIAXone (ROCEPHIN) 1,000 mg in sterile water 10 mL IV syringe (has no administration in time range)   morphine (PF) injection 2 mg (has no administration in time range)       CONSULTS: (Who and What was discussed)  None    Chronic Conditions: HTN, DM    Social Determinants affecting Dx or Tx: None    Records Reviewed (source and summary of external notes): Nursing Notes, Old Medical Records, Previous Radiology Studies, and Previous Laboratory Studies    Procedures          Diagnosis     Clinical Impression:   1. Acute pyelonephritis    2. Flank pain        Disposition: Jayme Gregg MD  [address]  [address]  [facility] [address]  [phone]    In 1 week      [facility] Emergency Department  [address]  [place] [address] [phone]    As needed, If symptoms worsen       Disclaimer: Sections of this note are dictated using utilizing voice recognition software.  Minor typographical errors may be present. If questions arise, please do not hesitate to contact me or call our department.          Niranjan Cadet MD  04/11/25 3507

## 2025-04-11 LAB
BACTERIA SPEC CULT: ABNORMAL
CC UR VC: ABNORMAL
SERVICE CMNT-IMP: ABNORMAL

## 2025-04-14 ENCOUNTER — HOSPITAL ENCOUNTER (OUTPATIENT)
Facility: HOSPITAL | Age: 55
Discharge: HOME | End: 2025-04-17
Attending: INTERNAL MEDICINE
Payer: COMMERCIAL

## 2025-04-14 VITALS — HEIGHT: 66 IN | BODY MASS INDEX: 30.37 KG/M2 | WEIGHT: 189 LBS

## 2025-04-14 DIAGNOSIS — N64.4 BREAST PAIN: ICD-10-CM

## 2025-04-14 PROCEDURE — G0279 TOMOSYNTHESIS, MAMMO: HCPCS

## 2025-04-16 ENCOUNTER — RESULTS FOLLOW-UP (OUTPATIENT)
Facility: HOSPITAL | Age: 55
End: 2025-04-16

## 2025-04-21 ENCOUNTER — TELEPHONE (OUTPATIENT)
Facility: CLINIC | Age: 55
End: 2025-04-21

## 2025-04-30 ENCOUNTER — HOSPITAL ENCOUNTER (OUTPATIENT)
Facility: HOSPITAL | Age: 55
Setting detail: SPECIMEN
Discharge: HOME | End: 2025-05-03
Payer: COMMERCIAL

## 2025-04-30 DIAGNOSIS — Z79.4 TYPE 2 DIABETES MELLITUS WITH OTHER SPECIFIED COMPLICATION, WITH LONG-TERM CURRENT USE OF INSULIN (HCC): ICD-10-CM

## 2025-04-30 DIAGNOSIS — E11.69 TYPE 2 DIABETES MELLITUS WITH OTHER SPECIFIED COMPLICATION, WITH LONG-TERM CURRENT USE OF INSULIN (HCC): ICD-10-CM

## 2025-04-30 DIAGNOSIS — E55.9 VITAMIN D DEFICIENCY: ICD-10-CM

## 2025-04-30 LAB
25(OH)D3 SERPL-MCNC: 15.6 NG/ML (ref 30–100)
EST. AVERAGE GLUCOSE BLD GHB EST-MCNC: 246 MG/DL
HBA1C MFR BLD: 10.2 % (ref 4.2–5.6)

## 2025-04-30 PROCEDURE — 82306 VITAMIN D 25 HYDROXY: CPT

## 2025-04-30 PROCEDURE — 83036 HEMOGLOBIN GLYCOSYLATED A1C: CPT

## 2025-04-30 PROCEDURE — 36415 COLL VENOUS BLD VENIPUNCTURE: CPT

## 2025-05-07 ENCOUNTER — HOSPITAL ENCOUNTER (OUTPATIENT)
Age: 55
Discharge: HOME | End: 2025-05-07
Payer: COMMERCIAL

## 2025-05-07 ENCOUNTER — OFFICE VISIT (OUTPATIENT)
Facility: CLINIC | Age: 55
End: 2025-05-07
Payer: COMMERCIAL

## 2025-05-07 VITALS
BODY MASS INDEX: 29.89 KG/M2 | TEMPERATURE: 98 F | HEART RATE: 89 BPM | DIASTOLIC BLOOD PRESSURE: 87 MMHG | HEIGHT: 66 IN | RESPIRATION RATE: 18 BRPM | WEIGHT: 186 LBS | SYSTOLIC BLOOD PRESSURE: 134 MMHG | OXYGEN SATURATION: 98 %

## 2025-05-07 DIAGNOSIS — I10 PRIMARY HYPERTENSION: ICD-10-CM

## 2025-05-07 DIAGNOSIS — R11.2 NAUSEA AND VOMITING, UNSPECIFIED VOMITING TYPE: ICD-10-CM

## 2025-05-07 DIAGNOSIS — E78.5 HYPERLIPIDEMIA, UNSPECIFIED HYPERLIPIDEMIA TYPE: ICD-10-CM

## 2025-05-07 DIAGNOSIS — E03.9 HYPOTHYROIDISM, UNSPECIFIED TYPE: ICD-10-CM

## 2025-05-07 DIAGNOSIS — R10.32 LEFT LOWER QUADRANT ABDOMINAL PAIN: ICD-10-CM

## 2025-05-07 DIAGNOSIS — M79.672 PAIN IN BOTH FEET: ICD-10-CM

## 2025-05-07 DIAGNOSIS — M79.671 PAIN IN BOTH FEET: ICD-10-CM

## 2025-05-07 DIAGNOSIS — R10.32 LEFT LOWER QUADRANT ABDOMINAL PAIN: Primary | ICD-10-CM

## 2025-05-07 DIAGNOSIS — G62.9 NEUROPATHY: ICD-10-CM

## 2025-05-07 DIAGNOSIS — J45.909 UNCOMPLICATED ASTHMA, UNSPECIFIED ASTHMA SEVERITY, UNSPECIFIED WHETHER PERSISTENT: ICD-10-CM

## 2025-05-07 DIAGNOSIS — E11.65 TYPE 2 DIABETES MELLITUS WITH HYPERGLYCEMIA, UNSPECIFIED WHETHER LONG TERM INSULIN USE (HCC): ICD-10-CM

## 2025-05-07 LAB
APPEARANCE UR: NORMAL
BACTERIA URNS QL MICRO: ABNORMAL /HPF
BILIRUB UR QL: NEGATIVE
COLOR UR: YELLOW
EPITH CASTS URNS QL MICRO: ABNORMAL /LPF (ref 0–5)
GLUCOSE UR STRIP.AUTO-MCNC: 500 MG/DL
HGB UR QL STRIP: NEGATIVE
KETONES UR QL STRIP.AUTO: NORMAL MG/DL
LEUKOCYTE ESTERASE UR QL STRIP.AUTO: NEGATIVE
NITRITE UR QL STRIP.AUTO: NEGATIVE
PH UR STRIP: 5.5
PROT UR STRIP-MCNC: 30 MG/DL
RBC #/AREA URNS HPF: ABNORMAL /HPF (ref 0–5)
SP GR UR REFRACTOMETRY: 1.02 (ref 1–1.04)
UROBILINOGEN UR QL STRIP.AUTO: 0.2 EU/DL
WBC URNS QL MICRO: ABNORMAL /HPF (ref 0–5)
YEAST URNS QL MICRO: ABNORMAL

## 2025-05-07 PROCEDURE — 3075F SYST BP GE 130 - 139MM HG: CPT | Performed by: INTERNAL MEDICINE

## 2025-05-07 PROCEDURE — 85025 COMPLETE CBC W/AUTO DIFF WBC: CPT

## 2025-05-07 PROCEDURE — 1036F TOBACCO NON-USER: CPT | Performed by: INTERNAL MEDICINE

## 2025-05-07 PROCEDURE — 2022F DILAT RTA XM EVC RTNOPTHY: CPT | Performed by: INTERNAL MEDICINE

## 2025-05-07 PROCEDURE — 80053 COMPREHEN METABOLIC PANEL: CPT

## 2025-05-07 PROCEDURE — 3079F DIAST BP 80-89 MM HG: CPT | Performed by: INTERNAL MEDICINE

## 2025-05-07 PROCEDURE — 81003 URINALYSIS AUTO W/O SCOPE: CPT

## 2025-05-07 PROCEDURE — 87086 URINE CULTURE/COLONY COUNT: CPT

## 2025-05-07 PROCEDURE — 84443 ASSAY THYROID STIM HORMONE: CPT

## 2025-05-07 PROCEDURE — 84439 ASSAY OF FREE THYROXINE: CPT

## 2025-05-07 PROCEDURE — 99215 OFFICE O/P EST HI 40 MIN: CPT | Performed by: INTERNAL MEDICINE

## 2025-05-07 PROCEDURE — 81001 URINALYSIS AUTO W/SCOPE: CPT

## 2025-05-07 PROCEDURE — G8417 CALC BMI ABV UP PARAM F/U: HCPCS | Performed by: INTERNAL MEDICINE

## 2025-05-07 PROCEDURE — 83690 ASSAY OF LIPASE: CPT

## 2025-05-07 PROCEDURE — 36415 COLL VENOUS BLD VENIPUNCTURE: CPT

## 2025-05-07 PROCEDURE — 3017F COLORECTAL CA SCREEN DOC REV: CPT | Performed by: INTERNAL MEDICINE

## 2025-05-07 PROCEDURE — 3046F HEMOGLOBIN A1C LEVEL >9.0%: CPT | Performed by: INTERNAL MEDICINE

## 2025-05-07 PROCEDURE — G8427 DOCREV CUR MEDS BY ELIG CLIN: HCPCS | Performed by: INTERNAL MEDICINE

## 2025-05-07 RX ORDER — ONDANSETRON 4 MG/1
4 TABLET, FILM COATED ORAL 3 TIMES DAILY PRN
Qty: 30 TABLET | Refills: 0 | Status: SHIPPED | OUTPATIENT
Start: 2025-05-07

## 2025-05-07 NOTE — PROGRESS NOTES
Jerri Turner presents today for   Chief Complaint   Patient presents with    Follow-up           \"Have you been to the ER, urgent care clinic since your last visit?  Hospitalized since your last visit?\"    NO    “Have you seen or consulted any other health care providers outside of [facility] since your last visit?”    NO    “Have you had a colorectal cancer screening such as a colonoscopy/FIT/Cologuard?    NO    No colonoscopy on file  No cologuard on file  No FIT/FOBT on file   No flexible sigmoidoscopy on file

## 2025-05-08 LAB
ALBUMIN SERPL-MCNC: 3.4 G/DL (ref 3.4–5)
ALBUMIN/GLOB SERPL: ABNORMAL
ALP SERPL-CCNC: 106 U/L (ref 45–117)
ALT SERPL-CCNC: 21 U/L (ref 10–35)
ANION GAP SERPL CALC-SCNC: 12 MMOL/L (ref 3–18)
AST SERPL-CCNC: 44 U/L (ref 10–38)
BILIRUB SERPL-MCNC: 0.6 MG/DL (ref 0.2–1)
BUN SERPL-MCNC: 14 MG/DL (ref 6–23)
BUN/CREAT SERPL: 14 (ref 12–20)
CALCIUM SERPL-MCNC: 9.7 MG/DL (ref 8.5–10.1)
CHLORIDE SERPL-SCNC: 102 MMOL/L (ref 98–107)
CO2 SERPL-SCNC: 27 MMOL/L (ref 21–32)
CREAT SERPL-MCNC: 1.01 MG/DL (ref 0.6–1.3)
GLOBULIN SER CALC-MCNC: ABNORMAL G/DL
GLUCOSE SERPL-MCNC: 249 MG/DL (ref 74–108)
LIPASE SERPL-CCNC: 9 U/L (ref 13–75)
POTASSIUM SERPL-SCNC: 4.2 MMOL/L (ref 3.5–5.5)
PROT SERPL-MCNC: 7 G/DL (ref 6.4–8.2)
SODIUM SERPL-SCNC: 141 MMOL/L (ref 136–145)
T4 FREE SERPL-MCNC: 1.1 NG/DL (ref 0.9–1.7)
TSH, 3RD GENERATION: NORMAL UIU/ML

## 2025-05-08 NOTE — PROGRESS NOTES
Jerri Turner (: 1970) is a 55 y.o. female, here for evaluation of the following chief complaint(s):  Follow-up       ASSESSMENT/PLAN:  Below is the assessment and plan developed based on review of pertinent history, physical exam, labs, studies, and medications.    1. Left lower quadrant abdominal pain  Assessment & Plan:   Patient is suggested to go to the ER for further evaluation and management.  Plan to get CT abdomen as soon as possible.   Orders:  -     Urinalysis; Future  -     Culture, Urine; Future  -     CT ABDOMEN PELVIS WO CONTRAST Additional Contrast? Radiologist Recommendation; Future  2. Nausea and vomiting, unspecified vomiting type  Assessment & Plan:   Patient is suggested to go to the ER for further evaluation and management.  Also sending prescription for Zofran.  Orders:  -     Lipase; Future  -     CBC with Auto Differential; Future  -     Comprehensive Metabolic Panel; Future  -     ondansetron (ZOFRAN) 4 MG tablet; Take 1 tablet by mouth 3 times daily as needed for Nausea or Vomiting, Disp-30 tablet, R-0Normal  -     CT ABDOMEN PELVIS WO CONTRAST Additional Contrast? Radiologist Recommendation; Future  3. Hypothyroidism, unspecified type  Assessment & Plan:   Stable on current doses of levothyroxine.  Orders:  -     TSH + Free T4 Panel; Future  4. Primary hypertension  Assessment & Plan:   Blood pressure stable on current medications.  5. Uncomplicated asthma, unspecified asthma severity, unspecified whether persistent  Assessment & Plan:   Stable on albuterol inhaler as needed  6. Hyperlipidemia, unspecified hyperlipidemia type  Assessment & Plan:   Stable on Lipitor.  7. Type 2 diabetes mellitus with hyperglycemia, unspecified whether long term insulin use (HCC)  Assessment & Plan:   To continue on current dose of insulin.  Patient to keep a log of blood sugars at home, before meals and bring it next office visit.  To stop Trulicity for now because of patient complaining of

## 2025-05-08 NOTE — ASSESSMENT & PLAN NOTE
To continue on current dose of insulin.  Patient to keep a log of blood sugars at home, before meals and bring it next office visit.  To stop Trulicity for now because of patient complaining of nausea/vomiting/abdominal pain

## 2025-05-08 NOTE — ASSESSMENT & PLAN NOTE
Patient is suggested to go to the ER for further evaluation and management.  Also sending prescription for Zofran.   .

## 2025-05-08 NOTE — ASSESSMENT & PLAN NOTE
Patient is suggested to go to the ER for further evaluation and management.  Plan to get CT abdomen as soon as possible.

## 2025-05-09 LAB
BACTERIA SPEC CULT: NORMAL
CC UR VC: NORMAL
SERVICE CMNT-IMP: NORMAL

## 2025-05-14 ENCOUNTER — TELEPHONE (OUTPATIENT)
Facility: CLINIC | Age: 55
End: 2025-05-14

## 2025-05-14 NOTE — TELEPHONE ENCOUNTER
Please call patient for follow up appointment.      Return in about 1 week  (around 5/14/2025) for LABS TODAY, follow up on chronic conditions.

## 2025-05-16 DIAGNOSIS — K21.9 GASTROESOPHAGEAL REFLUX DISEASE, UNSPECIFIED WHETHER ESOPHAGITIS PRESENT: ICD-10-CM

## 2025-05-16 DIAGNOSIS — I10 PRIMARY HYPERTENSION: ICD-10-CM

## 2025-05-16 DIAGNOSIS — G62.9 NEUROPATHY: ICD-10-CM

## 2025-05-16 RX ORDER — DULOXETIN HYDROCHLORIDE 20 MG/1
20 CAPSULE, DELAYED RELEASE ORAL DAILY
Qty: 30 CAPSULE | Refills: 3 | Status: SHIPPED | OUTPATIENT
Start: 2025-05-16

## 2025-05-16 RX ORDER — GABAPENTIN 300 MG/1
CAPSULE ORAL
Qty: 90 CAPSULE | Refills: 0 | Status: SHIPPED | OUTPATIENT
Start: 2025-05-16 | End: 2025-06-15

## 2025-05-16 RX ORDER — LOSARTAN POTASSIUM AND HYDROCHLOROTHIAZIDE 12.5; 1 MG/1; MG/1
1 TABLET ORAL DAILY
Qty: 90 TABLET | Refills: 1 | Status: SHIPPED | OUTPATIENT
Start: 2025-05-16

## 2025-05-16 RX ORDER — OMEPRAZOLE 20 MG/1
20 CAPSULE, DELAYED RELEASE ORAL
Qty: 30 CAPSULE | Refills: 0 | Status: SHIPPED | OUTPATIENT
Start: 2025-05-16

## 2025-05-16 NOTE — TELEPHONE ENCOUNTER
Last OV: 5/7/2025  Next OV: 5/21/2025  Last refill: 2/6/2025     reviewed 5/16/2025       Gabapentin 300 Mg Capsule last filled 3/21/2025 for a 30 d/s qty 30

## 2025-05-18 LAB
ALBUMIN SERPL-MCNC: 3.4 G/DL (ref 3.4–5)
ALBUMIN/GLOB SERPL: 0.9 (ref 1.1–2.2)
ALP SERPL-CCNC: 106 U/L (ref 45–117)
ALT SERPL-CCNC: 21 U/L (ref 10–35)
ANION GAP SERPL CALC-SCNC: 12 MMOL/L (ref 3–18)
AST SERPL-CCNC: 44 U/L (ref 10–38)
BILIRUB SERPL-MCNC: 0.6 MG/DL (ref 0.2–1)
BUN SERPL-MCNC: 14 MG/DL (ref 6–23)
BUN/CREAT SERPL: 14 (ref 12–20)
CALCIUM SERPL-MCNC: 9.7 MG/DL (ref 8.5–10.1)
CHLORIDE SERPL-SCNC: 102 MMOL/L (ref 98–107)
CO2 SERPL-SCNC: 27 MMOL/L (ref 21–32)
CREAT SERPL-MCNC: 1.01 MG/DL (ref 0.6–1.3)
GLOBULIN SER CALC-MCNC: 3.6 G/DL (ref 2–4)
GLUCOSE SERPL-MCNC: 249 MG/DL (ref 74–108)
POTASSIUM SERPL-SCNC: 4.2 MMOL/L (ref 3.5–5.5)
PROT SERPL-MCNC: 7 G/DL (ref 6.4–8.2)
SODIUM SERPL-SCNC: 141 MMOL/L (ref 136–145)

## 2025-05-19 ENCOUNTER — RESULTS FOLLOW-UP (OUTPATIENT)
Facility: CLINIC | Age: 55
End: 2025-05-19

## 2025-05-19 DIAGNOSIS — E11.65 TYPE 2 DIABETES MELLITUS WITH HYPERGLYCEMIA, UNSPECIFIED WHETHER LONG TERM INSULIN USE (HCC): Primary | ICD-10-CM

## 2025-05-21 ENCOUNTER — HOSPITAL ENCOUNTER (OUTPATIENT)
Age: 55
Discharge: HOME | End: 2025-05-24
Payer: COMMERCIAL

## 2025-05-21 DIAGNOSIS — R11.2 NAUSEA AND VOMITING, UNSPECIFIED VOMITING TYPE: ICD-10-CM

## 2025-05-21 DIAGNOSIS — R10.32 LEFT LOWER QUADRANT ABDOMINAL PAIN: ICD-10-CM

## 2025-05-21 PROCEDURE — 74176 CT ABD & PELVIS W/O CONTRAST: CPT

## 2025-06-15 DIAGNOSIS — K21.9 GASTROESOPHAGEAL REFLUX DISEASE, UNSPECIFIED WHETHER ESOPHAGITIS PRESENT: ICD-10-CM

## 2025-06-16 RX ORDER — OMEPRAZOLE 20 MG/1
20 CAPSULE, DELAYED RELEASE ORAL
Qty: 30 CAPSULE | Refills: 0 | Status: SHIPPED | OUTPATIENT
Start: 2025-06-16

## 2025-06-16 NOTE — TELEPHONE ENCOUNTER
PCP: Jayme Choudhury MD    LAST OFFICE VISIT: 05/07/2025    LAST REFILL PER CHART:  Medication:omeprazole (PRILOSEC) 20 MG delayed release capsule   Ordered On:05/16/2025  Instructions: TAKE 1 CAPSULE BY MOUTH EVERY MORNING BEFORE BREAKFAST   Dispense:30 capsules  Refills:0    Future Appointments   Date Time Provider Department Center   6/18/2025  2:40 PM Jayme Choudhury MD [facility] DEP

## 2025-06-22 NOTE — TELEPHONE ENCOUNTER
Last OV: 03/13/24  Next OV: 04/24/24  Last RX: 11/20/23- aspirin                  03/13/24- lisinopril        03/13/24- atorvastatin    Please refuse lisinopril and atorvastatin. Recently refilled w/ x1 refill remaining       36.7

## 2025-07-21 DIAGNOSIS — G62.9 NEUROPATHY: ICD-10-CM

## 2025-07-21 RX ORDER — GABAPENTIN 300 MG/1
CAPSULE ORAL
Qty: 90 CAPSULE | Refills: 0 | Status: SHIPPED | OUTPATIENT
Start: 2025-07-21 | End: 2025-08-20

## 2025-08-10 ENCOUNTER — HOSPITAL ENCOUNTER (EMERGENCY)
Age: 55
Discharge: HOME | End: 2025-08-10
Attending: EMERGENCY MEDICINE
Payer: COMMERCIAL

## 2025-08-10 VITALS
OXYGEN SATURATION: 100 % | TEMPERATURE: 98.5 F | RESPIRATION RATE: 18 BRPM | DIASTOLIC BLOOD PRESSURE: 89 MMHG | HEART RATE: 76 BPM | WEIGHT: 193 LBS | SYSTOLIC BLOOD PRESSURE: 214 MMHG | HEIGHT: 66 IN | BODY MASS INDEX: 31.02 KG/M2

## 2025-08-10 DIAGNOSIS — Z79.4 DIABETES MELLITUS DUE TO UNDERLYING CONDITION WITH HYPEROSMOLARITY WITHOUT COMA, WITH LONG-TERM CURRENT USE OF INSULIN (HCC): Primary | ICD-10-CM

## 2025-08-10 DIAGNOSIS — Z79.4 TYPE 2 DIABETES MELLITUS WITH OTHER SPECIFIED COMPLICATION, WITH LONG-TERM CURRENT USE OF INSULIN (HCC): ICD-10-CM

## 2025-08-10 DIAGNOSIS — E08.00 DIABETES MELLITUS DUE TO UNDERLYING CONDITION WITH HYPEROSMOLARITY WITHOUT COMA, WITH LONG-TERM CURRENT USE OF INSULIN (HCC): Primary | ICD-10-CM

## 2025-08-10 DIAGNOSIS — E11.69 TYPE 2 DIABETES MELLITUS WITH OTHER SPECIFIED COMPLICATION, WITH LONG-TERM CURRENT USE OF INSULIN (HCC): ICD-10-CM

## 2025-08-10 LAB
ALBUMIN SERPL-MCNC: 3.4 G/DL (ref 3.4–5)
ALBUMIN/GLOB SERPL: 0.9 (ref 1–1.9)
ALP SERPL-CCNC: 119 U/L (ref 45–117)
ALT SERPL-CCNC: 34 U/L (ref 10–35)
ANION GAP SERPL CALC-SCNC: 10 MMOL/L (ref 7–16)
APPEARANCE UR: ABNORMAL
AST SERPL-CCNC: 63 U/L (ref 10–38)
BACTERIA URNS QL MICRO: ABNORMAL /HPF
BASOPHILS # BLD: 0.04 K/UL (ref 0–0.1)
BASOPHILS NFR BLD: 0.5 % (ref 0–2)
BILIRUB SERPL-MCNC: 0.4 MG/DL (ref 0.2–1)
BILIRUB UR QL: NEGATIVE
BUN SERPL-MCNC: 21 MG/DL (ref 6–23)
BUN/CREAT SERPL: 21
CALCIUM SERPL-MCNC: 9.2 MG/DL (ref 8.5–10.1)
CHLORIDE SERPL-SCNC: 103 MMOL/L (ref 98–107)
CO2 SERPL-SCNC: 28 MMOL/L (ref 21–32)
COLOR UR: YELLOW
CREAT SERPL-MCNC: 1.02 MG/DL (ref 0.6–1.3)
DIFFERENTIAL METHOD BLD: NORMAL
EOSINOPHIL # BLD: 0.24 K/UL (ref 0–0.4)
EOSINOPHIL NFR BLD: 3.1 % (ref 0–5)
EPITH CASTS URNS QL MICRO: NEGATIVE /LPF (ref 0–5)
ERYTHROCYTE [DISTWIDTH] IN BLOOD BY AUTOMATED COUNT: 13.1 % (ref 11.6–14.5)
GLOBULIN SER CALC-MCNC: 3.6 G/DL (ref 2.3–3.5)
GLUCOSE BLD STRIP.AUTO-MCNC: 175 MG/DL (ref 70–110)
GLUCOSE BLD STRIP.AUTO-MCNC: 309 MG/DL (ref 70–110)
GLUCOSE SERPL-MCNC: 299 MG/DL (ref 74–108)
GLUCOSE UR STRIP.AUTO-MCNC: >1000 MG/DL
HCT VFR BLD AUTO: 38.9 % (ref 35–45)
HGB BLD-MCNC: 12.3 G/DL (ref 12–16)
HGB UR QL STRIP: ABNORMAL
IMM GRANULOCYTES # BLD AUTO: 0.02 K/UL (ref 0–0.04)
IMM GRANULOCYTES NFR BLD AUTO: 0.3 % (ref 0–0.5)
KETONES UR QL STRIP.AUTO: NEGATIVE MG/DL
LEUKOCYTE ESTERASE UR QL STRIP.AUTO: ABNORMAL
LYMPHOCYTES # BLD: 2.27 K/UL (ref 0.9–3.6)
LYMPHOCYTES NFR BLD: 29.7 % (ref 21–52)
MCH RBC QN AUTO: 25.6 PG (ref 24–34)
MCHC RBC AUTO-ENTMCNC: 31.6 G/DL (ref 31–37)
MCV RBC AUTO: 81 FL (ref 78–100)
MONOCYTES # BLD: 0.62 K/UL (ref 0.05–1.2)
MONOCYTES NFR BLD: 8.1 % (ref 3–10)
NEUTS SEG # BLD: 4.45 K/UL (ref 1.8–8)
NEUTS SEG NFR BLD: 58.3 % (ref 40–73)
NITRITE UR QL STRIP.AUTO: NEGATIVE
NRBC # BLD: 0 K/UL (ref 0–0.01)
NRBC BLD-RTO: 0 PER 100 WBC
PH UR STRIP: 6.5 (ref 5–8)
PLATELET # BLD AUTO: 267 K/UL (ref 135–420)
PMV BLD AUTO: 10.8 FL (ref 9.2–11.8)
POTASSIUM SERPL-SCNC: 3.9 MMOL/L (ref 3.5–5.5)
PROT SERPL-MCNC: 7 G/DL (ref 6.4–8.2)
PROT UR STRIP-MCNC: 100 MG/DL
RBC # BLD AUTO: 4.8 M/UL (ref 4.2–5.3)
RBC #/AREA URNS HPF: ABNORMAL /HPF (ref 0–5)
SODIUM SERPL-SCNC: 140 MMOL/L (ref 136–145)
SP GR UR REFRACTOMETRY: 1.02 (ref 1–1.03)
UROBILINOGEN UR QL STRIP.AUTO: 1 EU/DL (ref 0.2–1)
WBC # BLD AUTO: 7.6 K/UL (ref 4.6–13.2)
WBC URNS QL MICRO: ABNORMAL /HPF (ref 0–4)

## 2025-08-10 PROCEDURE — 85025 COMPLETE CBC W/AUTO DIFF WBC: CPT

## 2025-08-10 PROCEDURE — 99284 EMERGENCY DEPT VISIT MOD MDM: CPT

## 2025-08-10 PROCEDURE — 6360000002 HC RX W HCPCS: Performed by: EMERGENCY MEDICINE

## 2025-08-10 PROCEDURE — 96374 THER/PROPH/DIAG INJ IV PUSH: CPT

## 2025-08-10 PROCEDURE — 80053 COMPREHEN METABOLIC PANEL: CPT

## 2025-08-10 PROCEDURE — 6370000000 HC RX 637 (ALT 250 FOR IP): Performed by: EMERGENCY MEDICINE

## 2025-08-10 PROCEDURE — 96375 TX/PRO/DX INJ NEW DRUG ADDON: CPT

## 2025-08-10 PROCEDURE — 2500000003 HC RX 250 WO HCPCS: Performed by: EMERGENCY MEDICINE

## 2025-08-10 PROCEDURE — 82962 GLUCOSE BLOOD TEST: CPT

## 2025-08-10 PROCEDURE — 81001 URINALYSIS AUTO W/SCOPE: CPT

## 2025-08-10 RX ORDER — KETOROLAC TROMETHAMINE 15 MG/ML
15 INJECTION, SOLUTION INTRAMUSCULAR; INTRAVENOUS EVERY 6 HOURS PRN
Status: DISCONTINUED | OUTPATIENT
Start: 2025-08-10 | End: 2025-08-10 | Stop reason: HOSPADM

## 2025-08-10 RX ORDER — INSULIN ASPART 100 [IU]/ML
INJECTION, SOLUTION INTRAVENOUS; SUBCUTANEOUS
Qty: 15 ML | Refills: 0 | Status: SHIPPED | OUTPATIENT
Start: 2025-08-10 | End: 2025-08-10

## 2025-08-10 RX ORDER — INSULIN GLARGINE 100 [IU]/ML
INJECTION, SOLUTION SUBCUTANEOUS
Qty: 5 ADJUSTABLE DOSE PRE-FILLED PEN SYRINGE | Refills: 3 | Status: SHIPPED | OUTPATIENT
Start: 2025-08-10

## 2025-08-10 RX ORDER — KETOROLAC TROMETHAMINE 15 MG/ML
15 INJECTION, SOLUTION INTRAMUSCULAR; INTRAVENOUS
Status: COMPLETED | OUTPATIENT
Start: 2025-08-10 | End: 2025-08-10

## 2025-08-10 RX ORDER — INSULIN ASPART 100 [IU]/ML
INJECTION, SOLUTION INTRAVENOUS; SUBCUTANEOUS
Qty: 15 ML | Refills: 0 | Status: SHIPPED | OUTPATIENT
Start: 2025-08-10

## 2025-08-10 RX ORDER — LABETALOL HYDROCHLORIDE 5 MG/ML
20 INJECTION, SOLUTION INTRAVENOUS
Status: COMPLETED | OUTPATIENT
Start: 2025-08-10 | End: 2025-08-10

## 2025-08-10 RX ORDER — INSULIN GLARGINE 100 [IU]/ML
INJECTION, SOLUTION SUBCUTANEOUS
Qty: 5 ADJUSTABLE DOSE PRE-FILLED PEN SYRINGE | Refills: 3 | Status: SHIPPED | OUTPATIENT
Start: 2025-08-10 | End: 2025-08-10

## 2025-08-10 RX ORDER — CEFUROXIME AXETIL 500 MG/1
500 TABLET ORAL 2 TIMES DAILY
Qty: 20 TABLET | Refills: 0 | Status: SHIPPED | OUTPATIENT
Start: 2025-08-10 | End: 2025-08-20

## 2025-08-10 RX ORDER — CEFUROXIME AXETIL 500 MG/1
500 TABLET ORAL 2 TIMES DAILY
Qty: 20 TABLET | Refills: 0 | Status: SHIPPED | OUTPATIENT
Start: 2025-08-10 | End: 2025-08-10

## 2025-08-10 RX ADMIN — CEFAZOLIN 1000 MG: 1 INJECTION, POWDER, FOR SOLUTION INTRAMUSCULAR; INTRAVENOUS at 05:33

## 2025-08-10 RX ADMIN — LABETALOL HYDROCHLORIDE 20 MG: 5 INJECTION, SOLUTION INTRAVENOUS at 06:09

## 2025-08-10 RX ADMIN — KETOROLAC TROMETHAMINE 15 MG: 15 INJECTION, SOLUTION INTRAMUSCULAR; INTRAVENOUS at 05:33

## 2025-08-10 RX ADMIN — INSULIN HUMAN 6 UNITS: 100 INJECTION, SOLUTION PARENTERAL at 06:13

## 2025-08-10 ASSESSMENT — PAIN DESCRIPTION - PAIN TYPE: TYPE: ACUTE PAIN

## 2025-08-10 ASSESSMENT — LIFESTYLE VARIABLES
HOW OFTEN DO YOU HAVE A DRINK CONTAINING ALCOHOL: NEVER
HOW MANY STANDARD DRINKS CONTAINING ALCOHOL DO YOU HAVE ON A TYPICAL DAY: PATIENT DOES NOT DRINK

## 2025-08-10 ASSESSMENT — PAIN DESCRIPTION - LOCATION
LOCATION: ABDOMEN
LOCATION: ABDOMEN;BACK;VAGINA

## 2025-08-10 ASSESSMENT — PAIN - FUNCTIONAL ASSESSMENT
PAIN_FUNCTIONAL_ASSESSMENT: 0-10
PAIN_FUNCTIONAL_ASSESSMENT: 0-10
PAIN_FUNCTIONAL_ASSESSMENT: ACTIVITIES ARE NOT PREVENTED
PAIN_FUNCTIONAL_ASSESSMENT: ACTIVITIES ARE NOT PREVENTED

## 2025-08-10 ASSESSMENT — PAIN SCALES - GENERAL
PAINLEVEL_OUTOF10: 6
PAINLEVEL_OUTOF10: 10
PAINLEVEL_OUTOF10: 8

## 2025-08-10 ASSESSMENT — PAIN DESCRIPTION - DESCRIPTORS
DESCRIPTORS: ACHING;CRAMPING;DISCOMFORT
DESCRIPTORS: ACHING;BURNING

## 2025-08-10 ASSESSMENT — PAIN DESCRIPTION - FREQUENCY: FREQUENCY: CONTINUOUS

## 2025-08-28 ENCOUNTER — OFFICE VISIT (OUTPATIENT)
Facility: CLINIC | Age: 55
End: 2025-08-28
Payer: COMMERCIAL

## 2025-08-28 DIAGNOSIS — I10 PRIMARY HYPERTENSION: ICD-10-CM

## 2025-08-28 DIAGNOSIS — F41.9 ANXIETY: ICD-10-CM

## 2025-08-28 DIAGNOSIS — N28.9 IMPAIRED RENAL FUNCTION: ICD-10-CM

## 2025-08-28 DIAGNOSIS — Z13.29 SCREENING FOR ENDOCRINE, METABOLIC AND IMMUNITY DISORDER: Primary | ICD-10-CM

## 2025-08-28 DIAGNOSIS — G56.03 BILATERAL CARPAL TUNNEL SYNDROME: ICD-10-CM

## 2025-08-28 DIAGNOSIS — E11.40 TYPE 2 DIABETES MELLITUS WITH DIABETIC NEUROPATHY, WITH LONG-TERM CURRENT USE OF INSULIN (HCC): ICD-10-CM

## 2025-08-28 DIAGNOSIS — G62.9 NEUROPATHY: ICD-10-CM

## 2025-08-28 DIAGNOSIS — E03.9 HYPOTHYROIDISM, UNSPECIFIED TYPE: ICD-10-CM

## 2025-08-28 DIAGNOSIS — K21.9 GASTROESOPHAGEAL REFLUX DISEASE, UNSPECIFIED WHETHER ESOPHAGITIS PRESENT: ICD-10-CM

## 2025-08-28 DIAGNOSIS — Z79.4 TYPE 2 DIABETES MELLITUS WITH DIABETIC NEUROPATHY, WITH LONG-TERM CURRENT USE OF INSULIN (HCC): ICD-10-CM

## 2025-08-28 DIAGNOSIS — J45.909 UNCOMPLICATED ASTHMA, UNSPECIFIED ASTHMA SEVERITY, UNSPECIFIED WHETHER PERSISTENT: ICD-10-CM

## 2025-08-28 DIAGNOSIS — Z13.228 SCREENING FOR ENDOCRINE, METABOLIC AND IMMUNITY DISORDER: Primary | ICD-10-CM

## 2025-08-28 DIAGNOSIS — Z13.0 SCREENING FOR ENDOCRINE, METABOLIC AND IMMUNITY DISORDER: Primary | ICD-10-CM

## 2025-08-28 DIAGNOSIS — E55.9 VITAMIN D DEFICIENCY: ICD-10-CM

## 2025-08-28 DIAGNOSIS — E78.5 HYPERLIPIDEMIA, UNSPECIFIED HYPERLIPIDEMIA TYPE: ICD-10-CM

## 2025-08-28 PROCEDURE — 99215 OFFICE O/P EST HI 40 MIN: CPT | Performed by: INTERNAL MEDICINE

## 2025-08-28 PROCEDURE — 2022F DILAT RTA XM EVC RTNOPTHY: CPT | Performed by: INTERNAL MEDICINE

## 2025-08-28 PROCEDURE — 3075F SYST BP GE 130 - 139MM HG: CPT | Performed by: INTERNAL MEDICINE

## 2025-08-28 PROCEDURE — 1036F TOBACCO NON-USER: CPT | Performed by: INTERNAL MEDICINE

## 2025-08-28 PROCEDURE — 3046F HEMOGLOBIN A1C LEVEL >9.0%: CPT | Performed by: INTERNAL MEDICINE

## 2025-08-28 PROCEDURE — G8417 CALC BMI ABV UP PARAM F/U: HCPCS | Performed by: INTERNAL MEDICINE

## 2025-08-28 PROCEDURE — 3078F DIAST BP <80 MM HG: CPT | Performed by: INTERNAL MEDICINE

## 2025-08-28 PROCEDURE — G8427 DOCREV CUR MEDS BY ELIG CLIN: HCPCS | Performed by: INTERNAL MEDICINE

## 2025-08-28 PROCEDURE — 3017F COLORECTAL CA SCREEN DOC REV: CPT | Performed by: INTERNAL MEDICINE

## 2025-08-28 RX ORDER — DULOXETIN HYDROCHLORIDE 20 MG/1
20 CAPSULE, DELAYED RELEASE ORAL DAILY
Qty: 90 CAPSULE | Refills: 0 | Status: SHIPPED | OUTPATIENT
Start: 2025-08-28

## 2025-08-28 RX ORDER — ATORVASTATIN CALCIUM 80 MG/1
80 TABLET, FILM COATED ORAL DAILY
Qty: 90 TABLET | Refills: 0 | Status: SHIPPED | OUTPATIENT
Start: 2025-08-28

## 2025-08-28 RX ORDER — OMEPRAZOLE 40 MG/1
40 CAPSULE, DELAYED RELEASE ORAL
Qty: 90 CAPSULE | Refills: 1 | Status: SHIPPED | OUTPATIENT
Start: 2025-08-28

## 2025-08-28 RX ORDER — FLASH GLUCOSE SENSOR
1 KIT MISCELLANEOUS
Qty: 2 EACH | Refills: 1 | Status: SHIPPED | OUTPATIENT
Start: 2025-08-28

## 2025-08-28 RX ORDER — AMLODIPINE BESYLATE 2.5 MG/1
2.5 TABLET ORAL DAILY
Qty: 90 TABLET | Refills: 0 | Status: SHIPPED | OUTPATIENT
Start: 2025-08-28

## 2025-08-28 RX ORDER — ONDANSETRON 4 MG/1
4 TABLET, FILM COATED ORAL 3 TIMES DAILY PRN
Qty: 30 TABLET | Refills: 0 | Status: CANCELLED | OUTPATIENT
Start: 2025-08-28

## 2025-08-28 ASSESSMENT — PATIENT HEALTH QUESTIONNAIRE - PHQ9
SUM OF ALL RESPONSES TO PHQ QUESTIONS 1-9: 0
SUM OF ALL RESPONSES TO PHQ QUESTIONS 1-9: 0
2. FEELING DOWN, DEPRESSED OR HOPELESS: NOT AT ALL
SUM OF ALL RESPONSES TO PHQ QUESTIONS 1-9: 0
1. LITTLE INTEREST OR PLEASURE IN DOING THINGS: NOT AT ALL
SUM OF ALL RESPONSES TO PHQ QUESTIONS 1-9: 0

## 2025-08-29 ENCOUNTER — TELEPHONE (OUTPATIENT)
Facility: CLINIC | Age: 55
End: 2025-08-29

## 2025-08-29 ENCOUNTER — HOSPITAL ENCOUNTER (OUTPATIENT)
Facility: HOSPITAL | Age: 55
Setting detail: SPECIMEN
Discharge: HOME | End: 2025-09-01
Payer: COMMERCIAL

## 2025-08-29 VITALS
RESPIRATION RATE: 18 BRPM | WEIGHT: 195 LBS | HEART RATE: 80 BPM | TEMPERATURE: 98 F | DIASTOLIC BLOOD PRESSURE: 80 MMHG | BODY MASS INDEX: 31.34 KG/M2 | HEIGHT: 66 IN | OXYGEN SATURATION: 100 % | SYSTOLIC BLOOD PRESSURE: 130 MMHG

## 2025-08-29 DIAGNOSIS — E55.9 VITAMIN D DEFICIENCY: ICD-10-CM

## 2025-08-29 DIAGNOSIS — E11.40 TYPE 2 DIABETES MELLITUS WITH DIABETIC NEUROPATHY, WITH LONG-TERM CURRENT USE OF INSULIN (HCC): ICD-10-CM

## 2025-08-29 DIAGNOSIS — Z13.29 SCREENING FOR ENDOCRINE, METABOLIC AND IMMUNITY DISORDER: ICD-10-CM

## 2025-08-29 DIAGNOSIS — Z13.228 SCREENING FOR ENDOCRINE, METABOLIC AND IMMUNITY DISORDER: ICD-10-CM

## 2025-08-29 DIAGNOSIS — E03.9 HYPOTHYROIDISM, UNSPECIFIED TYPE: ICD-10-CM

## 2025-08-29 DIAGNOSIS — Z79.4 TYPE 2 DIABETES MELLITUS WITH DIABETIC NEUROPATHY, WITH LONG-TERM CURRENT USE OF INSULIN (HCC): ICD-10-CM

## 2025-08-29 DIAGNOSIS — Z13.0 SCREENING FOR ENDOCRINE, METABOLIC AND IMMUNITY DISORDER: ICD-10-CM

## 2025-08-29 PROBLEM — G56.03 BILATERAL CARPAL TUNNEL SYNDROME: Status: ACTIVE | Noted: 2025-08-29

## 2025-08-29 LAB
25(OH)D3 SERPL-MCNC: 17.7 NG/ML (ref 30–100)
ALBUMIN SERPL-MCNC: 3.3 G/DL (ref 3.4–5)
ALBUMIN/GLOB SERPL: 1 (ref 0.8–1.7)
ALP SERPL-CCNC: 120 U/L (ref 45–117)
ALT SERPL-CCNC: 35 U/L (ref 10–35)
ANION GAP SERPL CALC-SCNC: 9 MMOL/L (ref 3–18)
AST SERPL-CCNC: 60 U/L (ref 10–38)
BASOPHILS # BLD: 0.03 K/UL (ref 0–0.1)
BASOPHILS NFR BLD: 0.4 % (ref 0–2)
BILIRUB SERPL-MCNC: 0.4 MG/DL (ref 0.2–1)
BUN SERPL-MCNC: 20 MG/DL (ref 6–23)
BUN/CREAT SERPL: 20 (ref 12–20)
CALCIUM SERPL-MCNC: 9.5 MG/DL (ref 8.5–10.1)
CHLORIDE SERPL-SCNC: 107 MMOL/L (ref 98–107)
CHOLEST SERPL-MCNC: 179 MG/DL
CO2 SERPL-SCNC: 26 MMOL/L (ref 21–32)
CREAT SERPL-MCNC: 0.98 MG/DL (ref 0.6–1.3)
CREAT UR-MCNC: 128 MG/DL (ref 30–125)
DIFFERENTIAL METHOD BLD: ABNORMAL
EOSINOPHIL # BLD: 0.24 K/UL (ref 0–0.4)
EOSINOPHIL NFR BLD: 3.6 % (ref 0–5)
ERYTHROCYTE [DISTWIDTH] IN BLOOD BY AUTOMATED COUNT: 13.4 % (ref 11.6–14.5)
EST. AVERAGE GLUCOSE BLD GHB EST-MCNC: 240 MG/DL
GLOBULIN SER CALC-MCNC: 3.3 G/DL (ref 2–4)
GLUCOSE SERPL-MCNC: 233 MG/DL (ref 74–108)
HBA1C MFR BLD: 10 % (ref 4.2–5.6)
HCT VFR BLD AUTO: 41.6 % (ref 35–45)
HDLC SERPL-MCNC: 44 MG/DL (ref 40–60)
HDLC SERPL: 4 (ref 0–5)
HGB BLD-MCNC: 12.6 G/DL (ref 12–16)
IMM GRANULOCYTES # BLD AUTO: 0.01 K/UL (ref 0–0.04)
IMM GRANULOCYTES NFR BLD AUTO: 0.1 % (ref 0–0.5)
LDLC SERPL CALC-MCNC: 97 MG/DL (ref 0–100)
LYMPHOCYTES # BLD: 2.38 K/UL (ref 0.9–3.6)
LYMPHOCYTES NFR BLD: 35.5 % (ref 21–52)
MCH RBC QN AUTO: 25.8 PG (ref 24–34)
MCHC RBC AUTO-ENTMCNC: 30.3 G/DL (ref 31–37)
MCV RBC AUTO: 85.1 FL (ref 78–100)
MICROALBUMIN UR-MCNC: 14.7 MG/DL (ref 0–3)
MICROALBUMIN/CREAT UR-RTO: 115 MG/G (ref 0–30)
MONOCYTES # BLD: 0.54 K/UL (ref 0.05–1.2)
MONOCYTES NFR BLD: 8.1 % (ref 3–10)
NEUTS SEG # BLD: 3.5 K/UL (ref 1.8–8)
NEUTS SEG NFR BLD: 52.3 % (ref 40–73)
NRBC # BLD: 0 K/UL (ref 0–0.01)
NRBC BLD-RTO: 0 PER 100 WBC
PLATELET # BLD AUTO: 265 K/UL (ref 135–420)
PMV BLD AUTO: 11.8 FL (ref 9.2–11.8)
POTASSIUM SERPL-SCNC: 4.2 MMOL/L (ref 3.5–5.5)
PROT SERPL-MCNC: 6.7 G/DL (ref 6.4–8.2)
RBC # BLD AUTO: 4.89 M/UL (ref 4.2–5.3)
SODIUM SERPL-SCNC: 142 MMOL/L (ref 136–145)
T4 FREE SERPL-MCNC: 1.1 NG/DL (ref 0.9–1.7)
TRIGL SERPL-MCNC: 191 MG/DL (ref 0–150)
TSH, 3RD GENERATION: 2.42 UIU/ML (ref 0.27–4.2)
VLDLC SERPL CALC-MCNC: 38 MG/DL
WBC # BLD AUTO: 6.7 K/UL (ref 4.6–13.2)

## 2025-08-29 PROCEDURE — 82306 VITAMIN D 25 HYDROXY: CPT

## 2025-08-29 PROCEDURE — 80061 LIPID PANEL: CPT

## 2025-08-29 PROCEDURE — 82043 UR ALBUMIN QUANTITATIVE: CPT

## 2025-08-29 PROCEDURE — 84439 ASSAY OF FREE THYROXINE: CPT

## 2025-08-29 PROCEDURE — 84443 ASSAY THYROID STIM HORMONE: CPT

## 2025-08-29 PROCEDURE — 82570 ASSAY OF URINE CREATININE: CPT

## 2025-08-29 PROCEDURE — 80053 COMPREHEN METABOLIC PANEL: CPT

## 2025-08-29 PROCEDURE — 36415 COLL VENOUS BLD VENIPUNCTURE: CPT

## 2025-08-29 PROCEDURE — 85025 COMPLETE CBC W/AUTO DIFF WBC: CPT

## 2025-08-29 PROCEDURE — 83036 HEMOGLOBIN GLYCOSYLATED A1C: CPT

## 2025-09-01 ENCOUNTER — RESULTS FOLLOW-UP (OUTPATIENT)
Facility: CLINIC | Age: 55
End: 2025-09-01

## 2025-09-02 ENCOUNTER — TELEMEDICINE (OUTPATIENT)
Facility: CLINIC | Age: 55
End: 2025-09-02
Payer: COMMERCIAL

## 2025-09-02 DIAGNOSIS — G62.9 NEUROPATHY: ICD-10-CM

## 2025-09-02 DIAGNOSIS — E11.65 TYPE 2 DIABETES MELLITUS WITH HYPERGLYCEMIA, UNSPECIFIED WHETHER LONG TERM INSULIN USE (HCC): ICD-10-CM

## 2025-09-02 DIAGNOSIS — I10 PRIMARY HYPERTENSION: Primary | ICD-10-CM

## 2025-09-02 DIAGNOSIS — E78.5 HYPERLIPIDEMIA, UNSPECIFIED HYPERLIPIDEMIA TYPE: ICD-10-CM

## 2025-09-02 DIAGNOSIS — E03.9 HYPOTHYROIDISM, UNSPECIFIED TYPE: ICD-10-CM

## 2025-09-02 DIAGNOSIS — N28.9 IMPAIRED RENAL FUNCTION: ICD-10-CM

## 2025-09-02 PROCEDURE — G8427 DOCREV CUR MEDS BY ELIG CLIN: HCPCS | Performed by: INTERNAL MEDICINE

## 2025-09-02 PROCEDURE — 2022F DILAT RTA XM EVC RTNOPTHY: CPT | Performed by: INTERNAL MEDICINE

## 2025-09-02 PROCEDURE — 3046F HEMOGLOBIN A1C LEVEL >9.0%: CPT | Performed by: INTERNAL MEDICINE

## 2025-09-02 PROCEDURE — 99213 OFFICE O/P EST LOW 20 MIN: CPT | Performed by: INTERNAL MEDICINE

## 2025-09-02 PROCEDURE — 3017F COLORECTAL CA SCREEN DOC REV: CPT | Performed by: INTERNAL MEDICINE

## (undated) DEVICE — FLUFF AND POLYMER UNDERPAD,EXTRA HEAVY: Brand: WINGS

## (undated) DEVICE — SOLUTION IRRIG 1000ML H2O STRL BLT

## (undated) DEVICE — CANNULA NSL AD TBNG L14FT STD PVC O2 CRV CONN NONFLARED NSL

## (undated) DEVICE — YANKAUER,SMOOTH HANDLE,HIGH CAPACITY: Brand: MEDLINE INDUSTRIES, INC.

## (undated) DEVICE — CATHETER SUCT TR FL TIP 14FR W/ O CTRL

## (undated) DEVICE — LINER SUCT CANSTR 3000CC PLAS SFT PRE ASSEMB W/OUT TBNG W/

## (undated) DEVICE — BITE BLOCK ENDOSCP UNIV AD 6 TO 9.4 MM

## (undated) DEVICE — MEDI-VAC NON-CONDUCTIVE SUCTION TUBING: Brand: CARDINAL HEALTH

## (undated) DEVICE — SYRINGE MED 25GA 3ML L5/8IN SUBQ PLAS W/ DETACH NDL SFTY

## (undated) DEVICE — FCPS RAD JAW 4LC 240CM W/NDL -- BX/20 RADIAL JAW 4

## (undated) DEVICE — BASIN EMSIS 16OZ GRAPHITE PLAS KID SHP MOLD GRAD FOR ORAL

## (undated) DEVICE — GAUZE,SPONGE,4"X4",16PLY,STRL,LF,10/TRAY: Brand: MEDLINE

## (undated) DEVICE — STERILE POLYISOPRENE POWDER-FREE SURGICAL GLOVES: Brand: PROTEXIS

## (undated) DEVICE — SYR 50ML SLIP TIP NSAF LF STRL --

## (undated) DEVICE — ENDOSCOPY PUMP TUBING/ CAP SET: Brand: ERBE